# Patient Record
Sex: MALE | Race: WHITE | Employment: FULL TIME | URBAN - METROPOLITAN AREA
[De-identification: names, ages, dates, MRNs, and addresses within clinical notes are randomized per-mention and may not be internally consistent; named-entity substitution may affect disease eponyms.]

---

## 2017-01-20 ENCOUNTER — OFFICE VISIT (OUTPATIENT)
Dept: INTERNAL MEDICINE CLINIC | Facility: CLINIC | Age: 37
End: 2017-01-20

## 2017-01-20 VITALS
WEIGHT: 262 LBS | BODY MASS INDEX: 36.68 KG/M2 | HEIGHT: 71 IN | SYSTOLIC BLOOD PRESSURE: 125 MMHG | TEMPERATURE: 98 F | DIASTOLIC BLOOD PRESSURE: 88 MMHG | HEART RATE: 125 BPM

## 2017-01-20 DIAGNOSIS — J02.0 PHARYNGITIS DUE TO STREPTOCOCCUS SPECIES: Primary | ICD-10-CM

## 2017-01-20 LAB
CONTROL LINE PRESENT WITH A CLEAR BACKGROUND (YES/NO): YES YES/NO
KIT LOT #: ABNORMAL NUMERIC
STREP GRP A CUL-SCR: POSITIVE

## 2017-01-20 PROCEDURE — 87880 STREP A ASSAY W/OPTIC: CPT | Performed by: INTERNAL MEDICINE

## 2017-01-20 PROCEDURE — 99213 OFFICE O/P EST LOW 20 MIN: CPT | Performed by: INTERNAL MEDICINE

## 2017-01-20 RX ORDER — PENICILLIN V POTASSIUM 500 MG/1
500 TABLET ORAL 4 TIMES DAILY
Qty: 40 TABLET | Refills: 0 | Status: SHIPPED | OUTPATIENT
Start: 2017-01-20 | End: 2017-04-19

## 2017-01-20 NOTE — PROGRESS NOTES
Sore throat ear pain  Family members  With strep  Blood pressure 125/88, pulse 125, temperature 98.3 °F (36.8 °C), temperature source Oral, height 5' 11\" (1.803 m), weight 262 lb (118.842 kg).     HEENT-NC/AT PEERLA, eom intact, sclerae non icteric, oral e

## 2017-02-09 ENCOUNTER — TELEPHONE (OUTPATIENT)
Dept: INTERNAL MEDICINE CLINIC | Facility: CLINIC | Age: 37
End: 2017-02-09

## 2017-02-09 NOTE — TELEPHONE ENCOUNTER
Pt states for past  3-4 days having throat pain \"scratch in throat is persistent\", states had strep a few weeks ago. Requesting if possible to prescribe antibiotic to help clear out, Please Advise.

## 2017-02-09 NOTE — TELEPHONE ENCOUNTER
Pt asking if he can come to be swab for Strep at Lab,advised done at Office      appt made for Nurse visit  For tomorrow

## 2017-02-09 NOTE — TELEPHONE ENCOUNTER
Reason for Call/Chief Complaint: sore throat  Onset: 4 days  Nursing Assessment/Associated Symptoms: sore/scratchy throat, had strep recently, finished abx; denies fever, congestions, white spots on back.  __________________________________________________

## 2017-02-10 NOTE — TELEPHONE ENCOUNTER
Dr Deanna Santos I was misinformed about scheduling Pt with Strep test as a Nurse Visit at [address] [place] [address] Drive advised it has to be an OV since Dr's do the Test,tried to offer the 11:10 Appt    Pt was beyond upset and stated he just don't understand this Process and

## 2017-04-13 ENCOUNTER — TELEPHONE (OUTPATIENT)
Dept: INTERNAL MEDICINE CLINIC | Facility: CLINIC | Age: 37
End: 2017-04-13

## 2017-04-13 RX ORDER — TRIAMTERENE AND HYDROCHLOROTHIAZIDE 37.5; 25 MG/1; MG/1
1 CAPSULE ORAL EVERY MORNING
Qty: 30 CAPSULE | Refills: 3 | Status: SHIPPED | OUTPATIENT
Start: 2017-04-13 | End: 2017-05-15 | Stop reason: ALTCHOICE

## 2017-04-13 NOTE — TELEPHONE ENCOUNTER
Hypertensive Medications  Protocol Criteria:  · Appointment scheduled in the past 6 months or in the next 3 months  · BMP or CMP in the past 12 months  · Creatinine result < 2  Recent Visits       Provider Department Primary Dx    2 months ago Sylvester Bee

## 2017-04-15 RX ORDER — AMLODIPINE BESYLATE 5 MG/1
5 TABLET ORAL DAILY
Qty: 30 TABLET | Refills: 2 | Status: SHIPPED | OUTPATIENT
Start: 2017-04-15 | End: 2017-07-14

## 2017-04-19 ENCOUNTER — APPOINTMENT (OUTPATIENT)
Dept: LAB | Age: 37
End: 2017-04-19
Attending: FAMILY MEDICINE
Payer: COMMERCIAL

## 2017-04-19 ENCOUNTER — OFFICE VISIT (OUTPATIENT)
Dept: FAMILY MEDICINE CLINIC | Facility: CLINIC | Age: 37
End: 2017-04-19

## 2017-04-19 VITALS
TEMPERATURE: 98 F | WEIGHT: 255 LBS | SYSTOLIC BLOOD PRESSURE: 138 MMHG | RESPIRATION RATE: 18 BRPM | DIASTOLIC BLOOD PRESSURE: 93 MMHG | BODY MASS INDEX: 36 KG/M2 | HEART RATE: 109 BPM

## 2017-04-19 DIAGNOSIS — S30.861A TICK BITE OF ABDOMEN, INITIAL ENCOUNTER: ICD-10-CM

## 2017-04-19 DIAGNOSIS — R11.2 NON-INTRACTABLE VOMITING WITH NAUSEA, UNSPECIFIED VOMITING TYPE: Primary | ICD-10-CM
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
## 2017-04-19 PROCEDURE — 99212 OFFICE O/P EST SF 10 MIN: CPT | Performed by: FAMILY MEDICINE

## 2017-04-19 PROCEDURE — 99213 OFFICE O/P EST LOW 20 MIN: CPT | Performed by: FAMILY MEDICINE

## 2017-04-19 PROCEDURE — 86618 LYME DISEASE ANTIBODY: CPT

## 2017-04-19 PROCEDURE — 36415 COLL VENOUS BLD VENIPUNCTURE: CPT

## 2017-04-19 RX ORDER — AMLODIPINE BESYLATE 5 MG/1
5 TABLET ORAL DAILY
Qty: 30 TABLET | Refills: 3 | Status: CANCELLED | OUTPATIENT
Start: 2017-04-19

## 2017-04-19 NOTE — PROGRESS NOTES
HPI: Mak Chacon is a 43year old male who presents for low grade fever, cough, chills, and vomiting. Vomited several times overnight. Pt was playing golf on Sunday and found a tick on his abdomen. Removed alive and whole. Did not keep tick or take picture. etiology. Symptomatic management. Tick bite of abdomen, initial encounter    No rash or neurological symptoms noted. Will do Lyme titers today. Advised to call if he experiences neurological symptoms.     Chago Blanton, DO
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
## 2017-04-21 DIAGNOSIS — M79.10 MYALGIA: ICD-10-CM

## 2017-05-15 ENCOUNTER — OFFICE VISIT (OUTPATIENT)
Dept: INTERNAL MEDICINE CLINIC | Facility: CLINIC | Age: 37
End: 2017-05-15

## 2017-05-15 VITALS
SYSTOLIC BLOOD PRESSURE: 156 MMHG | HEART RATE: 112 BPM | BODY MASS INDEX: 35.56 KG/M2 | DIASTOLIC BLOOD PRESSURE: 92 MMHG | WEIGHT: 254 LBS | HEIGHT: 71 IN

## 2017-05-15 DIAGNOSIS — I10 ESSENTIAL HYPERTENSION: Primary | ICD-10-CM

## 2017-05-15 PROCEDURE — 99212 OFFICE O/P EST SF 10 MIN: CPT | Performed by: INTERNAL MEDICINE

## 2017-05-15 PROCEDURE — 99213 OFFICE O/P EST LOW 20 MIN: CPT | Performed by: INTERNAL MEDICINE

## 2017-05-15 RX ORDER — LISINOPRIL AND HYDROCHLOROTHIAZIDE 12.5; 1 MG/1; MG/1
1 TABLET ORAL DAILY
Qty: 30 TABLET | Refills: 3 | Status: SHIPPED | OUTPATIENT
Start: 2017-05-15 | End: 2017-09-06

## 2017-05-15 NOTE — PROGRESS NOTES
Prabha Aggarwal is a 43year old male. Patient presents with:  Hypertension    HPI:   Mr. Ted Watters presents this afternoon with concerns about elevated blood pressure. At his last visit with me in December, Dyazide was added to amlodipine.   Since then, Comment: Occasional beer         EXAM:   GENERAL: Pleasant male appearing well in no distress  /92 mmHg  Pulse 112  Ht 5' 11\" (1.803 m)  Wt 254 lb (115.214 kg)  BMI 35.44 kg/m2  LUNGS: Resonant to percussion and clear to auscultation  CARDIAC: Rhyth

## 2017-05-15 NOTE — PATIENT INSTRUCTIONS
Please stop Dyazide (triamterene/hydrochlorothiazide). Continue amlodipine. Begin lisinopril/HCTZ 10/12.5 mg 1 pill daily. Return visit in one month for a blood pressure check.

## 2017-07-11 RX ORDER — AMLODIPINE BESYLATE 5 MG/1
5 TABLET ORAL DAILY
Qty: 30 TABLET | Refills: 2 | Status: CANCELLED
Start: 2017-07-11

## 2017-07-13 RX ORDER — AMLODIPINE BESYLATE 5 MG/1
5 TABLET ORAL DAILY
Qty: 30 TABLET | Refills: 2 | Status: CANCELLED
Start: 2017-07-13

## 2017-07-14 ENCOUNTER — TELEPHONE (OUTPATIENT)
Dept: INTERNAL MEDICINE CLINIC | Facility: CLINIC | Age: 37
End: 2017-07-14

## 2017-07-14 RX ORDER — AMLODIPINE BESYLATE 5 MG/1
TABLET ORAL
Qty: 30 TABLET | Refills: 0 | Status: SHIPPED | OUTPATIENT
Start: 2017-07-14 | End: 2017-08-09

## 2017-07-15 NOTE — TELEPHONE ENCOUNTER
Hypertensive Medications  Protocol Criteria:  · Appointment scheduled in the past 6 months or in the next 3 months  · BMP or CMP in the past 12 months  · Creatinine result < 2  Recent Outpatient Visits            2 months ago Essential hypertension    [facility]

## 2017-07-17 NOTE — TELEPHONE ENCOUNTER
Pt contacted and states he would love to make an appt but it costs him $100 every time he comes here. Pt states he will do his best to make an appt in the near future.

## 2017-08-09 ENCOUNTER — TELEPHONE (OUTPATIENT)
Dept: INTERNAL MEDICINE CLINIC | Facility: CLINIC | Age: 37
End: 2017-08-09

## 2017-08-09 RX ORDER — AMLODIPINE BESYLATE 5 MG/1
TABLET ORAL
Qty: 30 TABLET | Refills: 0 | Status: SHIPPED | OUTPATIENT
Start: 2017-08-09 | End: 2017-09-06

## 2017-09-06 RX ORDER — AMLODIPINE BESYLATE 5 MG/1
TABLET ORAL
Qty: 30 TABLET | Refills: 0 | Status: SHIPPED | OUTPATIENT
Start: 2017-09-06 | End: 2017-09-11

## 2017-09-06 RX ORDER — LISINOPRIL AND HYDROCHLOROTHIAZIDE 12.5; 1 MG/1; MG/1
TABLET ORAL
Qty: 30 TABLET | Refills: 0 | Status: SHIPPED | OUTPATIENT
Start: 2017-09-06 | End: 2017-09-11

## 2017-09-11 ENCOUNTER — OFFICE VISIT (OUTPATIENT)
Dept: INTERNAL MEDICINE CLINIC | Facility: CLINIC | Age: 37
End: 2017-09-11

## 2017-09-11 VITALS
SYSTOLIC BLOOD PRESSURE: 128 MMHG | DIASTOLIC BLOOD PRESSURE: 72 MMHG | WEIGHT: 258 LBS | HEIGHT: 71 IN | HEART RATE: 111 BPM | BODY MASS INDEX: 36.12 KG/M2

## 2017-09-11 DIAGNOSIS — I10 ESSENTIAL HYPERTENSION: Primary | ICD-10-CM

## 2017-09-11 DIAGNOSIS — E78.5 DYSLIPIDEMIA: ICD-10-CM

## 2017-09-11 PROCEDURE — 99212 OFFICE O/P EST SF 10 MIN: CPT | Performed by: INTERNAL MEDICINE

## 2017-09-11 PROCEDURE — 99214 OFFICE O/P EST MOD 30 MIN: CPT | Performed by: INTERNAL MEDICINE

## 2017-09-11 RX ORDER — LISINOPRIL AND HYDROCHLOROTHIAZIDE 12.5; 1 MG/1; MG/1
1 TABLET ORAL
Qty: 90 TABLET | Refills: 1 | Status: SHIPPED | OUTPATIENT
Start: 2017-09-11 | End: 2018-03-06

## 2017-09-11 RX ORDER — AMLODIPINE BESYLATE 5 MG/1
5 TABLET ORAL
Qty: 90 TABLET | Refills: 1 | Status: SHIPPED | OUTPATIENT
Start: 2017-09-11 | End: 2018-05-31

## 2017-09-11 NOTE — PATIENT INSTRUCTIONS
Your blood pressure today was very well controlled. Please continue your current medications. Please obtain blood tests soon when you can. Please begin to exercise regularly. Return visit in 6 months.

## 2017-09-11 NOTE — H&P
Rafiq Navarro is a 43year old male who presents this afternoon for follow-up of hypertension and dyslipidemia. He is also due for his annual exam and labs. HPI:   At his last visit in May, lisinopril/HCTZ was substituted for Dyazide.   He feels well, Former Smoker                                                              Packs/day: 1.00      Years: 6.00         Types: Cigarettes     Quit date: 3/20/2005  Smokeless tobacco: Former User                     Alcohol use:  Yes              Comment: Claudene Libman

## 2017-10-13 ENCOUNTER — PATIENT MESSAGE (OUTPATIENT)
Dept: INTERNAL MEDICINE CLINIC | Facility: CLINIC | Age: 37
End: 2017-10-13

## 2018-01-11 ENCOUNTER — TELEPHONE (OUTPATIENT)
Dept: INTERNAL MEDICINE CLINIC | Facility: CLINIC | Age: 38
End: 2018-01-11

## 2018-01-11 NOTE — TELEPHONE ENCOUNTER
[place] called and informed with understanding. They stated they will make sure and inform the patient.

## 2018-01-11 NOTE — TELEPHONE ENCOUNTER
AmLODIPine Besylate 5 MG Oral Tab is in back order and would like to know can issue 10mg with half daily? Pls advise.

## 2018-03-06 RX ORDER — LISINOPRIL AND HYDROCHLOROTHIAZIDE 12.5; 1 MG/1; MG/1
TABLET ORAL
Qty: 25 TABLET | Refills: 0 | Status: SHIPPED | OUTPATIENT
Start: 2018-03-06 | End: 2018-03-29

## 2018-03-29 RX ORDER — LISINOPRIL AND HYDROCHLOROTHIAZIDE 12.5; 1 MG/1; MG/1
TABLET ORAL
Qty: 25 TABLET | Refills: 0 | Status: SHIPPED | OUTPATIENT
Start: 2018-03-29 | End: 2018-04-27

## 2018-04-27 RX ORDER — LISINOPRIL AND HYDROCHLOROTHIAZIDE 12.5; 1 MG/1; MG/1
TABLET ORAL
Qty: 25 TABLET | Refills: 0 | Status: SHIPPED | OUTPATIENT
Start: 2018-04-27 | End: 2018-05-31

## 2018-05-31 ENCOUNTER — OFFICE VISIT (OUTPATIENT)
Dept: FAMILY MEDICINE CLINIC | Facility: CLINIC | Age: 38
End: 2018-05-31

## 2018-05-31 VITALS
RESPIRATION RATE: 16 BRPM | WEIGHT: 269 LBS | OXYGEN SATURATION: 97 % | HEART RATE: 95 BPM | BODY MASS INDEX: 38 KG/M2 | DIASTOLIC BLOOD PRESSURE: 93 MMHG | SYSTOLIC BLOOD PRESSURE: 150 MMHG | TEMPERATURE: 99 F

## 2018-05-31 DIAGNOSIS — I10 ESSENTIAL HYPERTENSION: ICD-10-CM

## 2018-05-31 DIAGNOSIS — R05.9 COUGH: Primary | ICD-10-CM

## 2018-05-31 PROCEDURE — 99214 OFFICE O/P EST MOD 30 MIN: CPT | Performed by: FAMILY MEDICINE

## 2018-05-31 PROCEDURE — 99212 OFFICE O/P EST SF 10 MIN: CPT | Performed by: FAMILY MEDICINE

## 2018-05-31 RX ORDER — LISINOPRIL AND HYDROCHLOROTHIAZIDE 12.5; 1 MG/1; MG/1
1 TABLET ORAL
Qty: 90 TABLET | Refills: 1 | Status: SHIPPED | OUTPATIENT
Start: 2018-05-31 | End: 2018-12-19

## 2018-05-31 RX ORDER — AMLODIPINE BESYLATE 5 MG/1
5 TABLET ORAL
Qty: 90 TABLET | Refills: 1 | Status: SHIPPED | OUTPATIENT
Start: 2018-05-31 | End: 2018-09-19 | Stop reason: DRUGHIGH

## 2018-05-31 NOTE — PROGRESS NOTES
HPI: Christina Herr is a 42year old male who presents for follow-up. Pt is returning back to office after being seen by Dr. Leodan Crooks. Checks BP at home about once per month. Staes it is in the 140s/80-90s. Taking Lisinopril-HCTZ and Amlodipine.   Has not taking Lis aeration               No wheezes, rales or rhonchi      Assessment:/Plan:  Cough     Lungs clear to auscultation. Advised he continue to use Albuterol as needed. Call if no improvement. Essential hypertension    Meds refilled.  Encouraged pt to make ap

## 2018-06-20 ENCOUNTER — APPOINTMENT (OUTPATIENT)
Dept: LAB | Age: 38
End: 2018-06-20
Attending: FAMILY MEDICINE
Payer: COMMERCIAL

## 2018-06-20 ENCOUNTER — OFFICE VISIT (OUTPATIENT)
Dept: FAMILY MEDICINE CLINIC | Facility: CLINIC | Age: 38
End: 2018-06-20

## 2018-06-20 VITALS
BODY MASS INDEX: 36.84 KG/M2 | SYSTOLIC BLOOD PRESSURE: 153 MMHG | RESPIRATION RATE: 16 BRPM | HEIGHT: 71.5 IN | HEART RATE: 103 BPM | DIASTOLIC BLOOD PRESSURE: 89 MMHG | TEMPERATURE: 98 F | WEIGHT: 269 LBS

## 2018-06-20 DIAGNOSIS — I10 ESSENTIAL HYPERTENSION: ICD-10-CM

## 2018-06-20 DIAGNOSIS — M79.672 LEFT FOOT PAIN: ICD-10-CM

## 2018-06-20 DIAGNOSIS — Z00.00 ROUTINE PHYSICAL EXAMINATION: ICD-10-CM

## 2018-06-20 DIAGNOSIS — Z30.09 VASECTOMY EVALUATION: ICD-10-CM

## 2018-06-20 DIAGNOSIS — Z00.00 ROUTINE PHYSICAL EXAMINATION: Primary | ICD-10-CM

## 2018-06-20 PROCEDURE — 80061 LIPID PANEL: CPT

## 2018-06-20 PROCEDURE — 82306 VITAMIN D 25 HYDROXY: CPT

## 2018-06-20 PROCEDURE — 80053 COMPREHEN METABOLIC PANEL: CPT

## 2018-06-20 PROCEDURE — 84443 ASSAY THYROID STIM HORMONE: CPT

## 2018-06-20 PROCEDURE — 36415 COLL VENOUS BLD VENIPUNCTURE: CPT

## 2018-06-20 PROCEDURE — 85027 COMPLETE CBC AUTOMATED: CPT

## 2018-06-20 PROCEDURE — 81003 URINALYSIS AUTO W/O SCOPE: CPT

## 2018-06-20 PROCEDURE — 99395 PREV VISIT EST AGE 18-39: CPT | Performed by: FAMILY MEDICINE

## 2018-06-20 NOTE — PROGRESS NOTES
HPI:  42 yr old male who presents for physical. Taking medications. BP higher today. Taking both medications. Quit smoking 10 years ago. States he tries to eat healthier and decrease eating out. Eats at home frequently. Avoids fried foods.  Limited red m landmarks noted. Nares patent. Oral mucous membrane moist.  Normal lips, teeth, and gums. Oropharynx normal.  Neck supple. Good ROM. No LAD.   Thyroid normal.  CV:  Regular rate and rhythm; no murmurs  Lungs:  Clear to ausculation; good aeration

## 2018-06-27 DIAGNOSIS — E55.9 VITAMIN D DEFICIENCY: Primary | ICD-10-CM

## 2018-06-27 RX ORDER — ERGOCALCIFEROL 1.25 MG/1
50000 CAPSULE ORAL WEEKLY
Qty: 8 CAPSULE | Refills: 0 | Status: SHIPPED | OUTPATIENT
Start: 2018-06-27 | End: 2018-07-27

## 2018-07-16 RX ORDER — MOMETASONE FUROATE 1 MG/G
1 CREAM TOPICAL 2 TIMES DAILY PRN
Qty: 50 G | Refills: 1 | Status: SHIPPED
Start: 2018-07-16 | End: 2020-01-16

## 2018-07-16 NOTE — TELEPHONE ENCOUNTER
Pt is calling for status of his medication refill request. Per pt he is going out of town tomorrow and would like to  the script today. Pt can be reached at [phone].

## 2018-07-16 NOTE — TELEPHONE ENCOUNTER
please advise as does not meet RN protocol for refill criteria  LR 10/4/16      Refill Protocol Appointment Criteria  · Appointment scheduled in the past 6 months or in the next 3 months  Recent Outpatient Visits            3 weeks ago Routine physical ex

## 2018-09-19 ENCOUNTER — TELEPHONE (OUTPATIENT)
Dept: FAMILY MEDICINE CLINIC | Facility: CLINIC | Age: 38
End: 2018-09-19

## 2018-09-19 RX ORDER — AMLODIPINE BESYLATE 10 MG/1
10 TABLET ORAL DAILY
Qty: 30 TABLET | Refills: 1 | Status: SHIPPED | OUTPATIENT
Start: 2018-09-19 | End: 2018-11-13

## 2018-09-19 NOTE — TELEPHONE ENCOUNTER
Advised patient of Dr. Madeleine West note. Patient verbalized understanding  Patient encouraged to call office back if he continues to get high blood pressure readings after starting new medication.

## 2018-09-19 NOTE — TELEPHONE ENCOUNTER
Patient was advised to call in his BP readings.    This week his BP has been pretty high    9/17/2018 bp was 175/87  9/18/2018 No bp taken   9/19/2018 bp was 186/90

## 2018-10-11 ENCOUNTER — IMMUNIZATION (OUTPATIENT)
Dept: PEDIATRICS CLINIC | Facility: CLINIC | Age: 38
End: 2018-10-11

## 2018-10-11 DIAGNOSIS — Z23 NEED FOR VACCINATION: ICD-10-CM

## 2018-10-11 PROCEDURE — 90686 IIV4 VACC NO PRSV 0.5 ML IM: CPT | Performed by: PEDIATRICS

## 2018-10-11 PROCEDURE — 90471 IMMUNIZATION ADMIN: CPT | Performed by: PEDIATRICS

## 2018-11-13 RX ORDER — AMLODIPINE BESYLATE 10 MG/1
TABLET ORAL
Qty: 30 TABLET | Refills: 2 | Status: SHIPPED | OUTPATIENT
Start: 2018-11-13 | End: 2019-04-08

## 2018-12-21 NOTE — TELEPHONE ENCOUNTER
Please review; protocol failed.     Hypertensive Medications  Protocol Criteria:  · Appointment scheduled in the past 6 months or in the next 3 months  · BMP or CMP in the past 12 months  · Creatinine result < 2  Recent Outpatient Visits            6 months

## 2018-12-22 RX ORDER — LISINOPRIL AND HYDROCHLOROTHIAZIDE 12.5; 1 MG/1; MG/1
1 TABLET ORAL
Qty: 90 TABLET | Refills: 0 | Status: SHIPPED | OUTPATIENT
Start: 2018-12-22 | End: 2019-04-08

## 2019-04-08 ENCOUNTER — OFFICE VISIT (OUTPATIENT)
Dept: FAMILY MEDICINE CLINIC | Facility: CLINIC | Age: 39
End: 2019-04-08
Payer: COMMERCIAL

## 2019-04-08 VITALS
SYSTOLIC BLOOD PRESSURE: 141 MMHG | DIASTOLIC BLOOD PRESSURE: 89 MMHG | RESPIRATION RATE: 16 BRPM | HEART RATE: 108 BPM | TEMPERATURE: 97 F | BODY MASS INDEX: 37 KG/M2 | WEIGHT: 271 LBS

## 2019-04-08 DIAGNOSIS — I10 ESSENTIAL HYPERTENSION: Primary | ICD-10-CM

## 2019-04-08 PROCEDURE — 99214 OFFICE O/P EST MOD 30 MIN: CPT | Performed by: FAMILY MEDICINE

## 2019-04-08 PROCEDURE — 99212 OFFICE O/P EST SF 10 MIN: CPT | Performed by: FAMILY MEDICINE

## 2019-04-08 RX ORDER — AMLODIPINE BESYLATE 10 MG/1
10 TABLET ORAL
Qty: 90 TABLET | Refills: 1 | Status: SHIPPED | OUTPATIENT
Start: 2019-04-08 | End: 2019-09-30

## 2019-04-08 RX ORDER — LISINOPRIL AND HYDROCHLOROTHIAZIDE 12.5; 1 MG/1; MG/1
1 TABLET ORAL
Qty: 90 TABLET | Refills: 1 | Status: SHIPPED | OUTPATIENT
Start: 2019-04-08 | End: 2019-09-30

## 2019-04-08 NOTE — PROGRESS NOTES
HPI: Donna Chang is a 34year old male who presents for HTN follow-up. States he feels well. He is frustrated with weight. He has not had time to get to gym due to work travel and children. Decreased carbs for the past 3-4 weeks. Lost a few pounds.   Denies ch

## 2019-05-18 ENCOUNTER — OFFICE VISIT (OUTPATIENT)
Dept: FAMILY MEDICINE CLINIC | Facility: CLINIC | Age: 39
End: 2019-05-18
Payer: COMMERCIAL

## 2019-05-18 VITALS
OXYGEN SATURATION: 98 % | HEART RATE: 80 BPM | SYSTOLIC BLOOD PRESSURE: 126 MMHG | BODY MASS INDEX: 32.62 KG/M2 | WEIGHT: 233 LBS | HEIGHT: 71 IN | RESPIRATION RATE: 16 BRPM | DIASTOLIC BLOOD PRESSURE: 80 MMHG | TEMPERATURE: 98 F

## 2019-05-18 DIAGNOSIS — J02.9 PHARYNGITIS, UNSPECIFIED ETIOLOGY: Primary | ICD-10-CM

## 2019-05-18 DIAGNOSIS — H69.82 DYSFUNCTION OF LEFT EUSTACHIAN TUBE: ICD-10-CM

## 2019-05-18 PROCEDURE — 99202 OFFICE O/P NEW SF 15 MIN: CPT | Performed by: NURSE PRACTITIONER

## 2019-05-18 PROCEDURE — 87880 STREP A ASSAY W/OPTIC: CPT | Performed by: NURSE PRACTITIONER

## 2019-05-18 NOTE — PROGRESS NOTES
CHIEF COMPLAINT:   Patient presents with:  Sore Throat: for 3 days, daughter currently has strep  Ear Itch: since this morning. with popping       HPI:   Eric Reynoso is a 34year old male presents to clinic with complaint of sore throat.  Patient has ha NEURO: denies dizziness or lightheadedness    EXAM:   /80   Pulse 80   Temp 98.4 °F (36.9 °C) (Oral)   Resp 16   Ht 71\"   Wt 233 lb   SpO2 98%   BMI 32.50 kg/m²   GENERAL: well developed, well nourished, in no apparent distress  SKIN: no rashes, no - Advised follow up in 3-5 days if not improving, condition worsens, or new/persistent fevers. - Pt verbalizes understanding and is agreeable w/ plan.     Patient Instructions       Self-Care for Sore Throats    Sore throats happen for many reasons, such a · When you’re around someone with a sore throat or cold, wash your hands often to keep viruses or bacteria from spreading. · Don’t strain your vocal cords.   Call your healthcare provider  Contact your healthcare provider if you have:  · A temperature over

## 2019-05-19 PROBLEM — H69.92 DYSFUNCTION OF LEFT EUSTACHIAN TUBE: Status: ACTIVE | Noted: 2019-05-19

## 2019-05-19 PROBLEM — H69.82 DYSFUNCTION OF LEFT EUSTACHIAN TUBE: Status: ACTIVE | Noted: 2019-05-19

## 2019-08-28 ENCOUNTER — HOSPITAL ENCOUNTER (OUTPATIENT)
Age: 39
Discharge: HOME | End: 2019-08-28
Attending: EMERGENCY MEDICINE
Payer: COMMERCIAL

## 2019-08-28 VITALS
DIASTOLIC BLOOD PRESSURE: 90 MMHG | HEIGHT: 71 IN | SYSTOLIC BLOOD PRESSURE: 135 MMHG | OXYGEN SATURATION: 100 % | RESPIRATION RATE: 21 BRPM | BODY MASS INDEX: 37.1 KG/M2 | TEMPERATURE: 98 F | WEIGHT: 265 LBS | HEART RATE: 90 BPM

## 2019-08-28 DIAGNOSIS — J30.2 SEASONAL ALLERGIES: ICD-10-CM

## 2019-08-28 DIAGNOSIS — J01.90 ACUTE VIRAL SINUSITIS: Primary | ICD-10-CM

## 2019-08-28 DIAGNOSIS — B97.89 ACUTE VIRAL SINUSITIS: Primary | ICD-10-CM

## 2019-08-28 PROCEDURE — 99214 OFFICE O/P EST MOD 30 MIN: CPT

## 2019-08-28 PROCEDURE — 99213 OFFICE O/P EST LOW 20 MIN: CPT

## 2019-08-28 RX ORDER — FLUTICASONE PROPIONATE 50 MCG
1-2 SPRAY, SUSPENSION (ML) NASAL DAILY
Qty: 16 G | Refills: 0 | Status: SHIPPED | OUTPATIENT
Start: 2019-08-28 | End: 2019-09-27

## 2019-08-28 RX ORDER — METHYLPREDNISOLONE 4 MG/1
TABLET ORAL
Qty: 1 PACKAGE | Refills: 0 | Status: SHIPPED | OUTPATIENT
Start: 2019-08-28 | End: 2020-01-16

## 2019-08-28 NOTE — ED PROVIDER NOTES
Patient Seen in: [facility]    History   Patient presents with:  Sinus Problem    Stated Complaint: pressure and headache, sinus condition    HPI  Patient complains of for 5 days of runny nose, postnasal drip and rare co complaint: pressure and headache, sinus condition  Other systems are as noted in HPI. Constitutional and vital signs reviewed. All other systems reviewed and negative except as noted above.     Physical Exam     ED Triage Vitals [08/28/19 0814]   BP 1 affect. His behavior is normal.   Nursing note and vitals reviewed. ED Course   Labs Reviewed - No data to display       MDM   Patient has symptoms more consistent with a viral sinusitis and quite possibly some exacerbation of her seasonal allergies.

## 2019-09-30 RX ORDER — AMLODIPINE BESYLATE 10 MG/1
10 TABLET ORAL
Qty: 90 TABLET | Refills: 1 | Status: SHIPPED | OUTPATIENT
Start: 2019-09-30 | End: 2020-01-16

## 2019-09-30 RX ORDER — LISINOPRIL AND HYDROCHLOROTHIAZIDE 12.5; 1 MG/1; MG/1
1 TABLET ORAL
Qty: 90 TABLET | Refills: 1 | Status: SHIPPED | OUTPATIENT
Start: 2019-09-30 | End: 2020-01-16

## 2019-09-30 NOTE — TELEPHONE ENCOUNTER
Protocol failing due to lab. Noted pt has pending lab orders including CMP from 4/2019. LMTCB; want to remind to complete labs before routing request to Dr. Mary Anglin. [employer] message also sent.

## 2019-09-30 NOTE — TELEPHONE ENCOUNTER
The patient called back and informed with understanding. Instructed to fast for at least 12 hours prior to the test but may have as much water as needed with understanding. Dr. Aminata Barrera he stated he will have done next week.

## 2020-01-16 ENCOUNTER — OFFICE VISIT (OUTPATIENT)
Dept: FAMILY MEDICINE CLINIC | Facility: CLINIC | Age: 40
End: 2020-01-16
Payer: COMMERCIAL

## 2020-01-16 ENCOUNTER — APPOINTMENT (OUTPATIENT)
Dept: LAB | Age: 40
End: 2020-01-16
Attending: FAMILY MEDICINE
Payer: COMMERCIAL

## 2020-01-16 VITALS
SYSTOLIC BLOOD PRESSURE: 145 MMHG | RESPIRATION RATE: 16 BRPM | BODY MASS INDEX: 37.11 KG/M2 | HEART RATE: 108 BPM | WEIGHT: 271 LBS | TEMPERATURE: 98 F | DIASTOLIC BLOOD PRESSURE: 94 MMHG | HEIGHT: 71.5 IN

## 2020-01-16 DIAGNOSIS — I10 ESSENTIAL HYPERTENSION: ICD-10-CM

## 2020-01-16 DIAGNOSIS — L98.9 SKIN LESION OF NECK: ICD-10-CM

## 2020-01-16 DIAGNOSIS — Z91.013 SHELLFISH ALLERGY: ICD-10-CM

## 2020-01-16 DIAGNOSIS — Z30.09 VASECTOMY EVALUATION: ICD-10-CM

## 2020-01-16 DIAGNOSIS — Z00.00 ROUTINE PHYSICAL EXAMINATION: ICD-10-CM

## 2020-01-16 DIAGNOSIS — L30.9 ECZEMA, UNSPECIFIED TYPE: ICD-10-CM

## 2020-01-16 DIAGNOSIS — Z00.00 ROUTINE PHYSICAL EXAMINATION: Primary | ICD-10-CM

## 2020-01-16 DIAGNOSIS — J45.20 MILD INTERMITTENT ASTHMA WITHOUT COMPLICATION: ICD-10-CM

## 2020-01-16 LAB
ALBUMIN SERPL-MCNC: 4.1 G/DL (ref 3.4–5)
ALBUMIN/GLOB SERPL: 1.2 {RATIO} (ref 1–2)
ALP LIVER SERPL-CCNC: 78 U/L (ref 45–117)
ALT SERPL-CCNC: 75 U/L (ref 16–61)
ANION GAP SERPL CALC-SCNC: 4 MMOL/L (ref 0–18)
AST SERPL-CCNC: 23 U/L (ref 15–37)
BILIRUB SERPL-MCNC: 0.6 MG/DL (ref 0.1–2)
BUN BLD-MCNC: 11 MG/DL (ref 7–18)
BUN/CREAT SERPL: 11.2 (ref 10–20)
CALCIUM BLD-MCNC: 9.3 MG/DL (ref 8.5–10.1)
CHLORIDE SERPL-SCNC: 107 MMOL/L (ref 98–112)
CHOLEST SMN-MCNC: 196 MG/DL (ref ?–200)
CO2 SERPL-SCNC: 27 MMOL/L (ref 21–32)
CREAT BLD-MCNC: 0.98 MG/DL (ref 0.7–1.3)
DEPRECATED RDW RBC AUTO: 37.4 FL (ref 35.1–46.3)
ERYTHROCYTE [DISTWIDTH] IN BLOOD BY AUTOMATED COUNT: 12.1 % (ref 11–15)
EST. AVERAGE GLUCOSE BLD GHB EST-MCNC: 100 MG/DL (ref 68–126)
GLOBULIN PLAS-MCNC: 3.5 G/DL (ref 2.8–4.4)
GLUCOSE BLD-MCNC: 92 MG/DL (ref 70–99)
HBA1C MFR BLD HPLC: 5.1 % (ref ?–5.7)
HCT VFR BLD AUTO: 47.1 % (ref 39–53)
HDLC SERPL-MCNC: 36 MG/DL (ref 40–59)
HGB BLD-MCNC: 16.4 G/DL (ref 13–17.5)
LDLC SERPL CALC-MCNC: 114 MG/DL (ref ?–100)
M PROTEIN MFR SERPL ELPH: 7.6 G/DL (ref 6.4–8.2)
MCH RBC QN AUTO: 29.7 PG (ref 26–34)
MCHC RBC AUTO-ENTMCNC: 34.8 G/DL (ref 31–37)
MCV RBC AUTO: 85.2 FL (ref 80–100)
NONHDLC SERPL-MCNC: 160 MG/DL (ref ?–130)
OSMOLALITY SERPL CALC.SUM OF ELEC: 285 MOSM/KG (ref 275–295)
PATIENT FASTING Y/N/NP: YES
PATIENT FASTING Y/N/NP: YES
PLATELET # BLD AUTO: 362 10(3)UL (ref 150–450)
POTASSIUM SERPL-SCNC: 4.2 MMOL/L (ref 3.5–5.1)
RBC # BLD AUTO: 5.53 X10(6)UL (ref 4.3–5.7)
SODIUM SERPL-SCNC: 138 MMOL/L (ref 136–145)
TRIGL SERPL-MCNC: 229 MG/DL (ref 30–149)
TSI SER-ACNC: 0.6 MIU/ML (ref 0.36–3.74)
VLDLC SERPL CALC-MCNC: 46 MG/DL (ref 0–30)
WBC # BLD AUTO: 8.3 X10(3) UL (ref 4–11)

## 2020-01-16 PROCEDURE — 82306 VITAMIN D 25 HYDROXY: CPT

## 2020-01-16 PROCEDURE — 80061 LIPID PANEL: CPT

## 2020-01-16 PROCEDURE — 84443 ASSAY THYROID STIM HORMONE: CPT

## 2020-01-16 PROCEDURE — 83036 HEMOGLOBIN GLYCOSYLATED A1C: CPT

## 2020-01-16 PROCEDURE — 90471 IMMUNIZATION ADMIN: CPT | Performed by: FAMILY MEDICINE

## 2020-01-16 PROCEDURE — 85027 COMPLETE CBC AUTOMATED: CPT

## 2020-01-16 PROCEDURE — 36415 COLL VENOUS BLD VENIPUNCTURE: CPT

## 2020-01-16 PROCEDURE — 99396 PREV VISIT EST AGE 40-64: CPT | Performed by: FAMILY MEDICINE

## 2020-01-16 PROCEDURE — 93000 ELECTROCARDIOGRAM COMPLETE: CPT | Performed by: FAMILY MEDICINE

## 2020-01-16 PROCEDURE — 90686 IIV4 VACC NO PRSV 0.5 ML IM: CPT | Performed by: FAMILY MEDICINE

## 2020-01-16 PROCEDURE — 80053 COMPREHEN METABOLIC PANEL: CPT

## 2020-01-16 RX ORDER — LISINOPRIL AND HYDROCHLOROTHIAZIDE 12.5; 1 MG/1; MG/1
1 TABLET ORAL
Qty: 90 TABLET | Refills: 1 | Status: SHIPPED | OUTPATIENT
Start: 2020-01-16 | End: 2020-04-10

## 2020-01-16 RX ORDER — MOMETASONE FUROATE 1 MG/G
1 CREAM TOPICAL 2 TIMES DAILY PRN
Qty: 50 G | Refills: 1 | Status: SHIPPED | OUTPATIENT
Start: 2020-01-16 | End: 2021-02-08

## 2020-01-16 RX ORDER — AMLODIPINE BESYLATE 10 MG/1
10 TABLET ORAL
Qty: 90 TABLET | Refills: 1 | Status: SHIPPED | OUTPATIENT
Start: 2020-01-16 | End: 2020-04-10

## 2020-01-16 RX ORDER — ALBUTEROL SULFATE 90 UG/1
1 AEROSOL, METERED RESPIRATORY (INHALATION) AS NEEDED
Qty: 1 INHALER | Refills: 2 | Status: SHIPPED | OUTPATIENT
Start: 2020-01-16 | End: 2020-03-12

## 2020-01-16 NOTE — PROGRESS NOTES
HPI:  37 yr old male who presents for physical. Started new job at the end of October.  with 2 kids. Has gained some weight. Plans on going back to gym. Trying to eat better. Snores regularly. BP high today.   States it has been lower over the noted.  Nares patent. Oral mucous membrane moist.  Normal lips, teeth, and gums. Oropharynx normal.  Neck supple. Good ROM. No LAD.   Thyroid normal.  CV:  Regular rate and rhythm; no murmurs  Lungs:  Clear to ausculation; good aeration               No

## 2020-01-17 ENCOUNTER — TELEPHONE (OUTPATIENT)
Dept: FAMILY MEDICINE CLINIC | Facility: CLINIC | Age: 40
End: 2020-01-17

## 2020-01-17 LAB — 25(OH)D3 SERPL-MCNC: 9.4 NG/ML (ref 30–100)

## 2020-01-17 NOTE — TELEPHONE ENCOUNTER
Pharmacy called in stating that per insurance they need to know how many puffs per day. They are looking to clarify directions. Please advise.        Current Outpatient Medications:   •  Albuterol Sulfate HFA (PROAIR HFA) 108 (90 Base) MCG/ACT Inhalatio

## 2020-01-17 NOTE — TELEPHONE ENCOUNTER
Patient of Dr. Silva Presume: Please see pharmacy message below. Can you please clarify number of puffs allowed per day for Proair. Thank you.

## 2020-01-18 DIAGNOSIS — E55.9 VITAMIN D DEFICIENCY: Primary | ICD-10-CM

## 2020-01-18 RX ORDER — ERGOCALCIFEROL 1.25 MG/1
50000 CAPSULE ORAL WEEKLY
Qty: 8 CAPSULE | Refills: 0 | Status: SHIPPED | OUTPATIENT
Start: 2020-01-18 | End: 2020-02-17

## 2020-03-03 ENCOUNTER — HOSPITAL ENCOUNTER (OUTPATIENT)
Age: 40
Discharge: HOME | End: 2020-03-03
Attending: EMERGENCY MEDICINE
Payer: COMMERCIAL

## 2020-03-03 ENCOUNTER — PATIENT MESSAGE (OUTPATIENT)
Dept: FAMILY MEDICINE CLINIC | Facility: CLINIC | Age: 40
End: 2020-03-03

## 2020-03-03 VITALS
HEIGHT: 71.5 IN | HEART RATE: 103 BPM | WEIGHT: 267 LBS | OXYGEN SATURATION: 99 % | RESPIRATION RATE: 17 BRPM | SYSTOLIC BLOOD PRESSURE: 153 MMHG | BODY MASS INDEX: 36.56 KG/M2 | TEMPERATURE: 99 F | DIASTOLIC BLOOD PRESSURE: 76 MMHG

## 2020-03-03 DIAGNOSIS — L30.9 ECZEMA, UNSPECIFIED TYPE: ICD-10-CM

## 2020-03-03 DIAGNOSIS — L98.9 SKIN LESION OF NECK: Primary | ICD-10-CM

## 2020-03-03 DIAGNOSIS — R06.00 DYSPNEA, UNSPECIFIED TYPE: Primary | ICD-10-CM

## 2020-03-03 PROCEDURE — 93010 ELECTROCARDIOGRAM REPORT: CPT | Performed by: EMERGENCY MEDICINE

## 2020-03-03 PROCEDURE — 99214 OFFICE O/P EST MOD 30 MIN: CPT

## 2020-03-03 PROCEDURE — 93010 ELECTROCARDIOGRAM REPORT: CPT

## 2020-03-03 PROCEDURE — 93005 ELECTROCARDIOGRAM TRACING: CPT

## 2020-03-04 ENCOUNTER — HOSPITAL ENCOUNTER (EMERGENCY)
Facility: HOSPITAL | Age: 40
Discharge: HOME | End: 2020-03-04
Payer: COMMERCIAL

## 2020-03-04 ENCOUNTER — APPOINTMENT (OUTPATIENT)
Dept: GENERAL RADIOLOGY | Facility: HOSPITAL | Age: 40
End: 2020-03-04
Payer: COMMERCIAL

## 2020-03-04 VITALS
TEMPERATURE: 99 F | BODY MASS INDEX: 37.38 KG/M2 | HEIGHT: 71 IN | HEART RATE: 90 BPM | RESPIRATION RATE: 20 BRPM | DIASTOLIC BLOOD PRESSURE: 86 MMHG | OXYGEN SATURATION: 96 % | SYSTOLIC BLOOD PRESSURE: 122 MMHG | WEIGHT: 267 LBS

## 2020-03-04 DIAGNOSIS — J45.909 ACUTE ASTHMA: Primary | ICD-10-CM

## 2020-03-04 DIAGNOSIS — J11.1 INFLUENZA: ICD-10-CM

## 2020-03-04 LAB
ANION GAP SERPL CALC-SCNC: 8 MMOL/L (ref 0–18)
BUN BLD-MCNC: 13 MG/DL (ref 7–18)
BUN/CREAT SERPL: 13.3 (ref 10–20)
CALCIUM BLD-MCNC: 9.5 MG/DL (ref 8.5–10.1)
CHLORIDE SERPL-SCNC: 106 MMOL/L (ref 98–112)
CO2 SERPL-SCNC: 24 MMOL/L (ref 21–32)
CREAT BLD-MCNC: 0.98 MG/DL (ref 0.7–1.3)
D DIMER PPP FEU-MCNC: <0.27 UG/ML FEU (ref ?–0.5)
FLUAV + FLUBV RNA SPEC NAA+PROBE: NEGATIVE
FLUAV + FLUBV RNA SPEC NAA+PROBE: NEGATIVE
FLUAV + FLUBV RNA SPEC NAA+PROBE: POSITIVE
GLUCOSE BLD-MCNC: 86 MG/DL (ref 70–99)
OSMOLALITY SERPL CALC.SUM OF ELEC: 285 MOSM/KG (ref 275–295)
POTASSIUM SERPL-SCNC: 3.8 MMOL/L (ref 3.5–5.1)
SODIUM SERPL-SCNC: 138 MMOL/L (ref 136–145)

## 2020-03-04 PROCEDURE — 94640 AIRWAY INHALATION TREATMENT: CPT

## 2020-03-04 PROCEDURE — 71046 X-RAY EXAM CHEST 2 VIEWS: CPT

## 2020-03-04 PROCEDURE — 93005 ELECTROCARDIOGRAM TRACING: CPT

## 2020-03-04 PROCEDURE — 93010 ELECTROCARDIOGRAM REPORT: CPT | Performed by: INTERNAL MEDICINE

## 2020-03-04 PROCEDURE — 96361 HYDRATE IV INFUSION ADD-ON: CPT

## 2020-03-04 PROCEDURE — 96374 THER/PROPH/DIAG INJ IV PUSH: CPT

## 2020-03-04 PROCEDURE — 85379 FIBRIN DEGRADATION QUANT: CPT | Performed by: EMERGENCY MEDICINE

## 2020-03-04 PROCEDURE — 99285 EMERGENCY DEPT VISIT HI MDM: CPT

## 2020-03-04 PROCEDURE — 87631 RESP VIRUS 3-5 TARGETS: CPT | Performed by: EMERGENCY MEDICINE

## 2020-03-04 PROCEDURE — 80048 BASIC METABOLIC PNL TOTAL CA: CPT | Performed by: EMERGENCY MEDICINE

## 2020-03-04 RX ORDER — OSELTAMIVIR PHOSPHATE 75 MG/1
75 CAPSULE ORAL 2 TIMES DAILY
Qty: 9 CAPSULE | Refills: 0 | Status: SHIPPED | OUTPATIENT
Start: 2020-03-04 | End: 2020-03-09

## 2020-03-04 RX ORDER — IPRATROPIUM BROMIDE AND ALBUTEROL SULFATE 2.5; .5 MG/3ML; MG/3ML
3 SOLUTION RESPIRATORY (INHALATION) ONCE
Status: COMPLETED | OUTPATIENT
Start: 2020-03-04 | End: 2020-03-04

## 2020-03-04 RX ORDER — OSELTAMIVIR PHOSPHATE 75 MG/1
75 CAPSULE ORAL ONCE
Status: COMPLETED | OUTPATIENT
Start: 2020-03-04 | End: 2020-03-04

## 2020-03-04 RX ORDER — METHYLPREDNISOLONE SODIUM SUCCINATE 40 MG/ML
40 INJECTION, POWDER, LYOPHILIZED, FOR SOLUTION INTRAMUSCULAR; INTRAVENOUS ONCE
Status: COMPLETED | OUTPATIENT
Start: 2020-03-04 | End: 2020-03-04

## 2020-03-04 RX ORDER — ALBUTEROL SULFATE 2.5 MG/3ML
2.5 SOLUTION RESPIRATORY (INHALATION) EVERY 4 HOURS PRN
Qty: 30 AMPULE | Refills: 0 | Status: SHIPPED | OUTPATIENT
Start: 2020-03-04 | End: 2020-03-12

## 2020-03-04 RX ORDER — PREDNISONE 20 MG/1
40 TABLET ORAL DAILY
Qty: 8 TABLET | Refills: 0 | Status: SHIPPED | OUTPATIENT
Start: 2020-03-04 | End: 2020-03-08

## 2020-03-04 RX ORDER — ALBUTEROL SULFATE 90 UG/1
1 AEROSOL, METERED RESPIRATORY (INHALATION) EVERY 4 HOURS PRN
Qty: 1 INHALER | Refills: 0 | Status: SHIPPED | OUTPATIENT
Start: 2020-03-04 | End: 2020-03-12

## 2020-03-04 NOTE — ED INITIAL ASSESSMENT (HPI)
Pt presents to the IC with c/o SOB. Pt reports being sick last with a viral illness that improved on it's own. Pt notes he was having difficulty breathing while talking on the phone tonight. No chest pain. No SOB upon exacerbation today.

## 2020-03-04 NOTE — TELEPHONE ENCOUNTER
From: Dane Oconnor  To: Eusebio Arshad DO  Sent: 3/3/2020 3:12 PM CST  Subject: Referral Request    Dr. Yenni Marie,    I hope hamzah mejia are good with you. Can you please refer me to a different dermatologist thank Fei Schwartz?  I called his office and they d

## 2020-03-04 NOTE — ED INITIAL ASSESSMENT (HPI)
Pt states cough and hard for him to take deep breaths. Also c/o sternal CP. States he was seen at an UC yesterday and sent home and told if e didn't gt any better to com to ED. Low grade fevers.

## 2020-03-04 NOTE — TELEPHONE ENCOUNTER
Dr Yenni Whitten please review pended referral for dermatology per patient request below, thank you.

## 2020-03-04 NOTE — ED NOTES
Pt verbalized understanding to go directly to the ED if his SOB gets worse, he has CP, dizziness or sudden onset of pain.

## 2020-03-04 NOTE — ED PROVIDER NOTES
Patient Seen in: [address]      History   Patient presents with:  Dyspnea ERIC SOB    Stated Complaint: SOB    HPI    Patient is a 25-year-old male with a history of asthma and hypertension.   He was on a phone with his f 17   Temp 98.7 °F (37.1 °C)   Temp src Oral   SpO2 99 %   O2 Device None (Room air)       Current:/76   Pulse 103   Temp 98.7 °F (37.1 °C) (Oral)   Resp 17   Ht 181.6 cm (5' 11.5\")   Wt 121.1 kg   SpO2 99%   BMI 36.72 kg/m²         Physical Exam worsen. MDM     I did encourage him to see his doctor in the next day or 2 if he does not go to the ER.               Disposition and Plan     Clinical Impression:  Dyspnea, unspecified type  (primary encounter diagnosis)    Disposition:  Discharge

## 2020-03-05 NOTE — ED PROVIDER NOTES
Patient Seen in: [facility] AND [facility] Emergency Department    History   Patient presents with:  Cough/URI    Stated Complaint: cough X 1 day, no fever     HPI    22-year-old male with history of hypertension, asthma, dyslipidemia presenting for evaluation of Yes      Frequency: 2-3 times a week      Comment: Occasional beer    Drug use: Yes      Types: Cannabis      Review of Systems :  Constitutional: As per HPI  Respiratory: (+) cough/dyspnea. Cardiovascular: Negative for chest pain and palpitations.      Po INDICATIONS: Cough, fever and midsternal chest pain for one day  TECHNIQUE:   Two views. FINDINGS: CARDIAC/VASC: No cardiac silhouette abnormality or cardiomegaly. Unremarkable pulmonary vasculature. MEDIAST/DAVIDSON: No visible mass or adenopathy.  LUNGS/P capsule, R-0    predniSONE 20 MG Oral Tab  Take 2 tablets (40 mg total) by mouth daily for 4 days. , Print, Disp-8 tablet, R-0    albuterol sulfate (2.5 MG/3ML) 0.083% Inhalation Nebu Soln  Take 3 mL (2.5 mg total) by nebulization every 4 (four) hours as ne

## 2020-03-07 ENCOUNTER — OFFICE VISIT (OUTPATIENT)
Dept: FAMILY MEDICINE CLINIC | Facility: CLINIC | Age: 40
End: 2020-03-07
Payer: COMMERCIAL

## 2020-03-07 VITALS
TEMPERATURE: 98 F | SYSTOLIC BLOOD PRESSURE: 121 MMHG | BODY MASS INDEX: 37 KG/M2 | DIASTOLIC BLOOD PRESSURE: 76 MMHG | RESPIRATION RATE: 16 BRPM | WEIGHT: 263 LBS | OXYGEN SATURATION: 96 % | HEART RATE: 82 BPM

## 2020-03-07 DIAGNOSIS — F41.9 ANXIETY: ICD-10-CM

## 2020-03-07 DIAGNOSIS — J10.1 INFLUENZA A: Primary | ICD-10-CM

## 2020-03-07 PROCEDURE — 99214 OFFICE O/P EST MOD 30 MIN: CPT | Performed by: FAMILY MEDICINE

## 2020-03-07 NOTE — PROGRESS NOTES
HPI: Jamison Mcardle is a 37year old male who presents for ER follow-up. Started with stomach flu 3 weeks ago which resolved. On Tuesday, he developed ache in chest and hard time breathing. Had asthma as a child. Went to . Checked on fine. Went home.   The nex hours as needed for Wheezing., Disp: 1 Inhaler, Rfl: 0  Mometasone Furoate (ELOCON) 0.1 % External Cream, Apply 1 Application topically 2 (two) times daily as needed. , Disp: 50 g, Rfl: 1  Chlorphen-Pseudoephed-APAP (CORICIDIN D OR), Take by mouth., Disp: ,

## 2020-03-12 ENCOUNTER — HOSPITAL ENCOUNTER (OUTPATIENT)
Age: 40
Discharge: HOME | End: 2020-03-12
Attending: EMERGENCY MEDICINE
Payer: COMMERCIAL

## 2020-03-12 ENCOUNTER — TELEPHONE (OUTPATIENT)
Dept: OTHER | Age: 40
End: 2020-03-12

## 2020-03-12 ENCOUNTER — PATIENT MESSAGE (OUTPATIENT)
Dept: FAMILY MEDICINE CLINIC | Facility: CLINIC | Age: 40
End: 2020-03-12

## 2020-03-12 VITALS
BODY MASS INDEX: 36.68 KG/M2 | HEART RATE: 98 BPM | DIASTOLIC BLOOD PRESSURE: 97 MMHG | WEIGHT: 262 LBS | SYSTOLIC BLOOD PRESSURE: 140 MMHG | TEMPERATURE: 99 F | RESPIRATION RATE: 20 BRPM | OXYGEN SATURATION: 97 % | HEIGHT: 71 IN

## 2020-03-12 DIAGNOSIS — J10.1 INFLUENZA A: Primary | ICD-10-CM

## 2020-03-12 PROCEDURE — 99214 OFFICE O/P EST MOD 30 MIN: CPT

## 2020-03-12 PROCEDURE — 99213 OFFICE O/P EST LOW 20 MIN: CPT

## 2020-03-12 RX ORDER — BENZONATATE 100 MG/1
100 CAPSULE ORAL 3 TIMES DAILY PRN
Qty: 21 CAPSULE | Refills: 0 | Status: SHIPPED | OUTPATIENT
Start: 2020-03-12 | End: 2021-02-08

## 2020-03-12 RX ORDER — ALBUTEROL SULFATE 2.5 MG/3ML
2.5 SOLUTION RESPIRATORY (INHALATION) EVERY 4 HOURS PRN
Qty: 30 AMPULE | Refills: 0 | Status: SHIPPED | OUTPATIENT
Start: 2020-03-12 | End: 2020-04-11

## 2020-03-12 NOTE — TELEPHONE ENCOUNTER
Patient stated he just got back from the urgent care center and given medication for his nebulizer and prescribed Tessalon Perles.

## 2020-03-12 NOTE — TELEPHONE ENCOUNTER
----- Message from Lynne Torres sent at 3/12/2020  1:25 PM CDT -----  Regarding: Visit Follow-up Question  Contact: [phone]  Dr. Cruz Hayden,    Is it normal to still have bouts shortness of breathe after having the flu?  For the past two days I have had

## 2020-03-12 NOTE — TELEPHONE ENCOUNTER
From: Willow Adams  To: Erick Rivas DO  Sent: 3/12/2020 1:25 PM CDT  Subject: Visit Follow-up Question    Dr. Silva Presume,    Is it normal to still have bouts shortness of breathe after having the flu?  For the past two days I have had times where my chest

## 2020-03-12 NOTE — TELEPHONE ENCOUNTER
Patient returning a call was treated for influenza A on 3/7/20. Patient concerned if normal to still be short of breath with exertion or talking. Patient is able to talk in full sentences with triage nurse on phone.     Still has some tightness of chest.

## 2020-03-12 NOTE — ED INITIAL ASSESSMENT (HPI)
Pt presents to the IC with c/o a sob. Pt was seen last week and dx with flu A. Pt was started on prednisone and feeling better but in the last 48 hours notes the SOB was getting worse. Pt has been using his inhaler without much relief.  Last use at 2pm toda

## 2020-03-12 NOTE — ED PROVIDER NOTES
Patient Seen in: [address]      History   Patient presents with:  Cough/URI    Stated Complaint: cough    HPI  Patient is an asthmatic male who presents to immediate care complaining of increasing shortness of breath. in HPI. Constitutional and vital signs reviewed. All other systems reviewed and negative except as noted above.     Physical Exam     ED Triage Vitals [03/12/20 1713]   BP (!) 140/97   Pulse 98   Resp 20   Temp 98.9 °F (37.2 °C)   Temp src Oral   SpO2 diagnosis)    Disposition:  Discharge  3/12/2020  5:26 pm    Follow-up:  Maria Teresa Gray DO  [address]  [phone]    In 3 days  If symptoms are not improving or go directly to the emergency room for any worsening

## 2020-03-17 ENCOUNTER — TELEPHONE (OUTPATIENT)
Dept: OTHER | Age: 40
End: 2020-03-17

## 2020-03-17 RX ORDER — PREDNISONE 20 MG/1
20 TABLET ORAL 2 TIMES DAILY
Qty: 10 TABLET | Refills: 0 | Status: SHIPPED | OUTPATIENT
Start: 2020-03-17 | End: 2020-03-22

## 2020-03-17 NOTE — TELEPHONE ENCOUNTER
Pt has had an exacerbation of his asthma over the last few weeks. Was seen in UC 3/3, ER 03/4, Office visit 03/07, UC 03/12. States that he continues to have trouble with getting a deep breath.   Has had nebulizer treatments (2 today at home) also has inh

## 2020-03-17 NOTE — TELEPHONE ENCOUNTER
Spoke with patient, (  verified ) informed of Dr. Preethi Sawyer  instructions below   Patient verbalizes understanding and agrees

## 2020-03-20 ENCOUNTER — TELEPHONE (OUTPATIENT)
Dept: OTHER | Age: 40
End: 2020-03-20

## 2020-03-20 NOTE — TELEPHONE ENCOUNTER
Patient called and advised he used a different thermometer (an oral thermometer) and got a normal temp twice.  He got 96 Temp the two times he checked it

## 2020-03-20 NOTE — TELEPHONE ENCOUNTER
Pt states he is taking Prednisone as prescribed 3/17/2020. Pt states he felt better yesterday but again today had some shortness of breath this morning, felt like it was hard to get a deep breath and some chest heaviness.  Pt states cough is dry and he does

## 2020-03-21 ENCOUNTER — APPOINTMENT (OUTPATIENT)
Dept: GENERAL RADIOLOGY | Facility: HOSPITAL | Age: 40
End: 2020-03-21
Attending: EMERGENCY MEDICINE
Payer: COMMERCIAL

## 2020-03-21 ENCOUNTER — HOSPITAL ENCOUNTER (EMERGENCY)
Facility: HOSPITAL | Age: 40
Discharge: HOME | End: 2020-03-21
Attending: EMERGENCY MEDICINE
Payer: COMMERCIAL

## 2020-03-21 VITALS
HEIGHT: 71 IN | TEMPERATURE: 98 F | WEIGHT: 252 LBS | BODY MASS INDEX: 35.28 KG/M2 | RESPIRATION RATE: 20 BRPM | HEART RATE: 83 BPM | SYSTOLIC BLOOD PRESSURE: 150 MMHG | DIASTOLIC BLOOD PRESSURE: 80 MMHG | OXYGEN SATURATION: 96 %

## 2020-03-21 DIAGNOSIS — J02.9 ACUTE PHARYNGITIS, UNSPECIFIED ETIOLOGY: Primary | ICD-10-CM

## 2020-03-21 LAB
ANION GAP SERPL CALC-SCNC: 8 MMOL/L (ref 0–18)
BASOPHILS # BLD AUTO: 0.05 X10(3) UL (ref 0–0.2)
BASOPHILS NFR BLD AUTO: 0.4 %
BUN BLD-MCNC: 15 MG/DL (ref 7–18)
BUN/CREAT SERPL: 14.9 (ref 10–20)
CALCIUM BLD-MCNC: 10.1 MG/DL (ref 8.5–10.1)
CHLORIDE SERPL-SCNC: 104 MMOL/L (ref 98–112)
CO2 SERPL-SCNC: 25 MMOL/L (ref 21–32)
CREAT BLD-MCNC: 1.01 MG/DL (ref 0.7–1.3)
DEPRECATED RDW RBC AUTO: 37.1 FL (ref 35.1–46.3)
EOSINOPHIL # BLD AUTO: 0.02 X10(3) UL (ref 0–0.7)
EOSINOPHIL NFR BLD AUTO: 0.1 %
ERYTHROCYTE [DISTWIDTH] IN BLOOD BY AUTOMATED COUNT: 12.3 % (ref 11–15)
GLUCOSE BLD-MCNC: 112 MG/DL (ref 70–99)
HCT VFR BLD AUTO: 47.4 % (ref 39–53)
HGB BLD-MCNC: 16.8 G/DL (ref 13–17.5)
IMM GRANULOCYTES # BLD AUTO: 0.07 X10(3) UL (ref 0–1)
IMM GRANULOCYTES NFR BLD: 0.5 %
LYMPHOCYTES # BLD AUTO: 1.84 X10(3) UL (ref 1–4)
LYMPHOCYTES NFR BLD AUTO: 13.2 %
MCH RBC QN AUTO: 29.7 PG (ref 26–34)
MCHC RBC AUTO-ENTMCNC: 35.4 G/DL (ref 31–37)
MCV RBC AUTO: 83.7 FL (ref 80–100)
MONOCYTES # BLD AUTO: 0.83 X10(3) UL (ref 0.1–1)
MONOCYTES NFR BLD AUTO: 5.9 %
NEUTROPHILS # BLD AUTO: 11.17 X10 (3) UL (ref 1.5–7.7)
NEUTROPHILS # BLD AUTO: 11.17 X10(3) UL (ref 1.5–7.7)
NEUTROPHILS NFR BLD AUTO: 79.9 %
OSMOLALITY SERPL CALC.SUM OF ELEC: 286 MOSM/KG (ref 275–295)
PLATELET # BLD AUTO: 420 10(3)UL (ref 150–450)
POTASSIUM SERPL-SCNC: 3.7 MMOL/L (ref 3.5–5.1)
RBC # BLD AUTO: 5.66 X10(6)UL (ref 4.3–5.7)
SODIUM SERPL-SCNC: 137 MMOL/L (ref 136–145)
WBC # BLD AUTO: 14 X10(3) UL (ref 4–11)

## 2020-03-21 PROCEDURE — 71045 X-RAY EXAM CHEST 1 VIEW: CPT | Performed by: EMERGENCY MEDICINE

## 2020-03-21 PROCEDURE — 85025 COMPLETE CBC W/AUTO DIFF WBC: CPT | Performed by: EMERGENCY MEDICINE

## 2020-03-21 PROCEDURE — 93010 ELECTROCARDIOGRAM REPORT: CPT | Performed by: EMERGENCY MEDICINE

## 2020-03-21 PROCEDURE — 80048 BASIC METABOLIC PNL TOTAL CA: CPT | Performed by: EMERGENCY MEDICINE

## 2020-03-21 PROCEDURE — 93005 ELECTROCARDIOGRAM TRACING: CPT

## 2020-03-21 PROCEDURE — 99284 EMERGENCY DEPT VISIT MOD MDM: CPT

## 2020-03-21 PROCEDURE — 96360 HYDRATION IV INFUSION INIT: CPT

## 2020-03-21 RX ORDER — AMOXICILLIN 500 MG/1
500 TABLET, FILM COATED ORAL 3 TIMES DAILY
Qty: 30 TABLET | Refills: 0 | Status: SHIPPED | OUTPATIENT
Start: 2020-03-21 | End: 2020-03-31

## 2020-03-22 NOTE — ED PROVIDER NOTES
Patient Seen in: [facility] AND [facility] Emergency Department      History   Patient presents with:  Dyspnea ERIC SOB    Stated Complaint: sob    HPI    17-year-old male patient presents complaining of slight shortness of breath breath despite having used albut Unremarkable tympanic membrane's. Neck: Supple. No meningismus. Tender anterior cervical lymph nodes.   Heart: Regular rate, regular rhythm, no murmur  Lungs: Normal respiratory effort, clear lungs  Abdomen: Soft,  nondistended, non tender  : No CVA [address] [place] [address] [address] [address]  [phone]    Schedule an appointment as soon as possible for a visit  For reevaluation of your symptoms        Medications Prescribed:  Current Discharge Medication List    START taking these medications    amoxicil

## 2020-03-22 NOTE — ED NOTES
Pt reports he was diagnosed with influenza A on 3/4/20. He was on a prednisone regimen and 'felt great' after about two weeks of the diagnosis.   Pt reports that as of about 2-3 days ago, he has been feeling a 'tightness' in his chest where he can't 'get i

## 2020-03-22 NOTE — ED INITIAL ASSESSMENT (HPI)
Patient presents to ER with c/o chest tightness with deep breathing. Patient seen here earlier this  Month and diagnosed with influenza A. States he does not feel any better. +sore throat. Denies sick contact or recent travel.

## 2020-03-31 ENCOUNTER — TELEPHONE (OUTPATIENT)
Dept: SURGERY | Facility: CLINIC | Age: 40
End: 2020-03-31

## 2020-04-11 RX ORDER — LISINOPRIL AND HYDROCHLOROTHIAZIDE 12.5; 1 MG/1; MG/1
1 TABLET ORAL
Qty: 90 TABLET | Refills: 1 | Status: SHIPPED | OUTPATIENT
Start: 2020-04-11 | End: 2020-07-07

## 2020-04-11 RX ORDER — AMLODIPINE BESYLATE 10 MG/1
10 TABLET ORAL
Qty: 90 TABLET | Refills: 1 | Status: SHIPPED | OUTPATIENT
Start: 2020-04-11 | End: 2020-10-30

## 2020-04-12 ENCOUNTER — TELEPHONE (OUTPATIENT)
Dept: FAMILY MEDICINE CLINIC | Facility: CLINIC | Age: 40
End: 2020-04-12

## 2020-04-14 NOTE — TELEPHONE ENCOUNTER
Paged by patient. Reports that he was diagnosed with the flu last month. Although overall, he feels he has improved he still has congestion and an occasional cough. Cough comes in fits. Sometimes he feels that his exercise capacity has reduced.   Cough

## 2020-04-14 NOTE — TELEPHONE ENCOUNTER
Paging    Message # [id number] 2020 10:22a [EMMD]  To:  From: JB Mays MD:  Phone#:  ----------------------------------------------------------------------  PRISCILA MUÑOZ, [phone],  76, PT HAD FLU LAST MONTH, SOME  SYMPTOMS LINGERING  Pa

## 2020-04-21 ENCOUNTER — TELEPHONE (OUTPATIENT)
Dept: FAMILY MEDICINE CLINIC | Facility: CLINIC | Age: 40
End: 2020-04-21

## 2020-04-21 ENCOUNTER — PATIENT MESSAGE (OUTPATIENT)
Dept: FAMILY MEDICINE CLINIC | Facility: CLINIC | Age: 40
End: 2020-04-21

## 2020-04-21 NOTE — TELEPHONE ENCOUNTER
Was seen in the ED on 3/21/20 for respiratory infection. Spoke to Dr. Fatuma Walden on 4/12/20 about the same symptoms. He stated she would like on to speak to Dr. Jaydon Tee. Instructed will be in tomorrow and he stated he will wait for her phone call.   He wo

## 2020-04-21 NOTE — TELEPHONE ENCOUNTER
Addressed at 4/21/20 telephone encounter. From: Sujata Mane  To: Lainey Donahue DO  Sent: 4/21/2020 10:00 AM CDT  Subject: Visit Follow-up Question    Dr. Joel Agustin,    Can do tomorrow's visit virtually?  I'd like to follow up on the breathing issue

## 2020-04-22 ENCOUNTER — VIRTUAL PHONE E/M (OUTPATIENT)
Dept: FAMILY MEDICINE CLINIC | Facility: CLINIC | Age: 40
End: 2020-04-22

## 2020-04-22 DIAGNOSIS — R06.02 SHORTNESS OF BREATH: Primary | ICD-10-CM

## 2020-04-22 PROCEDURE — 99213 OFFICE O/P EST LOW 20 MIN: CPT | Performed by: FAMILY MEDICINE

## 2020-04-22 NOTE — PROGRESS NOTES
Virtual Telephone Check-In    Sunshine Torres verbally consents to a Virtual/Telephone Check-In visit on 04/22/20. Patient understands and accepts financial responsibility for any deductible, co-insurance and/or co-pays associated with this service.

## 2020-06-08 ENCOUNTER — NURSE TRIAGE (OUTPATIENT)
Dept: FAMILY MEDICINE CLINIC | Facility: CLINIC | Age: 40
End: 2020-06-08

## 2020-06-08 ENCOUNTER — PATIENT MESSAGE (OUTPATIENT)
Dept: FAMILY MEDICINE CLINIC | Facility: CLINIC | Age: 40
End: 2020-06-08

## 2020-06-08 ENCOUNTER — VIRTUAL PHONE E/M (OUTPATIENT)
Dept: FAMILY MEDICINE CLINIC | Facility: CLINIC | Age: 40
End: 2020-06-08

## 2020-06-08 DIAGNOSIS — R10.9 LEFT SIDED ABDOMINAL PAIN: Primary | ICD-10-CM

## 2020-06-08 PROCEDURE — 99442 PHONE E/M BY PHYS 11-20 MIN: CPT | Performed by: FAMILY MEDICINE

## 2020-06-08 NOTE — TELEPHONE ENCOUNTER
Action Requested: Summary for Provider     []  Critical Lab, Recommendations Needed  [] Need Additional Advice  [x]   FYI    []   Need Orders  [] Need Medications Sent to Pharmacy  []  Other     SUMMARY:   Spoke with pt,  verified, pt stated the last 2

## 2020-06-08 NOTE — TELEPHONE ENCOUNTER
Please call patient RE: MyChart message below--message sent to patient to return call to triage    Non-Urgent Medical Question     Jaziel De La Torre DO 1 hour ago (10:19 AM)        Dr. Armin Echeverria,     I noticed a dull ache in my lower left

## 2020-06-08 NOTE — PROGRESS NOTES
Virtual Telephone Check-In    Sunshine Torres verbally consents to a Virtual/Telephone Check-In visit on 06/08/20. Patient has been referred to the [facility] website at [email or web address] to review the yearly Consent to Treat document.     Patient unders are ordered as detailed in the plan of care above.         Gómez Anton, DO

## 2020-06-08 NOTE — TELEPHONE ENCOUNTER
From: Fatoumata Dixon  To: Lanette Rizo DO  Sent: 6/8/2020 10:19 AM CDT  Subject: Non-Urgent Medical Question    Dr. Shanell Braden,    I noticed a dull ache in my lower left abdomen on Saturday night, it was there a bit yesterday but not bad.  Today, however i

## 2020-06-09 ENCOUNTER — TELEPHONE (OUTPATIENT)
Dept: SURGERY | Facility: CLINIC | Age: 40
End: 2020-06-09

## 2020-06-09 NOTE — TELEPHONE ENCOUNTER
Do to COVID-19 (Coronavirus) global pandemic and modified physician's face to face office hours, called patient to offer to reschedule his consult with Dr. Carlos Lozano.   Informed patient that Dr. Adam Chatman does not have office hours tomorrow, except virtual visits

## 2020-06-15 NOTE — TELEPHONE ENCOUNTER
Pt called stating pt's consult for vas with Dr. Nguyễn Rodríguez was canceled and reschedule to see the nurse practitioner. Pt came in for the appointment but was told by the nurse practitioner pt could not be seen. Pt asking to speak to the .   Pt

## 2020-06-15 NOTE — H&P
HPI:     Supa Saldaña is a 37year old male with a PMH of HTN, asthma, eczema. He presents as a consult from Dr Tal Penn office to discuss vasectomy. AUA SS is 2/35 with 1/5 nocturia, frequency.     Number of children: 2 girls    He desires perman tablet (10 mg total) by mouth once daily. 90 tablet 1   • Lisinopril-hydroCHLOROthiazide 10-12.5 MG Oral Tab Take 1 tablet by mouth once daily.  90 tablet 1   • benzonatate (TESSALON PERLES) 100 MG Oral Cap Take 1 capsule (100 mg total) by mouth 3 (three) t

## 2020-06-15 NOTE — TELEPHONE ENCOUNTER
RN called this patient and apologized about the confusions of his appointment. He wants a sooner Vas consult appointment. Charlie Gonsales. Agreeable. Dr Derek Thornton made aware.      Dr Derek Thornton ok to see him on Friday, 6/19 at 8:30am. Left message to patient to

## 2020-06-15 NOTE — TELEPHONE ENCOUNTER
RN received a call back from this patient agreeing to Friday appointment, 6/19 with Dr Johnson Mckeon for a vasectomy consult. Explained that he will be at 8:30am. Patient verbalized understanding.

## 2020-06-19 ENCOUNTER — OFFICE VISIT (OUTPATIENT)
Dept: SURGERY | Facility: CLINIC | Age: 40
End: 2020-06-19
Payer: COMMERCIAL

## 2020-06-19 VITALS
DIASTOLIC BLOOD PRESSURE: 79 MMHG | HEART RATE: 77 BPM | WEIGHT: 256 LBS | SYSTOLIC BLOOD PRESSURE: 145 MMHG | HEIGHT: 71.5 IN | BODY MASS INDEX: 35.06 KG/M2

## 2020-06-19 DIAGNOSIS — Z30.2 ENCOUNTER FOR STERILIZATION: Primary | ICD-10-CM

## 2020-06-19 PROCEDURE — 99243 OFF/OP CNSLTJ NEW/EST LOW 30: CPT | Performed by: UROLOGY

## 2020-06-19 RX ORDER — DIAZEPAM 10 MG/1
10 TABLET ORAL SEE ADMIN INSTRUCTIONS
Qty: 2 TABLET | Refills: 0 | Status: SHIPPED | OUTPATIENT
Start: 2020-06-19 | End: 2021-02-08

## 2020-06-19 RX ORDER — TRAMADOL HYDROCHLORIDE 50 MG/1
50 TABLET ORAL EVERY 6 HOURS PRN
Qty: 15 TABLET | Refills: 0 | Status: SHIPPED | OUTPATIENT
Start: 2020-06-19 | End: 2020-10-05 | Stop reason: ALTCHOICE

## 2020-07-01 ENCOUNTER — TELEPHONE (OUTPATIENT)
Dept: FAMILY MEDICINE CLINIC | Facility: CLINIC | Age: 40
End: 2020-07-01

## 2020-07-01 RX ORDER — LISINOPRIL AND HYDROCHLOROTHIAZIDE 25; 20 MG/1; MG/1
TABLET ORAL
Qty: 90 TABLET | Refills: 0 | Status: SHIPPED | OUTPATIENT
Start: 2020-07-01 | End: 2021-02-08

## 2020-07-01 NOTE — TELEPHONE ENCOUNTER
Patient notified of provider's recommendation. Patient verbalized understanding. Patient will call other pharmacies in case there is availability of lisinopril/hctz 10/12.5mg;  If not he will take half tab of new RX sent.

## 2020-07-01 NOTE — TELEPHONE ENCOUNTER
Received a call from Levi root at [facility]. Patient's Lisinopril-hydroCHLOROthiazide 10-12.5 MG is on back order. She is requesting to fill lisinopril Hydrochlorothiazide 20/25 and advise to take 1/2 tab?        Script pended for review/ approval

## 2020-07-03 NOTE — PROGRESS NOTES
HPI:     Chandu Galvin is a 37year old male with a PMH of HTN, asthma, eczema. He presents today for office vasectomy. Procedure performed without issue.  I reminded him to use contraception until after we very he has a negative semen sample whic post-operative instructions.      The patient tolerated the procedure well and left in satisfactory condition without complaints & reminded to wear his athletic supporter for a minimum of 1 week.    __________________________________________      Prior note Cannabis       Medications (Active prior to today's visit):  Current Outpatient Medications   Medication Sig Dispense Refill   • Lisinopril-hydroCHLOROthiazide 20-25 MG Oral Tab Take one half tab daily 90 tablet 0   • diazepam (VALIUM) 10 MG Oral Tab Take

## 2020-07-07 ENCOUNTER — TELEPHONE (OUTPATIENT)
Dept: INTERNAL MEDICINE CLINIC | Facility: CLINIC | Age: 40
End: 2020-07-07

## 2020-07-07 RX ORDER — LISINOPRIL AND HYDROCHLOROTHIAZIDE 12.5; 1 MG/1; MG/1
TABLET ORAL
Qty: 90 TABLET | Refills: 0 | Status: SHIPPED | OUTPATIENT
Start: 2020-07-07 | End: 2020-10-30

## 2020-07-07 NOTE — TELEPHONE ENCOUNTER
Prudence Torrez at [address] of notes below. Patient has not picked up the 20/25mg lisinopril/hydrochlorothiazide yet. She will contact the patient.

## 2020-07-10 ENCOUNTER — PROCEDURE (OUTPATIENT)
Dept: SURGERY | Facility: CLINIC | Age: 40
End: 2020-07-10
Payer: COMMERCIAL

## 2020-07-10 VITALS — SYSTOLIC BLOOD PRESSURE: 130 MMHG | DIASTOLIC BLOOD PRESSURE: 84 MMHG

## 2020-07-10 DIAGNOSIS — Z30.2 ENCOUNTER FOR STERILIZATION: Primary | ICD-10-CM

## 2020-07-10 PROCEDURE — 55250 REMOVAL OF SPERM DUCT(S): CPT | Performed by: UROLOGY

## 2020-07-14 ENCOUNTER — PATIENT MESSAGE (OUTPATIENT)
Dept: FAMILY MEDICINE CLINIC | Facility: CLINIC | Age: 40
End: 2020-07-14

## 2020-07-18 ENCOUNTER — PATIENT MESSAGE (OUTPATIENT)
Dept: SURGERY | Facility: CLINIC | Age: 40
End: 2020-07-18

## 2020-07-22 RX ORDER — AMOXICILLIN AND CLAVULANATE POTASSIUM 875; 125 MG/1; MG/1
1 TABLET, FILM COATED ORAL 2 TIMES DAILY
Qty: 14 TABLET | Refills: 0 | Status: SHIPPED | OUTPATIENT
Start: 2020-07-22 | End: 2020-07-29

## 2020-07-22 NOTE — TELEPHONE ENCOUNTER
RN called patient to relay MD's message and recommendations, 'If he is noticing a little pus then it would probably be a good idea for him to start abx. I sent this to his pharmacy. I'm also happy to take a look at it on Friday if he wants to come in.  Coul

## 2020-07-22 NOTE — TELEPHONE ENCOUNTER
From: Avis Krause  To: Elin Anton MD  Sent: 7/18/2020 8:09 PM CDT  Subject: Visit Follow-up Question    Doc,    The incision on my left side lost the glue 3 days ago and now there is a bit of pus in the wound.  It is a bit red around the wound, but

## 2020-07-22 NOTE — TELEPHONE ENCOUNTER
RN forwarding patient's [employer] message to Dr Clarissa Mederos. Patient is a S/P bilateral vasectomy on 7/10. Below is his patient's message: \"The incision on my left side lost the glue 3 days ago and now there is a bit of pus in the wound.  It is a bit red arou

## 2020-07-22 NOTE — TELEPHONE ENCOUNTER
If he is noticing a little pus then it would probably be a good idea for him to start abx. I sent this to his pharmacy. I'm also happy to take a look at it on Friday if he wants to come in.  Could potentially do 11 am. Or if he just wants to try abx and marlene

## 2020-07-29 ENCOUNTER — TELEPHONE (OUTPATIENT)
Dept: SURGERY | Facility: CLINIC | Age: 40
End: 2020-07-29

## 2020-07-29 NOTE — TELEPHONE ENCOUNTER
Per pt incision sight is not fully healed, states there is red ring and it looks like there is puss, but no discharge. Antibiotics did not help.  Transferred to RN

## 2020-07-29 NOTE — TELEPHONE ENCOUNTER
RN received a call from this patient. He said that he has three incisions on his scrotum, 2 at the right, 1 at the left. He said that the right incisions healed really well but the left side is still PINK. Denies pain. No drainage.  No tenderness or swellin

## 2020-08-03 NOTE — TELEPHONE ENCOUNTER
RN forwarding to MD for advise of sooner availability. Note, patient is a S/P homa vasectomy on 7/10. Patient feels that the surgical site is not healing.

## 2020-08-03 NOTE — PROGRESS NOTES
HPI:     Avtar Plasencia is a 37year old male with a PMH of HTN, asthma, eczema. He is s/p office vasectomy on 7/10/20. He presents today with concerns the left incision is not healing appropriately.  He noted some discharge on 7/22 and I prescr (Active prior to today's visit):  Current Outpatient Medications   Medication Sig Dispense Refill   • Sulfamethoxazole-TMP -160 MG Oral Tab per tablet Take 1 tablet by mouth 2 (two) times daily for 7 days.  14 tablet 0   • LISINOPRIL-HYDROCHLOROTHIAZI bactrim x 7 days  - SA 90 days post-vas    Thank you very much for this consult.     Iain Gonzalez MD, [address] [place] [address] Street

## 2020-08-03 NOTE — TELEPHONE ENCOUNTER
RN called patient to offer Tues or Wed appt with Dr Kaia Melissa. He accepted 8/5 2:30pm. No questions at this time.

## 2020-08-05 ENCOUNTER — OFFICE VISIT (OUTPATIENT)
Dept: SURGERY | Facility: CLINIC | Age: 40
End: 2020-08-05

## 2020-08-05 DIAGNOSIS — N50.89 SCROTAL SWELLING: Primary | ICD-10-CM

## 2020-08-05 PROCEDURE — 99024 POSTOP FOLLOW-UP VISIT: CPT | Performed by: UROLOGY

## 2020-08-05 RX ORDER — SULFAMETHOXAZOLE AND TRIMETHOPRIM 800; 160 MG/1; MG/1
1 TABLET ORAL 2 TIMES DAILY
Qty: 14 TABLET | Refills: 0 | Status: SHIPPED | OUTPATIENT
Start: 2020-08-05 | End: 2020-08-12

## 2020-08-14 ENCOUNTER — OFFICE VISIT (OUTPATIENT)
Dept: FAMILY MEDICINE CLINIC | Facility: CLINIC | Age: 40
End: 2020-08-14

## 2020-08-14 VITALS
BODY MASS INDEX: 34.81 KG/M2 | SYSTOLIC BLOOD PRESSURE: 119 MMHG | WEIGHT: 254.19 LBS | HEIGHT: 71.5 IN | HEART RATE: 89 BPM | DIASTOLIC BLOOD PRESSURE: 82 MMHG

## 2020-08-14 DIAGNOSIS — I10 ESSENTIAL HYPERTENSION: Primary | ICD-10-CM

## 2020-08-14 DIAGNOSIS — F41.9 ANXIETY: ICD-10-CM

## 2020-08-14 DIAGNOSIS — J30.2 SEASONAL ALLERGIES: ICD-10-CM

## 2020-08-14 PROCEDURE — 3008F BODY MASS INDEX DOCD: CPT | Performed by: FAMILY MEDICINE

## 2020-08-14 PROCEDURE — 3074F SYST BP LT 130 MM HG: CPT | Performed by: FAMILY MEDICINE

## 2020-08-14 PROCEDURE — 3079F DIAST BP 80-89 MM HG: CPT | Performed by: FAMILY MEDICINE

## 2020-08-14 PROCEDURE — 99213 OFFICE O/P EST LOW 20 MIN: CPT | Performed by: FAMILY MEDICINE

## 2020-08-14 RX ORDER — MULTIVIT-MIN/IRON FUM/FOLIC AC 7.5 MG-4
1 TABLET ORAL DAILY
COMMUNITY
End: 2021-04-06

## 2020-08-14 RX ORDER — FLUTICASONE PROPIONATE 50 MCG
1 SPRAY, SUSPENSION (ML) NASAL DAILY
COMMUNITY
End: 2021-02-08

## 2020-08-14 NOTE — PROGRESS NOTES
HPI: Rios Mcmahan is a 37year old male who presents for HTN follow-up. Had recent infection in left vasectomy incision. Took 2 courses of antibiotics. Has resolved now. Taking Lisinopril 10mg- HCTZ 12.5mg. Lost 2 pounds. Last labs done in March 2020.   Denies 0  Mometasone Furoate (ELOCON) 0.1 % External Cream, Apply 1 Application topically 2 (two) times daily as needed. , Disp: 50 g, Rfl: 1  Chlorphen-Pseudoephed-APAP (CORICIDIN D OR), Take by mouth., Disp: , Rfl:     No current facility-administered medication

## 2020-09-17 ENCOUNTER — HOSPITAL ENCOUNTER (OUTPATIENT)
Age: 40
Discharge: HOME | End: 2020-09-17
Payer: COMMERCIAL

## 2020-09-17 VITALS
WEIGHT: 245 LBS | RESPIRATION RATE: 16 BRPM | HEIGHT: 71 IN | OXYGEN SATURATION: 100 % | DIASTOLIC BLOOD PRESSURE: 76 MMHG | HEART RATE: 94 BPM | BODY MASS INDEX: 34.3 KG/M2 | TEMPERATURE: 98 F | SYSTOLIC BLOOD PRESSURE: 118 MMHG

## 2020-09-17 DIAGNOSIS — J02.0 PHARYNGITIS DUE TO STREPTOCOCCUS SPECIES: Primary | ICD-10-CM

## 2020-09-17 LAB — S PYO AG THROAT QL: POSITIVE

## 2020-09-17 PROCEDURE — 87880 STREP A ASSAY W/OPTIC: CPT | Performed by: PHYSICIAN ASSISTANT

## 2020-09-17 PROCEDURE — 99203 OFFICE O/P NEW LOW 30 MIN: CPT | Performed by: PHYSICIAN ASSISTANT

## 2020-09-17 RX ORDER — AMOXICILLIN 500 MG/1
500 TABLET, FILM COATED ORAL 2 TIMES DAILY
Qty: 14 TABLET | Refills: 0 | Status: SHIPPED | OUTPATIENT
Start: 2020-09-17 | End: 2020-09-24

## 2020-09-17 NOTE — ED PROVIDER NOTES
Patient Seen in: [address]      History   Patient presents with:  Sore Throat    Stated Complaint: strep test    HPI    42-year-old male with past medical history of hypertension, hyperlipidemia here for evaluation of s atraumatic. Right Ear: External ear normal.      Left Ear: External ear normal.      Nose: Nose normal.      Mouth/Throat:      Mouth: Mucous membranes are moist.      Pharynx: Uvula midline. Posterior oropharyngeal erythema present.  No pharyngeal swe Tab  Take 1 tablet (500 mg total) by mouth 2 (two) times daily for 7 days.   Qty: 14 tablet Refills: 0

## 2020-09-17 NOTE — ED INITIAL ASSESSMENT (HPI)
Pt c/o scratchy throat since this am. States wife dx'd with strep 3 days ago. States going out of town to [place] this weekend. No fever. Awake/alert. Breathing easy and even without distress. Speech clear. Skin warm, dry and pink.

## 2020-09-19 LAB — SARS-COV-2 RNA,QUAL, RT-PCR: NOT DETECTED

## 2020-09-23 ENCOUNTER — TELEPHONE (OUTPATIENT)
Dept: FAMILY MEDICINE CLINIC | Facility: CLINIC | Age: 40
End: 2020-09-23

## 2020-09-23 RX ORDER — AMOXICILLIN 500 MG/1
500 CAPSULE ORAL 2 TIMES DAILY
Qty: 6 CAPSULE | Refills: 0 | Status: SHIPPED | OUTPATIENT
Start: 2020-09-23 | End: 2020-10-05 | Stop reason: ALTCHOICE

## 2020-09-23 NOTE — TELEPHONE ENCOUNTER
Strep is generally covered with Amoxicillin. He should have had a 10 day course, which doesn't seem to be the case. I usually give 500mg BID for 10 days. Maybe he just needs a longer course? I can send in additional days.

## 2020-09-23 NOTE — TELEPHONE ENCOUNTER
Patient calling stating  he was seen in immediate care on 9/17/20 and diagnosed with strep throat.    Per patient, he was given amoxicillin and has one more dose left today and his  throat feels dry and hurst.   Per patient, his wife also  had strep throat

## 2020-09-26 ENCOUNTER — IMMUNIZATION (OUTPATIENT)
Dept: FAMILY MEDICINE CLINIC | Facility: CLINIC | Age: 40
End: 2020-09-26

## 2020-09-26 DIAGNOSIS — Z23 NEED FOR VACCINATION: ICD-10-CM

## 2020-09-26 PROCEDURE — 90471 IMMUNIZATION ADMIN: CPT | Performed by: FAMILY MEDICINE

## 2020-09-26 PROCEDURE — 90686 IIV4 VACC NO PRSV 0.5 ML IM: CPT | Performed by: FAMILY MEDICINE

## 2020-10-03 ENCOUNTER — HOSPITAL ENCOUNTER (OUTPATIENT)
Age: 40
Discharge: HOME | End: 2020-10-03
Payer: COMMERCIAL

## 2020-10-03 VITALS
RESPIRATION RATE: 20 BRPM | WEIGHT: 260 LBS | TEMPERATURE: 99 F | DIASTOLIC BLOOD PRESSURE: 90 MMHG | HEART RATE: 112 BPM | BODY MASS INDEX: 36.4 KG/M2 | OXYGEN SATURATION: 98 % | SYSTOLIC BLOOD PRESSURE: 147 MMHG | HEIGHT: 71 IN

## 2020-10-03 DIAGNOSIS — J02.9 VIRAL PHARYNGITIS: Primary | ICD-10-CM

## 2020-10-03 PROCEDURE — 87880 STREP A ASSAY W/OPTIC: CPT | Performed by: PHYSICIAN ASSISTANT

## 2020-10-03 PROCEDURE — 99213 OFFICE O/P EST LOW 20 MIN: CPT | Performed by: PHYSICIAN ASSISTANT

## 2020-10-03 RX ORDER — DEXAMETHASONE SODIUM PHOSPHATE 10 MG/ML
10 INJECTION, SOLUTION INTRAMUSCULAR; INTRAVENOUS ONCE
Status: COMPLETED | OUTPATIENT
Start: 2020-10-03 | End: 2020-10-03

## 2020-10-03 NOTE — ED INITIAL ASSESSMENT (HPI)
Patient states his wife got reinfected with strep and diagnosed with it yesterday, states he is having a sore throat x several hours. Patient states having fatigue.

## 2020-10-03 NOTE — ED PROVIDER NOTES
Patient Seen in: [address]      History   Patient presents with:  Sore Throat    Stated Complaint: strep test    HPI    39-year-old male with no past medical history here for evaluation of sore, irritated throat.   [place] Musculoskeletal: Normal range of motion. Cardiovascular:      Rate and Rhythm: Normal rate. Pulmonary:      Effort: Pulmonary effort is normal.   Abdominal:      General: Abdomen is flat. Musculoskeletal: Normal range of motion.    Skin:     General:

## 2020-10-05 ENCOUNTER — TELEMEDICINE (OUTPATIENT)
Dept: FAMILY MEDICINE CLINIC | Facility: CLINIC | Age: 40
End: 2020-10-05

## 2020-10-05 DIAGNOSIS — R52 BODY ACHES: ICD-10-CM

## 2020-10-05 DIAGNOSIS — J02.9 PHARYNGITIS, UNSPECIFIED ETIOLOGY: Primary | ICD-10-CM

## 2020-10-05 PROCEDURE — 99213 OFFICE O/P EST LOW 20 MIN: CPT | Performed by: FAMILY MEDICINE

## 2020-10-05 RX ORDER — AMOXICILLIN AND CLAVULANATE POTASSIUM 875; 125 MG/1; MG/1
1 TABLET, FILM COATED ORAL 2 TIMES DAILY
Qty: 20 TABLET | Refills: 0 | Status: SHIPPED | OUTPATIENT
Start: 2020-10-05 | End: 2020-10-15

## 2020-10-05 NOTE — PROGRESS NOTES
HPI: Maegan Nolasco is a 37year old male who presents for URI. Had strep and then resolved. 3 days ago, he developed sore throat again. Went to [facility] and had negative strep. For the rest of the day, he felt achy and sore.   Had rapid COVID test y Oral Tab, Take 1 tablet (10 mg total) by mouth once daily. , Disp: 90 tablet, Rfl: 1    •  benzonatate (TESSALON PERLES) 100 MG Oral Cap, Take 1 capsule (100 mg total) by mouth 3 (three) times daily as needed. , Disp: 21 capsule, Rfl: 0    •  Mometasone Furo

## 2020-10-13 ENCOUNTER — PATIENT MESSAGE (OUTPATIENT)
Dept: SURGERY | Facility: CLINIC | Age: 40
End: 2020-10-13

## 2020-10-14 NOTE — TELEPHONE ENCOUNTER
From: Mady Horton  To: Олег Badillo MD  Sent: 10/13/2020 10:19 PM CDT  Subject: Visit Follow-up Question    Dr Ana Mtz,    It has been 90 days since my procedure. Should I drop the sample off for testing?      Thank you,    Alyssia Cobb

## 2020-10-16 ENCOUNTER — LAB ENCOUNTER (OUTPATIENT)
Dept: LAB | Facility: HOSPITAL | Age: 40
End: 2020-10-16
Attending: UROLOGY
Payer: COMMERCIAL

## 2020-10-16 DIAGNOSIS — Z30.2 ENCOUNTER FOR STERILIZATION: ICD-10-CM

## 2020-10-16 PROCEDURE — 89321 SEMEN ANAL SPERM DETECTION: CPT

## 2020-10-16 NOTE — PROGRESS NOTES
Luis Manuel Savage,  I have reviewed your test results. Your semen analysis shows no sperm. You may now resume intercourse without the use of contraception. Please let me know if you have any questions or concerns.     Thanks,  Brook Newby MD

## 2020-10-26 ENCOUNTER — PATIENT MESSAGE (OUTPATIENT)
Dept: FAMILY MEDICINE CLINIC | Facility: CLINIC | Age: 40
End: 2020-10-26

## 2020-10-26 RX ORDER — MONTELUKAST SODIUM 10 MG/1
10 TABLET ORAL DAILY
Qty: 30 TABLET | Refills: 3 | Status: SHIPPED | OUTPATIENT
Start: 2020-10-26 | End: 2020-11-25

## 2020-10-26 NOTE — TELEPHONE ENCOUNTER
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 It comes and goes without any clear reason for coming or going.

## 2020-10-31 RX ORDER — AMLODIPINE BESYLATE 10 MG/1
10 TABLET ORAL
Qty: 90 TABLET | Refills: 1 | Status: SHIPPED | OUTPATIENT
Start: 2020-10-31 | End: 2021-04-23

## 2020-10-31 RX ORDER — LISINOPRIL AND HYDROCHLOROTHIAZIDE 12.5; 1 MG/1; MG/1
1 TABLET ORAL DAILY
Qty: 90 TABLET | Refills: 0 | Status: SHIPPED | OUTPATIENT
Start: 2020-10-31 | End: 2021-02-02

## 2020-11-02 ENCOUNTER — TELEMEDICINE (OUTPATIENT)
Dept: FAMILY MEDICINE CLINIC | Facility: CLINIC | Age: 40
End: 2020-11-02

## 2020-11-02 DIAGNOSIS — J30.2 SEASONAL ALLERGIES: ICD-10-CM

## 2020-11-02 DIAGNOSIS — J02.9 PHARYNGITIS, UNSPECIFIED ETIOLOGY: Primary | ICD-10-CM

## 2020-11-02 DIAGNOSIS — R07.89 CHEST WALL DISCOMFORT: ICD-10-CM

## 2020-11-02 PROCEDURE — 99213 OFFICE O/P EST LOW 20 MIN: CPT | Performed by: FAMILY MEDICINE

## 2020-11-02 NOTE — PROGRESS NOTES
HPI: Gay Randolph is a 37year old male who presents for chronic pharyngitis. Has had intermittent symptoms for the past few weeks. Has congestion and sinus pressure. Has ear congestion. I had prescribed Singulair last week which seems to be helping.   He als by mouth 3 (three) times daily as needed. , Disp: 21 capsule, Rfl: 0    •  Mometasone Furoate (ELOCON) 0.1 % External Cream, Apply 1 Application topically 2 (two) times daily as needed. , Disp: 50 g, Rfl: 1    •  Chlorphen-Pseudoephed-APAP (CORICIDIN D OR),

## 2020-11-27 RX ORDER — MONTELUKAST SODIUM 10 MG/1
10 TABLET ORAL DAILY
Qty: 30 TABLET | Refills: 3 | Status: SHIPPED | OUTPATIENT
Start: 2020-11-27 | End: 2021-02-08

## 2020-11-30 ENCOUNTER — TELEPHONE (OUTPATIENT)
Dept: FAMILY MEDICINE CLINIC | Facility: CLINIC | Age: 40
End: 2020-11-30

## 2020-11-30 ENCOUNTER — TELEMEDICINE (OUTPATIENT)
Dept: TELEHEALTH | Age: 40
End: 2020-11-30

## 2020-11-30 DIAGNOSIS — J34.89 SINUS PRESSURE: Primary | ICD-10-CM

## 2020-11-30 DIAGNOSIS — J30.2 SEASONAL ALLERGIES: ICD-10-CM

## 2020-11-30 DIAGNOSIS — J32.1 CHRONIC FRONTAL SINUSITIS: Primary | ICD-10-CM

## 2020-11-30 PROCEDURE — 99213 OFFICE O/P EST LOW 20 MIN: CPT | Performed by: NURSE PRACTITIONER

## 2020-11-30 NOTE — TELEPHONE ENCOUNTER
May be a good idea to see ENT to evaluate sinus pressure. I did a referral to Dr. Darrius Uribe who comes to our office on Mondays and Fridays.

## 2020-11-30 NOTE — PATIENT INSTRUCTIONS
If you haven't already done so, try Flonase (fluticasone) nasal spray. Talk to your PCP about seeing an allergist.     Seasonal Allergy  Seasonal allergy is also called hay fever.  An allergic reaction may occur after a person is exposed to pollens release · Antihistamines block the release of histamine during the allergic response. They may work better when taken before symptoms develop.  Unless a prescription antihistamine was prescribed, you can take over-the-counter antihistamines that don't cause drowsin Call your healthcare provider right away for any of the following:  · Facial, ear or sinus pain; colored drainage from the nose  · Headaches  · You have asthma and your asthma symptoms don't respond to the usual doses of your medicine  · Cough with colored Sinus headache may cause pain in different places, depending on which sinuses are infected. There may be pain in the temples, forehead, top of the head, behind or around the eye, across the cheekbone, or into the upper teeth.    You may find that changing y · You may use over-the-counter decongestants unless a similar medicine was prescribed. Nasal sprays or drops work the fastest. Use one that contains phenylephrine or oxymetazoline. First blow your nose gently to remove mucus. Then apply the spray or drops. · You may use over-the-counter medicine to control pain and fever, unless another pain medicine was prescribed. Talk with your provider before using acetaminophen or ibuprofen if you have chronic liver or kidney disease.  Also talk with your provider if you

## 2020-11-30 NOTE — TELEPHONE ENCOUNTER
Pt states has talked to Dr. Mary Anglin about his symptoms and was placed on Singulair.  Pt states he continues to have sinus pressure behind his eyes and ears, no ear pain, occasional sore throat, doesn't hurt when he swallows just a dry scratchy, feeling in hi

## 2020-11-30 NOTE — PROGRESS NOTES
Alma Rosa Esteban is a 37year old male. HPI:   Patient presents with:  Sinus Problem  Hay Fever    Encounter was conducted by video visit. Patient states he has been dealing with environmental allergies for several weeks.  He is taking Allegra and montelu Past Medical History:   Diagnosis Date   • Asthma     Cats   • Dyslipidemia    • Eczema    • Essential hypertension    • Hypertension Age 20      Social History:  Social History    Tobacco Use      Smoking status: Former Smoker        Packs/day: 1.00 Seasonal allergy is also called hay fever. An allergic reaction may occur after a person is exposed to pollens released from grasses, weeds, trees, and shrubs.  This type of allergy occurs during the spring, summer, or fall when pollens contact the lining o · Antihistamines block the release of histamine during the allergic response. They may work better when taken before symptoms develop.  Unless a prescription antihistamine was prescribed, you can take over-the-counter antihistamines that don't cause drowsin Call your healthcare provider right away for any of the following:  · Facial, ear or sinus pain; colored drainage from the nose  · Headaches  · You have asthma and your asthma symptoms don't respond to the usual doses of your medicine  · Cough with colored Sinus headache may cause pain in different places, depending on which sinuses are infected. There may be pain in the temples, forehead, top of the head, behind or around the eye, across the cheekbone, or into the upper teeth.    You may find that changing y · You may use over-the-counter decongestants unless a similar medicine was prescribed. Nasal sprays or drops work the fastest. Use one that contains phenylephrine or oxymetazoline. First blow your nose gently to remove mucus. Then apply the spray or drops. · You may use over-the-counter medicine to control pain and fever, unless another pain medicine was prescribed. Talk with your provider before using acetaminophen or ibuprofen if you have chronic liver or kidney disease.  Also talk with your provider if you

## 2020-12-01 ENCOUNTER — OFFICE VISIT (OUTPATIENT)
Dept: OTOLARYNGOLOGY | Facility: CLINIC | Age: 40
End: 2020-12-01

## 2020-12-01 VITALS — SYSTOLIC BLOOD PRESSURE: 138 MMHG | DIASTOLIC BLOOD PRESSURE: 91 MMHG | HEART RATE: 113 BPM

## 2020-12-01 DIAGNOSIS — J32.9 CHRONIC SINUSITIS, UNSPECIFIED LOCATION: Primary | ICD-10-CM

## 2020-12-01 DIAGNOSIS — J34.2 DEVIATED NASAL SEPTUM: ICD-10-CM

## 2020-12-01 PROCEDURE — 3080F DIAST BP >= 90 MM HG: CPT | Performed by: OTOLARYNGOLOGY

## 2020-12-01 PROCEDURE — 3075F SYST BP GE 130 - 139MM HG: CPT | Performed by: OTOLARYNGOLOGY

## 2020-12-01 PROCEDURE — 99243 OFF/OP CNSLTJ NEW/EST LOW 30: CPT | Performed by: OTOLARYNGOLOGY

## 2020-12-01 RX ORDER — METHYLPREDNISOLONE 4 MG/1
TABLET ORAL
Qty: 1 PACKAGE | Refills: 0 | Status: SHIPPED | OUTPATIENT
Start: 2020-12-01 | End: 2020-12-22 | Stop reason: ALTCHOICE

## 2020-12-01 NOTE — PROGRESS NOTES
Molina Luke is a 37year old male.   Patient presents with:  Sinus Problem: referred by PCP for acute sinusitis, facial pain, allergies for several months, allegra, flonase, singulair, significant facial pressure, congestion      HISTORY OF PRESENT ILL Age 20       Past Surgical History:   Procedure Laterality Date   • OTHER  Childhood    Bilateral myringotomy tubes   • VASECTOMY           REVIEW OF SYSTEMS    System Neg/Pos Details   Constitutional Negative Fatigue, fever and weight loss.    ENMT Negativ Normal. Septum -deviated turbinates - Right: Normal, Left: Normal.       Current Outpatient Medications:   •  methylPREDNISolone 4 MG Oral Tablet Therapy Pack, Take by oral route., Disp: 1 Package, Rfl: 0  •  Montelukast Sodium (SINGULAIR) 10 MG Oral Tab, undergo CT scan to evaluate his nasal and paranasal anatomy. Return to see me after scan to discuss further management. Continue all medications for now. Start prednisone to address some of his acute pain and discomfort. TMJ issues?         This note wa

## 2020-12-04 ENCOUNTER — PATIENT MESSAGE (OUTPATIENT)
Dept: OTOLARYNGOLOGY | Facility: CLINIC | Age: 40
End: 2020-12-04

## 2020-12-07 NOTE — TELEPHONE ENCOUNTER
From: Armando Lan  To: Tim Elam. Cherylene Scotts, MD  Sent: 12/4/2020 4:51 PM CST  Subject: Visit Follow-up Question    Dr. Cherylene Scotts,    I received a VM from radiology to call them back. I have called multiple times without a return call from them.  Is there someth

## 2020-12-11 ENCOUNTER — HOSPITAL ENCOUNTER (OUTPATIENT)
Dept: CT IMAGING | Facility: HOSPITAL | Age: 40
Discharge: HOME | End: 2020-12-11
Attending: OTOLARYNGOLOGY
Payer: COMMERCIAL

## 2020-12-11 DIAGNOSIS — J32.9 CHRONIC SINUSITIS, UNSPECIFIED LOCATION: ICD-10-CM

## 2020-12-11 PROCEDURE — 70486 CT MAXILLOFACIAL W/O DYE: CPT | Performed by: OTOLARYNGOLOGY

## 2020-12-14 ENCOUNTER — TELEPHONE (OUTPATIENT)
Dept: OTOLARYNGOLOGY | Facility: CLINIC | Age: 40
End: 2020-12-14

## 2020-12-14 ENCOUNTER — PATIENT MESSAGE (OUTPATIENT)
Dept: FAMILY MEDICINE CLINIC | Facility: CLINIC | Age: 40
End: 2020-12-14

## 2020-12-14 DIAGNOSIS — J32.1 CHRONIC FRONTAL SINUSITIS: Primary | ICD-10-CM

## 2020-12-14 NOTE — TELEPHONE ENCOUNTER
From: Sunshine Torres  To: Castillo Flor DO  Sent: 12/14/2020 2:18 PM CST  Subject: Referral Request    Dr. Warren Peña,    Can you please process a referral to Dr. Heidi Dominguez for me?  I have a follow up with him next week and they need a new referral.     Thank

## 2020-12-14 NOTE — TELEPHONE ENCOUNTER
Per pt confused on mychart msg received regarding CT results, requesting to speak to RN. Please call thank you.

## 2020-12-22 ENCOUNTER — OFFICE VISIT (OUTPATIENT)
Dept: OTOLARYNGOLOGY | Facility: CLINIC | Age: 40
End: 2020-12-22

## 2020-12-22 VITALS
BODY MASS INDEX: 34.16 KG/M2 | HEART RATE: 118 BPM | SYSTOLIC BLOOD PRESSURE: 146 MMHG | DIASTOLIC BLOOD PRESSURE: 96 MMHG | HEIGHT: 71 IN | WEIGHT: 244 LBS

## 2020-12-22 DIAGNOSIS — J32.9 CHRONIC SINUSITIS, UNSPECIFIED LOCATION: ICD-10-CM

## 2020-12-22 DIAGNOSIS — J34.2 DEVIATED NASAL SEPTUM: Primary | ICD-10-CM

## 2020-12-22 PROCEDURE — 99214 OFFICE O/P EST MOD 30 MIN: CPT | Performed by: OTOLARYNGOLOGY

## 2020-12-22 PROCEDURE — 3080F DIAST BP >= 90 MM HG: CPT | Performed by: OTOLARYNGOLOGY

## 2020-12-22 PROCEDURE — 3008F BODY MASS INDEX DOCD: CPT | Performed by: OTOLARYNGOLOGY

## 2020-12-22 PROCEDURE — 3077F SYST BP >= 140 MM HG: CPT | Performed by: OTOLARYNGOLOGY

## 2021-01-02 ENCOUNTER — PATIENT MESSAGE (OUTPATIENT)
Dept: FAMILY MEDICINE CLINIC | Facility: CLINIC | Age: 41
End: 2021-01-02

## 2021-01-02 DIAGNOSIS — R07.0 THROAT DISCOMFORT: Primary | ICD-10-CM

## 2021-01-03 NOTE — TELEPHONE ENCOUNTER
From: Carol Roblero  To: Jermain Cleaning DO  Sent: 1/2/2021 2:31 PM CST  Subject: Referral Request    Dr. Cornelio Leyva,    Can you please set up a referral for me to see an allergist? The ENT didn't go as anything that explains the on-and-off scratchy throat.

## 2021-01-12 NOTE — PROGRESS NOTES
Penelope Cuello is a 41year old male. Patient presents with:  Results: CT sinus done on 12/11/2020      HISTORY OF PRESENT ILLNESS  He presents with a history of worsening facial pressure discomfort nasal congestion.   He has been on Kabooza and Former User    Substance and Sexual Activity      Alcohol use: Yes        Frequency: 2-3 times a week        Comment: Occasional beer      Drug use: Yes        Types: Cannabis    Other Topics      Concerns:        Caffeine Concern: Yes          coffee Normal, Left: Normal. Pupil - Right: Normal, Left: Normal. Fundus - Right: Normal, Left: Normal.   Neurological Normal Memory - Normal. Cranial nerves - Cranial nerves II through XII grossly intact.    Head/Face Normal Facial features - Normal. Eyebrows - N Take one half tab daily (Patient not taking: Reported on 10/3/2020 ), Disp: 90 tablet, Rfl: 0  •  diazepam (VALIUM) 10 MG Oral Tab, Take 1 tablet (10 mg total) by mouth See Admin Instructions. Take 1-2 tablets by mouth 20-30 minutes before procedure.  (Susanne

## 2021-02-02 RX ORDER — LISINOPRIL AND HYDROCHLOROTHIAZIDE 12.5; 1 MG/1; MG/1
1 TABLET ORAL DAILY
Qty: 90 TABLET | Refills: 0 | Status: SHIPPED | OUTPATIENT
Start: 2021-02-02 | End: 2021-04-23

## 2021-02-08 ENCOUNTER — LAB ENCOUNTER (OUTPATIENT)
Dept: LAB | Age: 41
End: 2021-02-08
Attending: FAMILY MEDICINE
Payer: COMMERCIAL

## 2021-02-08 ENCOUNTER — OFFICE VISIT (OUTPATIENT)
Dept: FAMILY MEDICINE CLINIC | Facility: CLINIC | Age: 41
End: 2021-02-08

## 2021-02-08 VITALS
WEIGHT: 244 LBS | HEART RATE: 103 BPM | RESPIRATION RATE: 16 BRPM | BODY MASS INDEX: 33.41 KG/M2 | SYSTOLIC BLOOD PRESSURE: 128 MMHG | TEMPERATURE: 98 F | DIASTOLIC BLOOD PRESSURE: 84 MMHG | HEIGHT: 71.46 IN

## 2021-02-08 DIAGNOSIS — R10.9 ABDOMINAL DISCOMFORT: ICD-10-CM

## 2021-02-08 DIAGNOSIS — F41.9 ANXIETY: ICD-10-CM

## 2021-02-08 DIAGNOSIS — Z00.00 ROUTINE PHYSICAL EXAMINATION: Primary | ICD-10-CM

## 2021-02-08 DIAGNOSIS — I10 ESSENTIAL HYPERTENSION: ICD-10-CM

## 2021-02-08 DIAGNOSIS — Z00.00 ROUTINE PHYSICAL EXAMINATION: ICD-10-CM

## 2021-02-08 LAB
ALBUMIN SERPL-MCNC: 4.6 G/DL (ref 3.4–5)
ALBUMIN/GLOB SERPL: 1.3 {RATIO} (ref 1–2)
ALP LIVER SERPL-CCNC: 81 U/L
ALT SERPL-CCNC: 60 U/L
ANION GAP SERPL CALC-SCNC: 4 MMOL/L (ref 0–18)
AST SERPL-CCNC: 19 U/L (ref 15–37)
BILIRUB SERPL-MCNC: 0.8 MG/DL (ref 0.1–2)
BUN BLD-MCNC: 13 MG/DL (ref 7–18)
BUN/CREAT SERPL: 13.1 (ref 10–20)
CALCIUM BLD-MCNC: 10.2 MG/DL (ref 8.5–10.1)
CHLORIDE SERPL-SCNC: 106 MMOL/L (ref 98–112)
CHOLEST SMN-MCNC: 200 MG/DL (ref ?–200)
CO2 SERPL-SCNC: 27 MMOL/L (ref 21–32)
CREAT BLD-MCNC: 0.99 MG/DL
DEPRECATED RDW RBC AUTO: 37.4 FL (ref 35.1–46.3)
ERYTHROCYTE [DISTWIDTH] IN BLOOD BY AUTOMATED COUNT: 12.1 % (ref 11–15)
EST. AVERAGE GLUCOSE BLD GHB EST-MCNC: 94 MG/DL (ref 68–126)
GLOBULIN PLAS-MCNC: 3.6 G/DL (ref 2.8–4.4)
GLUCOSE BLD-MCNC: 93 MG/DL (ref 70–99)
HBA1C MFR BLD HPLC: 4.9 % (ref ?–5.7)
HCT VFR BLD AUTO: 48.2 %
HDLC SERPL-MCNC: 42 MG/DL (ref 40–59)
HGB BLD-MCNC: 16.4 G/DL
LDLC SERPL CALC-MCNC: 127 MG/DL (ref ?–100)
M PROTEIN MFR SERPL ELPH: 8.2 G/DL (ref 6.4–8.2)
MCH RBC QN AUTO: 29.1 PG (ref 26–34)
MCHC RBC AUTO-ENTMCNC: 34 G/DL (ref 31–37)
MCV RBC AUTO: 85.5 FL
NONHDLC SERPL-MCNC: 158 MG/DL (ref ?–130)
OSMOLALITY SERPL CALC.SUM OF ELEC: 284 MOSM/KG (ref 275–295)
PATIENT FASTING Y/N/NP: YES
PATIENT FASTING Y/N/NP: YES
PLATELET # BLD AUTO: 420 10(3)UL (ref 150–450)
POTASSIUM SERPL-SCNC: 4.1 MMOL/L (ref 3.5–5.1)
RBC # BLD AUTO: 5.64 X10(6)UL
SODIUM SERPL-SCNC: 137 MMOL/L (ref 136–145)
TRIGL SERPL-MCNC: 153 MG/DL (ref 30–149)
TSI SER-ACNC: 0.49 MIU/ML (ref 0.36–3.74)
VLDLC SERPL CALC-MCNC: 31 MG/DL (ref 0–30)
WBC # BLD AUTO: 8.3 X10(3) UL (ref 4–11)

## 2021-02-08 PROCEDURE — 83036 HEMOGLOBIN GLYCOSYLATED A1C: CPT

## 2021-02-08 PROCEDURE — 80061 LIPID PANEL: CPT

## 2021-02-08 PROCEDURE — 85027 COMPLETE CBC AUTOMATED: CPT

## 2021-02-08 PROCEDURE — 3074F SYST BP LT 130 MM HG: CPT | Performed by: FAMILY MEDICINE

## 2021-02-08 PROCEDURE — 99396 PREV VISIT EST AGE 40-64: CPT | Performed by: FAMILY MEDICINE

## 2021-02-08 PROCEDURE — 3079F DIAST BP 80-89 MM HG: CPT | Performed by: FAMILY MEDICINE

## 2021-02-08 PROCEDURE — 36415 COLL VENOUS BLD VENIPUNCTURE: CPT

## 2021-02-08 PROCEDURE — 84443 ASSAY THYROID STIM HORMONE: CPT

## 2021-02-08 PROCEDURE — 3008F BODY MASS INDEX DOCD: CPT | Performed by: FAMILY MEDICINE

## 2021-02-08 PROCEDURE — 80053 COMPREHEN METABOLIC PANEL: CPT

## 2021-02-08 RX ORDER — MOMETASONE FUROATE 1 MG/G
1 CREAM TOPICAL 2 TIMES DAILY PRN
Qty: 50 G | Refills: 1 | Status: SHIPPED | OUTPATIENT
Start: 2021-02-08 | End: 2021-04-06

## 2021-02-08 NOTE — PROGRESS NOTES
HPI: Delvin Mishra is a 41year old male who presents for physical.  with 2 girls. Lost 25 pounds this year! Diet is improved. Exercise is a struggle. Following with Psychologist for anxiety which helps. Anxiety is under control.  Improved with politica polyphagia  ENMT:  Negative for ear drainage, hearing loss and nasal drainage; chronic thorat discomfort  Eyes:  Negative for eye discharge and vision loss  Gastrointestinal:  Positive for abdominal discomfort, constipation, decreased appetite, diarrhea an

## 2021-02-16 ENCOUNTER — OFFICE VISIT (OUTPATIENT)
Dept: ALLERGY | Facility: CLINIC | Age: 41
End: 2021-02-16

## 2021-02-16 ENCOUNTER — LAB ENCOUNTER (OUTPATIENT)
Dept: LAB | Age: 41
End: 2021-02-16
Attending: ALLERGY & IMMUNOLOGY
Payer: COMMERCIAL

## 2021-02-16 ENCOUNTER — NURSE ONLY (OUTPATIENT)
Dept: ALLERGY | Facility: CLINIC | Age: 41
End: 2021-02-16

## 2021-02-16 VITALS
HEIGHT: 71 IN | WEIGHT: 246 LBS | RESPIRATION RATE: 20 BRPM | TEMPERATURE: 98 F | HEART RATE: 115 BPM | DIASTOLIC BLOOD PRESSURE: 82 MMHG | OXYGEN SATURATION: 97 % | SYSTOLIC BLOOD PRESSURE: 135 MMHG | BODY MASS INDEX: 34.44 KG/M2

## 2021-02-16 DIAGNOSIS — J45.20 MILD INTERMITTENT EXTRINSIC ASTHMA WITHOUT COMPLICATION: ICD-10-CM

## 2021-02-16 DIAGNOSIS — Z91.018 FOOD ALLERGY: ICD-10-CM

## 2021-02-16 DIAGNOSIS — J30.1 SEASONAL ALLERGIC RHINITIS DUE TO POLLEN: Primary | ICD-10-CM

## 2021-02-16 DIAGNOSIS — J30.89 ENVIRONMENTAL AND SEASONAL ALLERGIES: ICD-10-CM

## 2021-02-16 DIAGNOSIS — J34.2 NASAL SEPTAL DEVIATION: ICD-10-CM

## 2021-02-16 PROCEDURE — 99244 OFF/OP CNSLTJ NEW/EST MOD 40: CPT | Performed by: ALLERGY & IMMUNOLOGY

## 2021-02-16 PROCEDURE — 95024 IQ TESTS W/ALLERGENIC XTRCS: CPT | Performed by: ALLERGY & IMMUNOLOGY

## 2021-02-16 PROCEDURE — 3079F DIAST BP 80-89 MM HG: CPT | Performed by: ALLERGY & IMMUNOLOGY

## 2021-02-16 PROCEDURE — 86003 ALLG SPEC IGE CRUDE XTRC EA: CPT

## 2021-02-16 PROCEDURE — 3008F BODY MASS INDEX DOCD: CPT | Performed by: ALLERGY & IMMUNOLOGY

## 2021-02-16 PROCEDURE — 3075F SYST BP GE 130 - 139MM HG: CPT | Performed by: ALLERGY & IMMUNOLOGY

## 2021-02-16 PROCEDURE — 95004 PERQ TESTS W/ALRGNC XTRCS: CPT | Performed by: ALLERGY & IMMUNOLOGY

## 2021-02-16 PROCEDURE — 36415 COLL VENOUS BLD VENIPUNCTURE: CPT

## 2021-02-16 RX ORDER — FAMOTIDINE 20 MG/1
20 TABLET ORAL 2 TIMES DAILY
COMMUNITY
End: 2021-04-06

## 2021-02-16 RX ORDER — AZELASTINE 1 MG/ML
2 SPRAY, METERED NASAL 2 TIMES DAILY
Qty: 1 BOTTLE | Refills: 0 | Status: SHIPPED | OUTPATIENT
Start: 2021-02-16 | End: 2021-04-06

## 2021-02-16 RX ORDER — LEVOCETIRIZINE DIHYDROCHLORIDE 5 MG/1
5 TABLET, FILM COATED ORAL NIGHTLY
Qty: 30 TABLET | Refills: 0 | Status: SHIPPED | OUTPATIENT
Start: 2021-02-16 | End: 2021-03-11

## 2021-02-16 NOTE — PROGRESS NOTES
Ernesto Jiang is a 41year old male. HPI:   Patient presents with:  Food Allergy: Consuls. Pateint informed as a child that he was told he was allergic to shellfish and would like to be tested.   Patient states that he broke out in hives, recently, a leftward nasal septal deviation with a large 7 mm leftward directed septal spur that partially contacts the left inferior turbinate. Right poncho bullosa. 4. Lesser incidental findings as above.         Remote - PS     Dictated by (CST): ADÁN Muhammad (MULTI-VITAMIN/MINERALS) Oral Tab Take 1 tablet by mouth daily.          Allergies:  No Known Allergies      ROS:     Allergic/Immuno:  See HPI  Cardiovascular:  Negative for irregular heartbeat/palpitations, chest pain, edema  Constitutional:  Negative nig complication    Skin testing today to shellfish including shrimp crab lobster his oysters clams and scallops was positive to crab and negative for remaining shellfish    Reviewed with patient that I do not have skin testing to calamari /cuttle fish/squid administration of these medications. Teaching, instruction and sample was provided to the patient by myself. Teaching included  a review of potential adverse side effects as well as potential efficacy.   Patient's questions were answered in regards to med

## 2021-02-16 NOTE — PATIENT INSTRUCTIONS
1. Ar  Handouts on allergies and avoidance measures provided and reviewed including the potential treatment option of immunotherapy  Start Xyzal, levocetirizine 5 mg once a night at bedtime as a nonsedating antihistamine  Trial of Astelin 2 sprays per nost

## 2021-02-19 LAB — ALLERGEN, SQUID IGE: <0.1 KU/L

## 2021-03-12 RX ORDER — LEVOCETIRIZINE DIHYDROCHLORIDE 5 MG/1
5 TABLET, FILM COATED ORAL NIGHTLY
Qty: 30 TABLET | Refills: 0 | Status: SHIPPED | OUTPATIENT
Start: 2021-03-12 | End: 2021-04-26

## 2021-03-12 NOTE — TELEPHONE ENCOUNTER
Refill requested for:   Levocetirizine Dihydrochloride (XYZAL) 5 MG Oral Tab         Sig: Take 1 tablet (5 mg total) by mouth nightly.     Disp:  30 tablet    Refills:  0    Start: 3/11/2021    Class: Normal    Non-formulary    Last ordered: 3 weeks ago by

## 2021-03-15 NOTE — TELEPHONE ENCOUNTER
RN called patient to notify him of RX refill and need for follow up. Patient reports he will schedule a follow up at a different time.

## 2021-03-26 ENCOUNTER — TELEPHONE (OUTPATIENT)
Dept: FAMILY MEDICINE CLINIC | Facility: CLINIC | Age: 41
End: 2021-03-26

## 2021-03-26 NOTE — TELEPHONE ENCOUNTER
Pt informed of Dr Silver Fall response below. Pt states after calling realized qualifies for the COVID vaccine based on his HTN; states has an appt next week to get the vaccine.  Advised to inform the office after he receives it so it can be noted in his chart

## 2021-03-26 NOTE — TELEPHONE ENCOUNTER
Patient wants to know if he qualifies for \"Covid Vaccine phase 1B plus\" with his diagnosis \"anticipatory anxiety\". He is aware you are out of office until 3/29/21.       Please advise-

## 2021-04-03 ENCOUNTER — NURSE TRIAGE (OUTPATIENT)
Dept: FAMILY MEDICINE CLINIC | Facility: CLINIC | Age: 41
End: 2021-04-03

## 2021-04-03 ENCOUNTER — HOSPITAL ENCOUNTER (OUTPATIENT)
Age: 41
Discharge: HOME | End: 2021-04-03
Payer: COMMERCIAL

## 2021-04-03 VITALS
BODY MASS INDEX: 34.3 KG/M2 | HEART RATE: 95 BPM | TEMPERATURE: 98 F | OXYGEN SATURATION: 98 % | RESPIRATION RATE: 16 BRPM | SYSTOLIC BLOOD PRESSURE: 146 MMHG | WEIGHT: 245 LBS | DIASTOLIC BLOOD PRESSURE: 79 MMHG | HEIGHT: 71 IN

## 2021-04-03 DIAGNOSIS — M54.2 NECK PAIN, MUSCULOSKELETAL: Primary | ICD-10-CM

## 2021-04-03 PROCEDURE — 99213 OFFICE O/P EST LOW 20 MIN: CPT | Performed by: NURSE PRACTITIONER

## 2021-04-03 RX ORDER — CYCLOBENZAPRINE HCL 10 MG
10 TABLET ORAL 2 TIMES DAILY PRN
Qty: 10 TABLET | Refills: 0 | Status: SHIPPED | OUTPATIENT
Start: 2021-04-03 | End: 2021-09-20

## 2021-04-03 NOTE — ED PROVIDER NOTES
Patient Seen in: [facility]      History   Patient presents with:  Back Pain: It’s more my neck and shoulder. I think my trap is pulled.  - Entered by patient    Stated Complaint: Back Pain    HPI/Subjective:   HPI    This is a 24-year-old ma note reviewed. Constitutional:       Appearance: Normal appearance. HENT:      Head: Normocephalic.       Right Ear: Tympanic membrane normal.      Left Ear: Tympanic membrane normal.      Nose: Nose normal.      Mouth/Throat:      Mouth: Mucous membran Disposition and Plan     Clinical Impression:  Neck pain, musculoskeletal  (primary encounter diagnosis)    Disposition:  Discharge  4/3/2021  1:38 pm    Follow-up:  Nima Harrington DO  [address] [address]

## 2021-04-03 NOTE — TELEPHONE ENCOUNTER
Action Requested: Summary for Provider     []  Critical Lab, Recommendations Needed  [] Need Additional Advice  []   FYI    []   Need Orders  [] Need Medications Sent to Pharmacy  []  Other     SUMMARY: pt going to IC for eval left shoulder pain.     Pt sta

## 2021-04-06 ENCOUNTER — OFFICE VISIT (OUTPATIENT)
Dept: FAMILY MEDICINE CLINIC | Facility: CLINIC | Age: 41
End: 2021-04-06

## 2021-04-06 ENCOUNTER — HOSPITAL ENCOUNTER (OUTPATIENT)
Dept: GENERAL RADIOLOGY | Age: 41
Discharge: HOME | End: 2021-04-06
Attending: FAMILY MEDICINE
Payer: COMMERCIAL

## 2021-04-06 VITALS
RESPIRATION RATE: 16 BRPM | DIASTOLIC BLOOD PRESSURE: 80 MMHG | SYSTOLIC BLOOD PRESSURE: 132 MMHG | BODY MASS INDEX: 35 KG/M2 | HEART RATE: 97 BPM | WEIGHT: 249 LBS | TEMPERATURE: 98 F

## 2021-04-06 DIAGNOSIS — M54.2 NECK PAIN: Primary | ICD-10-CM

## 2021-04-06 DIAGNOSIS — M54.2 NECK PAIN: ICD-10-CM

## 2021-04-06 PROCEDURE — 3079F DIAST BP 80-89 MM HG: CPT | Performed by: FAMILY MEDICINE

## 2021-04-06 PROCEDURE — 72040 X-RAY EXAM NECK SPINE 2-3 VW: CPT | Performed by: FAMILY MEDICINE

## 2021-04-06 PROCEDURE — 99213 OFFICE O/P EST LOW 20 MIN: CPT | Performed by: FAMILY MEDICINE

## 2021-04-06 PROCEDURE — 3075F SYST BP GE 130 - 139MM HG: CPT | Performed by: FAMILY MEDICINE

## 2021-04-06 RX ORDER — MELOXICAM 15 MG/1
15 TABLET ORAL DAILY
Qty: 7 TABLET | Refills: 0 | Status: SHIPPED | OUTPATIENT
Start: 2021-04-06 | End: 2021-04-13

## 2021-04-06 NOTE — PROGRESS NOTES
HPI: Susanna Gottron is a 41year old male who presents for follow-up immediate care. Went to [facility] on 4/3 for neck pain. Woke up last Thursday or Friday with pinch in his neck. Lingered so he went to [facility]. Started on muscle relaxer.  Got massage

## 2021-04-08 ENCOUNTER — PATIENT MESSAGE (OUTPATIENT)
Dept: FAMILY MEDICINE CLINIC | Facility: CLINIC | Age: 41
End: 2021-04-08

## 2021-04-08 DIAGNOSIS — M54.2 NECK PAIN: Primary | ICD-10-CM

## 2021-04-08 NOTE — TELEPHONE ENCOUNTER
From: Penelope Cuello  To: Ananya Gil DO  Sent: 4/8/2021 12:09 PM CDT  Subject: Visit Follow-up Question    Dr. Dami Peña,     The pain in my neck has ebbed a bit and is now more of an uncomfortable feeling and less painful which is nice.  That said, it
Message routed to provider for review.
none

## 2021-04-19 ENCOUNTER — NURSE TRIAGE (OUTPATIENT)
Dept: FAMILY MEDICINE CLINIC | Facility: CLINIC | Age: 41
End: 2021-04-19

## 2021-04-19 NOTE — TELEPHONE ENCOUNTER
Reason for Disposition  • COVID-19 vaccine, Frequently Asked Questions (FAQs)    Protocols used: CORONAVIRUS (COVID-19) VACCINE QUESTIONS AND [id number]    Action Requested: Summary for Provider     []  Critical Lab, Recommendations Needed  [] Need A

## 2021-04-22 ENCOUNTER — PATIENT MESSAGE (OUTPATIENT)
Dept: FAMILY MEDICINE CLINIC | Facility: CLINIC | Age: 41
End: 2021-04-22

## 2021-04-22 DIAGNOSIS — J45.20 MILD INTERMITTENT ASTHMA WITHOUT COMPLICATION: ICD-10-CM

## 2021-04-22 DIAGNOSIS — R07.0 THROAT DISCOMFORT: Primary | ICD-10-CM

## 2021-04-23 ENCOUNTER — PATIENT MESSAGE (OUTPATIENT)
Dept: ALLERGY | Facility: CLINIC | Age: 41
End: 2021-04-23

## 2021-04-23 RX ORDER — AMLODIPINE BESYLATE 10 MG/1
10 TABLET ORAL
Qty: 90 TABLET | Refills: 1 | Status: SHIPPED | OUTPATIENT
Start: 2021-04-23 | End: 2021-08-04

## 2021-04-23 RX ORDER — LISINOPRIL AND HYDROCHLOROTHIAZIDE 12.5; 1 MG/1; MG/1
1 TABLET ORAL DAILY
Qty: 90 TABLET | Refills: 0 | Status: SHIPPED | OUTPATIENT
Start: 2021-04-23 | End: 2021-06-06

## 2021-04-23 RX ORDER — LEVOCETIRIZINE DIHYDROCHLORIDE 5 MG/1
5 TABLET, FILM COATED ORAL NIGHTLY
Qty: 30 TABLET | Refills: 0 | OUTPATIENT
Start: 2021-04-23

## 2021-04-23 NOTE — TELEPHONE ENCOUNTER
From: Antelmo Washington  To: Noelle Ramsey DO  Sent: 4/22/2021 6:22 PM CDT  Subject: Referral Request    Hello,    I want to start getting allergy shots.  Can you please give me 99 referrals for the allergist?     Thank you,    Haven Marx

## 2021-04-23 NOTE — TELEPHONE ENCOUNTER
Refill requested for:   Levocetirizine Dihydrochloride (XYZAL) 5 MG Oral Tab         Sig: Take 1 tablet (5 mg total) by mouth nightly.     Disp:  30 tablet    Refills:  0    Start: 4/23/2021    Class: Normal    Non-formulary    Last ordered: 1 month ago by

## 2021-04-23 NOTE — TELEPHONE ENCOUNTER
Refill request denied. Patient has follow-up appointment on April 26. Will address refills at that time. Patient last seen in February 2016. Refill was granted on March 12, 2021 advised to follow-up within the month.

## 2021-04-26 ENCOUNTER — OFFICE VISIT (OUTPATIENT)
Dept: ALLERGY | Facility: CLINIC | Age: 41
End: 2021-04-26

## 2021-04-26 VITALS
DIASTOLIC BLOOD PRESSURE: 84 MMHG | HEART RATE: 86 BPM | SYSTOLIC BLOOD PRESSURE: 135 MMHG | BODY MASS INDEX: 34.86 KG/M2 | HEIGHT: 71 IN | OXYGEN SATURATION: 99 % | WEIGHT: 249 LBS

## 2021-04-26 DIAGNOSIS — J30.1 SEASONAL ALLERGIC RHINITIS DUE TO POLLEN: ICD-10-CM

## 2021-04-26 DIAGNOSIS — Z91.018 FOOD ALLERGY: Primary | ICD-10-CM

## 2021-04-26 DIAGNOSIS — T78.1XXD POLLEN-FOOD ALLERGY, SUBSEQUENT ENCOUNTER: ICD-10-CM

## 2021-04-26 PROCEDURE — 99213 OFFICE O/P EST LOW 20 MIN: CPT | Performed by: ALLERGY & IMMUNOLOGY

## 2021-04-26 PROCEDURE — 3008F BODY MASS INDEX DOCD: CPT | Performed by: ALLERGY & IMMUNOLOGY

## 2021-04-26 PROCEDURE — 3079F DIAST BP 80-89 MM HG: CPT | Performed by: ALLERGY & IMMUNOLOGY

## 2021-04-26 PROCEDURE — 3075F SYST BP GE 130 - 139MM HG: CPT | Performed by: ALLERGY & IMMUNOLOGY

## 2021-04-26 RX ORDER — LEVOCETIRIZINE DIHYDROCHLORIDE 5 MG/1
5 TABLET, FILM COATED ORAL NIGHTLY
Qty: 90 TABLET | Refills: 2 | Status: SHIPPED | OUTPATIENT
Start: 2021-04-26 | End: 2021-10-25

## 2021-04-26 NOTE — TELEPHONE ENCOUNTER
From: Sunshine Torres  To: Chacho Reece MD  Sent: 4/23/2021 12:48 PM CDT  Subject: Prescription Question    I have an appt on Monday, why did you deny my refill request? Also, why didn't you provide a reason for the denial?    Manuel Oquendo

## 2021-04-26 NOTE — PROGRESS NOTES
Antonio Mott is a 41year old male. HPI:   Patient presents with:   Allergies: patient presents for follow up for allergies, doing better with Xyzal and Sudafed, would like to discuss allergy shots    Patient is a 43-year-old male who presents for fo 16.1      Smokeless tobacco: Former User    Vaping Use      Vaping Use: Some days    Alcohol use: Yes      Comment: Occasional beer    Drug use: Yes      Types: Cannabis       Medications (Active prior to today's visit):  Current Outpatient Medications   M respiratory effort   Cardiovascular: regular rate and rhythm no murmurs, gallups, or rubs  Abdomen: soft non-tender non-distended  Skin/Hair: no unusual rashes present   Extremities: no edema, cyanosis, or clubbing     ASSESSMENT/PLAN:   Assessment   Food

## 2021-05-22 ENCOUNTER — HOSPITAL ENCOUNTER (OUTPATIENT)
Age: 41
Discharge: HOME | End: 2021-05-22
Payer: COMMERCIAL

## 2021-06-07 RX ORDER — LISINOPRIL AND HYDROCHLOROTHIAZIDE 12.5; 1 MG/1; MG/1
1 TABLET ORAL DAILY
Qty: 90 TABLET | Refills: 0 | Status: SHIPPED | OUTPATIENT
Start: 2021-06-07 | End: 2021-10-25

## 2021-08-05 RX ORDER — AMLODIPINE BESYLATE 10 MG/1
10 TABLET ORAL
Qty: 90 TABLET | Refills: 1 | Status: SHIPPED | OUTPATIENT
Start: 2021-08-05 | End: 2021-10-25

## 2021-08-05 NOTE — TELEPHONE ENCOUNTER
Refill passed per [place] [facility] [place], [facility] protocol. Requested Prescriptions   Pending Prescriptions Disp Refills    amLODIPine besylate 10 MG Oral Tab 90 tablet 1     Sig: Take 1 tablet (10 mg total) by mouth once daily.         Hypertensive Medications Protocol Passed - 8/4/2021 10:23 PM        Passed - CMP or BMP in past 12 months        Passed - Appointment in past 6 or next 3 months        Passed - GFR Non- > 50     Lab Results   Component Value Date    GFRNAA 94 02/08/2021                     Recent Outpatient Visits              3 months ago Food allergy    [facility], [address] Desma Marten, MD    Office Visit    4 months ago Neck pain    Chanda Cronin [place], DO    Office Visit    5 months ago Environmental and seasonal allergies    [facility], [facility], [facility]    Nurse Only    5 months ago Seasonal allergic rhinitis due to pollen    Timmy Warren Desma Marten, MD    Office Visit    5 months ago Routine physical examination    [facility] [place]    Office Visit

## 2021-09-20 ENCOUNTER — OFFICE VISIT (OUTPATIENT)
Dept: FAMILY MEDICINE CLINIC | Facility: CLINIC | Age: 41
End: 2021-09-20

## 2021-09-20 VITALS
HEART RATE: 96 BPM | BODY MASS INDEX: 33 KG/M2 | SYSTOLIC BLOOD PRESSURE: 119 MMHG | TEMPERATURE: 98 F | WEIGHT: 240 LBS | DIASTOLIC BLOOD PRESSURE: 84 MMHG | RESPIRATION RATE: 18 BRPM

## 2021-09-20 DIAGNOSIS — J30.2 SEASONAL ALLERGIES: ICD-10-CM

## 2021-09-20 DIAGNOSIS — R09.81 NASAL CONGESTION: Primary | ICD-10-CM

## 2021-09-20 PROCEDURE — 90686 IIV4 VACC NO PRSV 0.5 ML IM: CPT | Performed by: FAMILY MEDICINE

## 2021-09-20 PROCEDURE — 99213 OFFICE O/P EST LOW 20 MIN: CPT | Performed by: FAMILY MEDICINE

## 2021-09-20 PROCEDURE — 90471 IMMUNIZATION ADMIN: CPT | Performed by: FAMILY MEDICINE

## 2021-09-20 PROCEDURE — 3074F SYST BP LT 130 MM HG: CPT | Performed by: FAMILY MEDICINE

## 2021-09-20 PROCEDURE — 3079F DIAST BP 80-89 MM HG: CPT | Performed by: FAMILY MEDICINE

## 2021-09-20 RX ORDER — MONTELUKAST SODIUM 10 MG/1
10 TABLET ORAL NIGHTLY
Qty: 30 TABLET | Refills: 2 | Status: SHIPPED | OUTPATIENT
Start: 2021-09-20 | End: 2021-10-09

## 2021-09-20 RX ORDER — MULTIVIT-MIN/IRON/FOLIC ACID/K 18-600-40
CAPSULE ORAL
COMMUNITY

## 2021-10-06 ENCOUNTER — TELEPHONE (OUTPATIENT)
Dept: FAMILY MEDICINE CLINIC | Facility: CLINIC | Age: 41
End: 2021-10-06

## 2021-10-06 ENCOUNTER — PATIENT MESSAGE (OUTPATIENT)
Dept: FAMILY MEDICINE CLINIC | Facility: CLINIC | Age: 41
End: 2021-10-06

## 2021-10-06 RX ORDER — AMOXICILLIN AND CLAVULANATE POTASSIUM 875; 125 MG/1; MG/1
1 TABLET, FILM COATED ORAL 2 TIMES DAILY
Qty: 20 TABLET | Refills: 0 | Status: SHIPPED | OUTPATIENT
Start: 2021-10-06 | End: 2021-10-16

## 2021-10-06 NOTE — TELEPHONE ENCOUNTER
The patient called and the call was changed to a condition update. From: Prabha Aggarwal  To: Alisha Sawant DO  Sent: 10/6/2021  9:20 AM CDT  Subject: Sinus Infection?     Dr. Vannesa Garcia,    The Singulair helped for a few days, but I’m still having a lo

## 2021-10-11 ENCOUNTER — OFFICE VISIT (OUTPATIENT)
Dept: ALLERGY | Facility: CLINIC | Age: 41
End: 2021-10-11

## 2021-10-11 VITALS
OXYGEN SATURATION: 98 % | SYSTOLIC BLOOD PRESSURE: 161 MMHG | HEIGHT: 71 IN | HEART RATE: 111 BPM | DIASTOLIC BLOOD PRESSURE: 112 MMHG | BODY MASS INDEX: 33.6 KG/M2 | RESPIRATION RATE: 20 BRPM | WEIGHT: 240 LBS

## 2021-10-11 DIAGNOSIS — J30.1 SEASONAL ALLERGIC RHINITIS DUE TO POLLEN: Primary | ICD-10-CM

## 2021-10-11 DIAGNOSIS — Z91.018 FOOD ALLERGY: ICD-10-CM

## 2021-10-11 DIAGNOSIS — J45.20 MILD INTERMITTENT EXTRINSIC ASTHMA WITHOUT COMPLICATION: ICD-10-CM

## 2021-10-11 PROCEDURE — 3080F DIAST BP >= 90 MM HG: CPT | Performed by: ALLERGY & IMMUNOLOGY

## 2021-10-11 PROCEDURE — 3008F BODY MASS INDEX DOCD: CPT | Performed by: ALLERGY & IMMUNOLOGY

## 2021-10-11 PROCEDURE — 99214 OFFICE O/P EST MOD 30 MIN: CPT | Performed by: ALLERGY & IMMUNOLOGY

## 2021-10-11 PROCEDURE — 3077F SYST BP >= 140 MM HG: CPT | Performed by: ALLERGY & IMMUNOLOGY

## 2021-10-11 RX ORDER — FLUTICASONE PROPIONATE 50 MCG
2 SPRAY, SUSPENSION (ML) NASAL DAILY
Qty: 3 EACH | Refills: 0 | Status: SHIPPED | OUTPATIENT
Start: 2021-10-11

## 2021-10-11 RX ORDER — MONTELUKAST SODIUM 10 MG/1
10 TABLET ORAL NIGHTLY
Qty: 30 TABLET | Refills: 2 | Status: SHIPPED | OUTPATIENT
Start: 2021-10-11 | End: 2021-11-19

## 2021-10-11 NOTE — PROGRESS NOTES
Leonel Davalos is a 41year old male. HPI:   Patient presents with: Allergies: Pt presents for worsening allergies and possible sinus infection. PCP rx anx which helped symtoms.          Patient is a 25-year-old male who presents for follow-up with a Cigarettes        Quit date: 3/20/2005        Years since quittin.5      Smokeless tobacco: Former User    Vaping Use      Vaping Use: Some days    Alcohol use: Yes      Comment: Occasional beer    Drug use: Yes      Types: Cannabis       Medications adenopathy  Lymphatic: no abnormal cervical, supraclavicular or axillary adenopathy is noted  Respiratory: normal to inspection lungs are clear to auscultation bilaterally normal respiratory effort   Cardiovascular: regular rate and rhythm no murmurs, gall administration of these medications. Teaching, instruction and sample was provided to the patient by myself. Teaching included  a review of potential adverse side effects as well as potential efficacy.   Patient's questions were answered in regards to med

## 2021-10-11 NOTE — PATIENT INSTRUCTIONS
1. AR  Active symptoms over the past few weeks. Treated with antibiotics for a sinus infection last week with some improvement  No current fevers or mucopurulence  Start Flonase 2 sprays per nostril once a day.   Continue with Singulair and Xyzal.  Add Bren

## 2021-10-22 ENCOUNTER — HOSPITAL ENCOUNTER (OUTPATIENT)
Age: 41
Discharge: HOME | End: 2021-10-22
Payer: COMMERCIAL

## 2021-10-22 VITALS
RESPIRATION RATE: 16 BRPM | HEART RATE: 93 BPM | DIASTOLIC BLOOD PRESSURE: 96 MMHG | TEMPERATURE: 99 F | SYSTOLIC BLOOD PRESSURE: 144 MMHG | OXYGEN SATURATION: 100 %

## 2021-10-22 DIAGNOSIS — J02.0 STREPTOCOCCAL SORE THROAT: ICD-10-CM

## 2021-10-22 DIAGNOSIS — R09.81 NASAL CONGESTION: Primary | ICD-10-CM

## 2021-10-22 PROCEDURE — 99213 OFFICE O/P EST LOW 20 MIN: CPT | Performed by: NURSE PRACTITIONER

## 2021-10-22 PROCEDURE — 87880 STREP A ASSAY W/OPTIC: CPT | Performed by: NURSE PRACTITIONER

## 2021-10-22 PROCEDURE — U0002 COVID-19 LAB TEST NON-CDC: HCPCS | Performed by: NURSE PRACTITIONER

## 2021-10-22 RX ORDER — AMOXICILLIN 500 MG/1
500 TABLET, FILM COATED ORAL 2 TIMES DAILY
Qty: 20 TABLET | Refills: 0 | Status: SHIPPED | OUTPATIENT
Start: 2021-10-22 | End: 2021-11-01

## 2021-10-22 NOTE — ED INITIAL ASSESSMENT (HPI)
Pt states he started with runny nose and sinus complaints 2 weeks ago. Pt's PCP gave him a 10 day course of antibiotics which he completed 6 days ago. Pt state new sinus complaints started 2 days ago.

## 2021-10-22 NOTE — ED PROVIDER NOTES
atient Seen in: [facility]    History   Patient presents with:  Sinus Problem    Stated Complaint: Sinus problem    HPI    Chandu Galvin is a 41year old male who presents to immediate care requesting COVID-19 test.  Pt reports he was on a Cancer Paternal Grandmother    • Heart Disease Maternal Grandfather          age 62s       Social History    Tobacco Use      Smoking status: Former Smoker        Packs/day: 1.00        Years: 6.00        Pack years: 6        Types: Cigarettes        Q peritoneal signs. Genitourinary: Not Examined. Neurological: Moves all 4 extremities. No facial asymmetry. Lymphatic: No gross lymphadenopathy. Musculoskeletal: Good muscle tone. No gross deformity. Skin: Warm, pink and dry. Normal turgor. No rash.

## 2021-10-26 RX ORDER — AMLODIPINE BESYLATE 10 MG/1
10 TABLET ORAL
Qty: 90 TABLET | Refills: 1 | Status: SHIPPED | OUTPATIENT
Start: 2021-10-26 | End: 2022-04-28

## 2021-10-26 RX ORDER — LISINOPRIL AND HYDROCHLOROTHIAZIDE 12.5; 1 MG/1; MG/1
1 TABLET ORAL DAILY
Qty: 90 TABLET | Refills: 1 | Status: SHIPPED | OUTPATIENT
Start: 2021-10-26 | End: 2022-04-28

## 2021-10-26 RX ORDER — LEVOCETIRIZINE DIHYDROCHLORIDE 5 MG/1
5 TABLET, FILM COATED ORAL NIGHTLY
Qty: 90 TABLET | Refills: 2 | Status: SHIPPED | OUTPATIENT
Start: 2021-10-26 | End: 2022-01-18

## 2021-10-26 NOTE — TELEPHONE ENCOUNTER
Refill passed per [address] Sh[facility] Ortiz protocol. Requested Prescriptions   Pending Prescriptions Disp Refills    iisinopril-hydroCHLOROthiazide 10-12.5 MG Oral Tab 90 tablet 1     Sig: Take 1 tablet by mouth daily.         Hypertensive Medications Protocol Passed - 10/26/2021  8:52 AM        Passed - CMP or BMP in past 12 months        Passed - Appointment in past 6 or next 3 months        Passed - GFR Non- > 50     Lab Results   Component Value Date    GFRNAA 94 02/08/2021                         Recent Outpatient Visits              2 weeks ago Seasonal allergic rhinitis due to pollen    Timmy Barber Rosalie Cannon, MD    Office Visit    1 month ago Nasal congestion    Chanda Moran [place]    Office Visit    6 months ago [facility] [place], [place]    Office Visit    6 months ago Neck pain    Chanda Moran [place], DO    Office Visit    8 months ago Environmental and seasonal allergies    [address] OSEI [facility]    Nurse Only

## 2021-10-26 NOTE — TELEPHONE ENCOUNTER
Refill passed per [employer], [facility] protocol. Requested Prescriptions   Pending Prescriptions Disp Refills    amLODIPine 10 MG Oral Tab 90 tablet 1     Sig: Take 1 tablet (10 mg total) by mouth once daily.         Hypertensive Medications Protocol Passed - 10/25/2021 10:22 PM        Passed - CMP or BMP in past 12 months        Passed - Appointment in past 6 or next 3 months        Passed - GFR Non- > 50     Lab Results   Component Value Date    GFRNAA 94 02/08/2021                      Recent Outpatient Visits              2 weeks ago Seasonal allergic rhinitis due to pollen    Timmy Vargas Fawn Misty, MD    Office Visit    1 month ago Nasal congestion    [address] Chanda Miller [place]    Office Visit    6 months ago [address] [facility], [address] Fawn Misty, MD    Office Visit    6 months ago Neck pain    [address] Chanda Miller [place], DO    Office Visit    8 months ago Environmental and seasonal allergies    [facility], [address]    Nurse Only

## 2021-10-26 NOTE — TELEPHONE ENCOUNTER
Patient last seen in Allergy 10/11/2021 for . Lakshmi Rodrigues Seasonal allergic rhinitis due to pollen  (primary encounter diagnosis)  Food allergy  Mild intermittent extrinsic asthma without complication    Refill request received for .  . .    Levocetirizine Dihydr

## 2021-11-02 ENCOUNTER — TELEPHONE (OUTPATIENT)
Dept: FAMILY MEDICINE CLINIC | Facility: CLINIC | Age: 41
End: 2021-11-02

## 2021-11-02 NOTE — TELEPHONE ENCOUNTER
Patient following up for recommendations for ongoing allergy symptoms. Has been with PND, dry phlegm stuck in the throat that makes him gag, congestion feels to be stuck instead of coming out.  Is currently on Singulair, Xyzal, Flonase and as needed will ta

## 2021-11-03 RX ORDER — METHYLPREDNISOLONE 4 MG/1
TABLET ORAL
Qty: 1 EACH | Refills: 0 | Status: SHIPPED | OUTPATIENT
Start: 2021-11-03 | End: 2022-01-26 | Stop reason: ALTCHOICE

## 2021-11-03 NOTE — TELEPHONE ENCOUNTER
Reviewed Dr. Anton Ortiz note which stated he would like to try a short course of prednisone if the symptoms do not improve. I have sent in a Medrol Dosepak. Please have them take this and then follow-up with Dr. Barry Donahue.

## 2021-11-03 NOTE — TELEPHONE ENCOUNTER
Informed patient of advice/medicaiton per Dr. Reny Slade. Patient wanting to extend his thanks to Dr. Reny Slade.

## 2021-11-20 RX ORDER — MONTELUKAST SODIUM 10 MG/1
10 TABLET ORAL NIGHTLY
Qty: 30 TABLET | Refills: 2 | Status: SHIPPED | OUTPATIENT
Start: 2021-11-20

## 2022-01-18 RX ORDER — LEVOCETIRIZINE DIHYDROCHLORIDE 5 MG/1
5 TABLET, FILM COATED ORAL NIGHTLY
Qty: 90 TABLET | Refills: 2 | Status: SHIPPED | OUTPATIENT
Start: 2022-01-18

## 2022-01-18 NOTE — TELEPHONE ENCOUNTER
Patient last seen in Allergy 10/11/2021 for . . .    Seasonal allergic rhinitis due to pollen  (primary encounter diagnosis)  Food allergy  Mild intermittent extrinsic asthma without complication    Refill request received for .  . .    levocetirizine (XYZA

## 2022-02-21 RX ORDER — MONTELUKAST SODIUM 10 MG/1
10 TABLET ORAL NIGHTLY
Qty: 90 TABLET | Refills: 1 | Status: SHIPPED | OUTPATIENT
Start: 2022-02-21 | End: 2022-08-25

## 2022-02-21 NOTE — TELEPHONE ENCOUNTER
Refill passed per [facility] [facility] protocol. Requested Prescriptions   Pending Prescriptions Disp Refills    montelukast 10 MG Oral Tab 30 tablet 2     Sig: Take 1 tablet (10 mg total) by mouth nightly.         Asthma & COPD Medication Protocol Passed - 2/21/2022  9:28 AM        Passed - Appointment in past 6 or next 3 months                  Recent Outpatient Visits              4 months ago Seasonal allergic rhinitis due to pollen    [facility] [address] Laverne Pleasant, MD    Office Visit    5 months ago Nasal congestion    Chanda Almanza [place]    Office Visit    10 months ago [address] [facility], [facility] Tim[address] Cooper Laverne Pleasant, MD    Office Visit    10 months ago Neck pain    Chanda Almanza [place], DO    Office Visit    1 year ago Environmental and seasonal allergies    [facility] [address]    Nurse Only

## 2022-03-10 ENCOUNTER — HOSPITAL ENCOUNTER (OUTPATIENT)
Age: 42
Discharge: HOME | End: 2022-03-10
Payer: COMMERCIAL

## 2022-03-10 DIAGNOSIS — Z20.822 ENCOUNTER FOR LABORATORY TESTING FOR COVID-19 VIRUS: Primary | ICD-10-CM

## 2022-03-11 LAB — SARS-COV-2 RNA RESP QL NAA+PROBE: NOT DETECTED

## 2022-03-12 ENCOUNTER — NURSE TRIAGE (OUTPATIENT)
Dept: FAMILY MEDICINE CLINIC | Facility: CLINIC | Age: 42
End: 2022-03-12

## 2022-03-12 LAB — AMB EXT COVID-19 RESULT: DETECTED

## 2022-03-14 ENCOUNTER — TELEPHONE (OUTPATIENT)
Dept: FAMILY MEDICINE CLINIC | Facility: CLINIC | Age: 42
End: 2022-03-14

## 2022-03-14 ENCOUNTER — TELEMEDICINE (OUTPATIENT)
Dept: FAMILY MEDICINE CLINIC | Facility: CLINIC | Age: 42
End: 2022-03-14

## 2022-03-14 DIAGNOSIS — J02.9 PHARYNGITIS, UNSPECIFIED ETIOLOGY: Primary | ICD-10-CM

## 2022-03-14 DIAGNOSIS — U07.1 COVID-19: ICD-10-CM

## 2022-03-14 PROCEDURE — 99213 OFFICE O/P EST LOW 20 MIN: CPT | Performed by: FAMILY MEDICINE

## 2022-03-14 RX ORDER — METHYLPREDNISOLONE 4 MG/1
TABLET ORAL
Qty: 1 EACH | Refills: 0 | Status: SHIPPED | OUTPATIENT
Start: 2022-03-14

## 2022-03-14 NOTE — TELEPHONE ENCOUNTER
Patient is eager to begin Predinisone 5-day course as discussed during today's video visit. Caller contacted pharmacy and was advised Rx not yet received. Patient is requesting that Rx is sent to pharm and that he is contacted once prescription sent. Patient asks that rx is sent to [facility] listed in chart.     Routing to provider for consideration

## 2022-03-14 NOTE — TELEPHONE ENCOUNTER
Pt + Covid , severe sore throat, hard to rest/sleep, drinking fluids with difficultly,no fever, saline gargle, lozenges, tylenol        Asking for further advise prior to video appt at 1:45

## 2022-04-30 RX ORDER — LISINOPRIL AND HYDROCHLOROTHIAZIDE 12.5; 1 MG/1; MG/1
1 TABLET ORAL DAILY
Qty: 90 TABLET | Refills: 1 | Status: SHIPPED | OUTPATIENT
Start: 2022-04-30

## 2022-04-30 RX ORDER — AMLODIPINE BESYLATE 10 MG/1
10 TABLET ORAL
Qty: 90 TABLET | Refills: 1 | Status: SHIPPED | OUTPATIENT
Start: 2022-04-30

## 2022-05-07 ENCOUNTER — PATIENT MESSAGE (OUTPATIENT)
Dept: FAMILY MEDICINE CLINIC | Facility: CLINIC | Age: 42
End: 2022-05-07

## 2022-05-09 ENCOUNTER — PATIENT MESSAGE (OUTPATIENT)
Dept: FAMILY MEDICINE CLINIC | Facility: CLINIC | Age: 42
End: 2022-05-09

## 2022-05-10 NOTE — TELEPHONE ENCOUNTER
Dr Mary Colon below and advice,thanks       From: Casandra Jensen  To: Ellie Isaac DO  Sent: 5/9/2022  7:53 PM CDT  Subject: Ibuprofen and BP Meds    Dr. Dileep Hallman,    I read this article and wanted to ask if I should stop taking Ibuprofen?     [email or web address]-[email or web address]/     Thank you,    Denise Dueñas

## 2022-06-12 ENCOUNTER — HOSPITAL ENCOUNTER (OUTPATIENT)
Age: 42
Discharge: HOME | End: 2022-06-12
Payer: COMMERCIAL

## 2022-06-12 VITALS
SYSTOLIC BLOOD PRESSURE: 150 MMHG | OXYGEN SATURATION: 100 % | RESPIRATION RATE: 18 BRPM | WEIGHT: 240 LBS | TEMPERATURE: 98 F | BODY MASS INDEX: 33.6 KG/M2 | HEART RATE: 109 BPM | DIASTOLIC BLOOD PRESSURE: 79 MMHG | HEIGHT: 71 IN

## 2022-06-12 DIAGNOSIS — H66.93 BILATERAL OTITIS MEDIA, UNSPECIFIED OTITIS MEDIA TYPE: Primary | ICD-10-CM

## 2022-06-12 RX ORDER — AMOXICILLIN AND CLAVULANATE POTASSIUM 875; 125 MG/1; MG/1
1 TABLET, FILM COATED ORAL 2 TIMES DAILY
Qty: 20 TABLET | Refills: 0 | Status: SHIPPED | OUTPATIENT
Start: 2022-06-12 | End: 2022-06-22

## 2022-07-22 ENCOUNTER — HOSPITAL ENCOUNTER (OUTPATIENT)
Age: 42
Discharge: HOME | End: 2022-07-22
Payer: COMMERCIAL

## 2022-07-22 VITALS
DIASTOLIC BLOOD PRESSURE: 90 MMHG | HEART RATE: 115 BPM | RESPIRATION RATE: 18 BRPM | SYSTOLIC BLOOD PRESSURE: 151 MMHG | TEMPERATURE: 98 F | OXYGEN SATURATION: 99 %

## 2022-07-22 DIAGNOSIS — J30.9 ALLERGIC RHINITIS, UNSPECIFIED SEASONALITY, UNSPECIFIED TRIGGER: ICD-10-CM

## 2022-07-22 DIAGNOSIS — Z20.822 ENCOUNTER FOR LABORATORY TESTING FOR COVID-19 VIRUS: Primary | ICD-10-CM

## 2022-07-22 LAB — SARS-COV-2 RNA RESP QL NAA+PROBE: NOT DETECTED

## 2022-07-22 PROCEDURE — U0002 COVID-19 LAB TEST NON-CDC: HCPCS | Performed by: PHYSICIAN ASSISTANT

## 2022-07-22 PROCEDURE — 99212 OFFICE O/P EST SF 10 MIN: CPT | Performed by: PHYSICIAN ASSISTANT

## 2022-07-22 RX ORDER — CETIRIZINE HYDROCHLORIDE 10 MG/1
10 TABLET ORAL DAILY
COMMUNITY

## 2022-07-22 NOTE — ED INITIAL ASSESSMENT (HPI)
Patient here for evaluation of sinus pain/pressure that over the last couple days has changed. States the last 2 days the mucus has started to thin out, post nasal drip has gotten worse, and has had increased sinus pain/pressure. Last night states he had to use afrin and took sudafed this morning around 5am. Had 1 bought of vomiting earlier this week from the post nasal drip and a sensitive gag reflex. Denies any fevers, chills, or other symptoms.  He did complete a home COVID antigen test last night and this morning which was normal.

## 2022-07-28 RX ORDER — AMLODIPINE BESYLATE 10 MG/1
10 TABLET ORAL
Qty: 90 TABLET | Refills: 1 | Status: SHIPPED | OUTPATIENT
Start: 2022-07-28

## 2022-07-28 RX ORDER — LISINOPRIL AND HYDROCHLOROTHIAZIDE 12.5; 1 MG/1; MG/1
1 TABLET ORAL DAILY
Qty: 90 TABLET | Refills: 1 | Status: SHIPPED | OUTPATIENT
Start: 2022-07-28

## 2022-08-25 RX ORDER — MONTELUKAST SODIUM 10 MG/1
10 TABLET ORAL NIGHTLY
Qty: 90 TABLET | Refills: 1 | Status: SHIPPED | OUTPATIENT
Start: 2022-08-25

## 2022-08-26 NOTE — TELEPHONE ENCOUNTER
Refill passed per [facility] protocol   Requested Prescriptions   Pending Prescriptions Disp Refills    montelukast 10 MG Oral Tab 90 tablet 1     Sig: Take 1 tablet (10 mg total) by mouth nightly.         Asthma & COPD Medication Protocol Passed - 8/25/2022  9:38 PM        Passed - In person appointment or virtual visit in the past 6 mos or appointment in next 3 mos       Recent Outpatient Visits              5 months ago Pharyngitis, unspecified etiology    [facility], [facility], Chanda Hare [place]    Telemedicine    10 months ago Seasonal allergic rhinitis due to pollen    [facility] [address] [place] [address], Adam Escalante MD    Office Visit    11 months ago Nasal congestion    [facility] [place]    Office Visit    1 year ago [facility] [facility], [address] [place], [place] [address]Adam MD    Office Visit    1 year ago Neck pain    [facility], [facility], [facility] [place] [facility] [place] [facility]    Office Visit     Future Appointments         Provider Department Appt Notes    In 3 weeks Eusebio Browne DO [facility], [facility], Lakesha Hare [address] BP lower back pain

## 2022-09-17 ENCOUNTER — PATIENT MESSAGE (OUTPATIENT)
Dept: INTERNAL MEDICINE CLINIC | Facility: CLINIC | Age: 42
End: 2022-09-17

## 2022-09-19 ENCOUNTER — OFFICE VISIT (OUTPATIENT)
Dept: FAMILY MEDICINE CLINIC | Facility: CLINIC | Age: 42
End: 2022-09-19
Payer: COMMERCIAL

## 2022-09-19 ENCOUNTER — LAB ENCOUNTER (OUTPATIENT)
Dept: LAB | Age: 42
End: 2022-09-19
Attending: FAMILY MEDICINE

## 2022-09-19 VITALS
HEART RATE: 105 BPM | HEIGHT: 71.06 IN | RESPIRATION RATE: 16 BRPM | DIASTOLIC BLOOD PRESSURE: 94 MMHG | WEIGHT: 233 LBS | BODY MASS INDEX: 32.62 KG/M2 | TEMPERATURE: 99 F | SYSTOLIC BLOOD PRESSURE: 134 MMHG

## 2022-09-19 DIAGNOSIS — I10 PRIMARY HYPERTENSION: ICD-10-CM

## 2022-09-19 DIAGNOSIS — Z00.00 ROUTINE PHYSICAL EXAMINATION: ICD-10-CM

## 2022-09-19 DIAGNOSIS — Z00.00 ROUTINE PHYSICAL EXAMINATION: Primary | ICD-10-CM

## 2022-09-19 DIAGNOSIS — F41.9 ANXIETY: ICD-10-CM

## 2022-09-19 LAB
ALBUMIN SERPL-MCNC: 4.4 G/DL (ref 3.4–5)
ALBUMIN/GLOB SERPL: 1.3 {RATIO} (ref 1–2)
ALP LIVER SERPL-CCNC: 79 U/L
ALT SERPL-CCNC: 51 U/L
ANION GAP SERPL CALC-SCNC: 9 MMOL/L (ref 0–18)
AST SERPL-CCNC: 22 U/L (ref 15–37)
BILIRUB SERPL-MCNC: 0.6 MG/DL (ref 0.1–2)
BILIRUB UR QL: NEGATIVE
BUN BLD-MCNC: 9 MG/DL (ref 7–18)
BUN/CREAT SERPL: 10.6 (ref 10–20)
CALCIUM BLD-MCNC: 9.9 MG/DL (ref 8.5–10.1)
CHLORIDE SERPL-SCNC: 107 MMOL/L (ref 98–112)
CHOLEST SERPL-MCNC: 191 MG/DL (ref ?–200)
CLARITY UR: CLEAR
CO2 SERPL-SCNC: 24 MMOL/L (ref 21–32)
COLOR UR: YELLOW
CREAT BLD-MCNC: 0.85 MG/DL
DEPRECATED RDW RBC AUTO: 38.9 FL (ref 35.1–46.3)
ERYTHROCYTE [DISTWIDTH] IN BLOOD BY AUTOMATED COUNT: 12.1 % (ref 11–15)
FASTING PATIENT LIPID ANSWER: YES
FASTING STATUS PATIENT QL REPORTED: YES
GFR SERPLBLD BASED ON 1.73 SQ M-ARVRAT: 111 ML/MIN/1.73M2 (ref 60–?)
GLOBULIN PLAS-MCNC: 3.4 G/DL (ref 2.8–4.4)
GLUCOSE BLD-MCNC: 91 MG/DL (ref 70–99)
GLUCOSE UR-MCNC: NEGATIVE MG/DL
HCT VFR BLD AUTO: 46.3 %
HDLC SERPL-MCNC: 40 MG/DL (ref 40–59)
HGB BLD-MCNC: 15.4 G/DL
HGB UR QL STRIP.AUTO: NEGATIVE
KETONES UR-MCNC: NEGATIVE MG/DL
LDLC SERPL CALC-MCNC: 101 MG/DL (ref ?–100)
LEUKOCYTE ESTERASE UR QL STRIP.AUTO: NEGATIVE
MCH RBC QN AUTO: 29.2 PG (ref 26–34)
MCHC RBC AUTO-ENTMCNC: 33.3 G/DL (ref 31–37)
MCV RBC AUTO: 87.7 FL
NITRITE UR QL STRIP.AUTO: NEGATIVE
NONHDLC SERPL-MCNC: 151 MG/DL (ref ?–130)
OSMOLALITY SERPL CALC.SUM OF ELEC: 288 MOSM/KG (ref 275–295)
PH UR: 7 [PH] (ref 5–8)
PLATELET # BLD AUTO: 442 10(3)UL (ref 150–450)
POTASSIUM SERPL-SCNC: 4.1 MMOL/L (ref 3.5–5.1)
PROT SERPL-MCNC: 7.8 G/DL (ref 6.4–8.2)
PROT UR-MCNC: NEGATIVE MG/DL
RBC # BLD AUTO: 5.28 X10(6)UL
SODIUM SERPL-SCNC: 140 MMOL/L (ref 136–145)
SP GR UR STRIP: 1.02 (ref 1–1.03)
TRIGL SERPL-MCNC: 298 MG/DL (ref 30–149)
TSI SER-ACNC: 0.56 MIU/ML (ref 0.36–3.74)
UROBILINOGEN UR STRIP-ACNC: 0.2
VLDLC SERPL CALC-MCNC: 50 MG/DL (ref 0–30)
WBC # BLD AUTO: 10 X10(3) UL (ref 4–11)

## 2022-09-19 PROCEDURE — 80061 LIPID PANEL: CPT

## 2022-09-19 PROCEDURE — 3080F DIAST BP >= 90 MM HG: CPT | Performed by: FAMILY MEDICINE

## 2022-09-19 PROCEDURE — 81003 URINALYSIS AUTO W/O SCOPE: CPT

## 2022-09-19 PROCEDURE — 36415 COLL VENOUS BLD VENIPUNCTURE: CPT

## 2022-09-19 PROCEDURE — 3008F BODY MASS INDEX DOCD: CPT | Performed by: FAMILY MEDICINE

## 2022-09-19 PROCEDURE — 80053 COMPREHEN METABOLIC PANEL: CPT

## 2022-09-19 PROCEDURE — 85027 COMPLETE CBC AUTOMATED: CPT

## 2022-09-19 PROCEDURE — 84443 ASSAY THYROID STIM HORMONE: CPT

## 2022-09-19 PROCEDURE — 3075F SYST BP GE 130 - 139MM HG: CPT | Performed by: FAMILY MEDICINE

## 2022-09-19 PROCEDURE — 99396 PREV VISIT EST AGE 40-64: CPT | Performed by: FAMILY MEDICINE

## 2022-09-19 RX ORDER — AMLODIPINE BESYLATE 10 MG/1
10 TABLET ORAL
Qty: 90 TABLET | Refills: 3 | Status: SHIPPED | OUTPATIENT
Start: 2022-09-19

## 2022-09-19 RX ORDER — LISINOPRIL AND HYDROCHLOROTHIAZIDE 12.5; 1 MG/1; MG/1
1 TABLET ORAL DAILY
Qty: 90 TABLET | Refills: 3 | Status: SHIPPED | OUTPATIENT
Start: 2022-09-19

## 2022-11-08 ENCOUNTER — PATIENT MESSAGE (OUTPATIENT)
Dept: FAMILY MEDICINE CLINIC | Facility: CLINIC | Age: 42
End: 2022-11-08

## 2022-11-09 RX ORDER — MOMETASONE FUROATE 1 MG/G
1 CREAM TOPICAL 2 TIMES DAILY PRN
Qty: 50 G | Refills: 1 | Status: SHIPPED | OUTPATIENT
Start: 2022-11-09

## 2022-11-14 ENCOUNTER — HOSPITAL ENCOUNTER (OUTPATIENT)
Age: 42
Discharge: HOME | End: 2022-11-14
Payer: COMMERCIAL

## 2022-11-14 ENCOUNTER — PATIENT MESSAGE (OUTPATIENT)
Dept: FAMILY MEDICINE CLINIC | Facility: CLINIC | Age: 42
End: 2022-11-14

## 2022-11-14 VITALS
RESPIRATION RATE: 20 BRPM | OXYGEN SATURATION: 100 % | SYSTOLIC BLOOD PRESSURE: 136 MMHG | HEART RATE: 116 BPM | BODY MASS INDEX: 32 KG/M2 | WEIGHT: 228 LBS | TEMPERATURE: 98 F | DIASTOLIC BLOOD PRESSURE: 90 MMHG

## 2022-11-14 DIAGNOSIS — J01.90 ACUTE NON-RECURRENT SINUSITIS, UNSPECIFIED LOCATION: Primary | ICD-10-CM

## 2022-11-14 PROCEDURE — 99213 OFFICE O/P EST LOW 20 MIN: CPT | Performed by: NURSE PRACTITIONER

## 2022-11-14 RX ORDER — AMOXICILLIN AND CLAVULANATE POTASSIUM 875; 125 MG/1; MG/1
1 TABLET, FILM COATED ORAL 2 TIMES DAILY
Qty: 14 TABLET | Refills: 0 | Status: SHIPPED | OUTPATIENT
Start: 2022-11-14 | End: 2022-11-21

## 2022-11-14 NOTE — DISCHARGE INSTRUCTIONS
Take the full course of antibiotics as prescribed, even if you begin to feel better. Make sure to stay well hydrated with clear fluids. You can use Tylenol or Motrin every 6 hours to control fever or discomfort. You can use both Tylenol and Motrin, but alternate them so that you're getting one every 3 hours. Sinus rinses with a netti pot or nasal sprays can be very helpful in clearing your congestion. Using a humidifier in the bedroom at night can also be helpful. Follow up with your primary care provider or the one provided in your discharge instructions in the next 2-3 days. Seek additional care in the ER for new or worsening symptoms or for fever not controlled with Tylenol & Motrin.

## 2022-11-15 NOTE — TELEPHONE ENCOUNTER
fior message sent to pt.        Future Appointments   Date Time Provider Jackelyn Lopez   11/15/2022  4:00 PM Scotty Meyer [facility]

## 2022-11-21 ENCOUNTER — IMMUNIZATION (OUTPATIENT)
Dept: LAB | Age: 42
End: 2022-11-21
Attending: EMERGENCY MEDICINE
Payer: COMMERCIAL

## 2022-11-21 DIAGNOSIS — Z23 NEED FOR VACCINATION: Primary | ICD-10-CM

## 2022-11-21 PROCEDURE — 90471 IMMUNIZATION ADMIN: CPT

## 2022-11-21 PROCEDURE — 90686 IIV4 VACC NO PRSV 0.5 ML IM: CPT

## 2022-11-22 RX ORDER — CETIRIZINE HYDROCHLORIDE 10 MG/1
10 TABLET ORAL DAILY
Qty: 90 TABLET | Refills: 1 | Status: SHIPPED | OUTPATIENT
Start: 2022-11-22

## 2022-12-26 ENCOUNTER — HOSPITAL ENCOUNTER (OUTPATIENT)
Age: 42
Discharge: HOME | End: 2022-12-26
Payer: COMMERCIAL

## 2022-12-26 VITALS
HEART RATE: 98 BPM | RESPIRATION RATE: 20 BRPM | DIASTOLIC BLOOD PRESSURE: 82 MMHG | OXYGEN SATURATION: 100 % | SYSTOLIC BLOOD PRESSURE: 133 MMHG | TEMPERATURE: 98 F

## 2022-12-26 DIAGNOSIS — Z20.822 ENCOUNTER FOR LABORATORY TESTING FOR COVID-19 VIRUS: Primary | ICD-10-CM

## 2022-12-26 DIAGNOSIS — Z20.822 CLOSE EXPOSURE TO COVID-19 VIRUS: ICD-10-CM

## 2022-12-26 LAB — SARS-COV-2 RNA RESP QL NAA+PROBE: NOT DETECTED

## 2022-12-26 PROCEDURE — U0002 COVID-19 LAB TEST NON-CDC: HCPCS | Performed by: NURSE PRACTITIONER

## 2022-12-26 PROCEDURE — 99212 OFFICE O/P EST SF 10 MIN: CPT | Performed by: NURSE PRACTITIONER

## 2022-12-26 NOTE — ED INITIAL ASSESSMENT (HPI)
Pt states wife is covid +, c/o congestion x 1 day, states had a positive covid and a negative at home covid test, wants pcr

## 2023-02-15 RX ORDER — LISINOPRIL AND HYDROCHLOROTHIAZIDE 12.5; 1 MG/1; MG/1
1 TABLET ORAL DAILY
Qty: 90 TABLET | Refills: 3 | OUTPATIENT
Start: 2023-02-15

## 2023-02-15 RX ORDER — AMLODIPINE BESYLATE 10 MG/1
10 TABLET ORAL
Qty: 90 TABLET | Refills: 3 | OUTPATIENT
Start: 2023-02-15

## 2023-03-03 RX ORDER — MONTELUKAST SODIUM 10 MG/1
10 TABLET ORAL NIGHTLY
Qty: 90 TABLET | Refills: 1 | OUTPATIENT
Start: 2023-03-03

## 2023-03-03 RX ORDER — CETIRIZINE HYDROCHLORIDE 10 MG/1
10 TABLET ORAL DAILY
Qty: 90 TABLET | Refills: 1 | OUTPATIENT
Start: 2023-03-03

## 2023-03-06 NOTE — PROGRESS NOTES
Patient contacted provider to schedule an appt over the weekend. Writer returned phone call to schedule and left a voice mail message and contact information.

## 2023-03-08 RX ORDER — MONTELUKAST SODIUM 10 MG/1
10 TABLET ORAL NIGHTLY
Qty: 90 TABLET | Refills: 3 | Status: SHIPPED | OUTPATIENT
Start: 2023-03-08

## 2023-03-09 NOTE — TELEPHONE ENCOUNTER
[See MyChart messages]     Montelukast was sent yesterday, per chart. I spoke with Domingo Eid at [facility] ---> she states Cetirizine is ready for . I will send a message to patient making him aware.  --> see message

## 2023-03-09 NOTE — TELEPHONE ENCOUNTER
Refill passed per [address] protocol.     .  Requested Prescriptions   Pending Prescriptions Disp Refills    MONTELUKAST 10 MG Oral Tab [Pharmacy Med Name: Montelukast Sodium 10 Mg Tab Auro] 90 tablet 3     Sig: TAKE ONE TABLET BY MOUTH AT BEDTIME       Asthma & COPD Medication Protocol Passed - 3/8/2023  8:24 AM        Passed - In person appointment or virtual visit in the past 6 mos or appointment in next 3 mos     Recent Outpatient Visits              5 months ago Routine physical examination    Chanda Romero [place], [place]    Office Visit    11 months ago Pharyngitis, unspecified etiology    Chanda Romero [place], [place]    Telemedicine    1 year ago Seasonal allergic rhinitis due to pollen    Timmy Rich Desma Marten, MD    Office Visit    1 year ago Nasal congestion    Chanda Romero [place], [place]    Office Visit    1 year ago Food allergy    Adrian Darnell MD    Office Visit                           Recent Outpatient Visits              5 months ago Routine physical examination    Chanda Romero [place], [place]    Office Visit    11 months ago Pharyngitis, unspecified etiology    Chanda Romero [place], [place]    Telemedicine    1 year ago Seasonal allergic rhinitis due to pollen    Timmy Rich Desma Marten, MD    Office Visit    1 year ago Nasal congestion    [address], Chanda [place], [place]    Office Visit    1 year ago Food allergy    Gretel Rich MD    Office Visit

## 2023-04-06 ENCOUNTER — PATIENT MESSAGE (OUTPATIENT)
Dept: FAMILY MEDICINE CLINIC | Facility: CLINIC | Age: 43
End: 2023-04-06

## 2023-05-13 NOTE — TELEPHONE ENCOUNTER
Patient was advised and in agreement they do meet Urgent Care criteria based on triage symptoms. Patient advised if provider feels that their problem is more complex, they may be upgraded from an Urgent Care visit to an Emergency Department visit per provider direction.    rx sent x 1 month.  Please call to schedule follow up appt from initial visit

## 2023-05-19 ENCOUNTER — HOSPITAL ENCOUNTER (OUTPATIENT)
Age: 43
Discharge: HOME | End: 2023-05-19
Payer: COMMERCIAL

## 2023-05-19 VITALS
SYSTOLIC BLOOD PRESSURE: 134 MMHG | TEMPERATURE: 99 F | DIASTOLIC BLOOD PRESSURE: 91 MMHG | OXYGEN SATURATION: 100 % | RESPIRATION RATE: 18 BRPM | HEART RATE: 108 BPM

## 2023-05-19 DIAGNOSIS — B34.9 VIRAL ILLNESS: ICD-10-CM

## 2023-05-19 DIAGNOSIS — Z11.52 ENCOUNTER FOR SCREENING FOR COVID-19: Primary | ICD-10-CM

## 2023-05-19 LAB — SARS-COV-2 RNA RESP QL NAA+PROBE: NOT DETECTED

## 2023-05-19 PROCEDURE — 99213 OFFICE O/P EST LOW 20 MIN: CPT | Performed by: NURSE PRACTITIONER

## 2023-05-19 PROCEDURE — U0002 COVID-19 LAB TEST NON-CDC: HCPCS | Performed by: NURSE PRACTITIONER

## 2023-05-19 NOTE — ED INITIAL ASSESSMENT (HPI)
Patient comes in do to Covid-19 exposure, patient is also feeling fatigued and has some runny nose x 1day

## 2023-05-31 ENCOUNTER — TELEMEDICINE (OUTPATIENT)
Dept: FAMILY MEDICINE CLINIC | Facility: CLINIC | Age: 43
End: 2023-05-31

## 2023-05-31 DIAGNOSIS — R10.9 ABDOMINAL DISCOMFORT: Primary | ICD-10-CM

## 2023-06-02 ENCOUNTER — LAB ENCOUNTER (OUTPATIENT)
Dept: LAB | Facility: HOSPITAL | Age: 43
End: 2023-06-02
Attending: FAMILY MEDICINE
Payer: COMMERCIAL

## 2023-06-02 DIAGNOSIS — R10.9 ABDOMINAL DISCOMFORT: ICD-10-CM

## 2023-06-02 LAB
ALBUMIN SERPL-MCNC: 4.5 G/DL (ref 3.4–5)
ALBUMIN/GLOB SERPL: 1.4 {RATIO} (ref 1–2)
ALP LIVER SERPL-CCNC: 66 U/L
ALT SERPL-CCNC: 36 U/L
ANION GAP SERPL CALC-SCNC: 5 MMOL/L (ref 0–18)
AST SERPL-CCNC: 16 U/L (ref 15–37)
BILIRUB SERPL-MCNC: 1.1 MG/DL (ref 0.1–2)
BUN BLD-MCNC: 15 MG/DL (ref 7–18)
BUN/CREAT SERPL: 13.9 (ref 10–20)
CALCIUM BLD-MCNC: 10.3 MG/DL (ref 8.5–10.1)
CHLORIDE SERPL-SCNC: 104 MMOL/L (ref 98–112)
CO2 SERPL-SCNC: 28 MMOL/L (ref 21–32)
CREAT BLD-MCNC: 1.08 MG/DL
FASTING STATUS PATIENT QL REPORTED: NO
GFR SERPLBLD BASED ON 1.73 SQ M-ARVRAT: 87 ML/MIN/1.73M2 (ref 60–?)
GLOBULIN PLAS-MCNC: 3.3 G/DL (ref 2.8–4.4)
GLUCOSE BLD-MCNC: 86 MG/DL (ref 70–99)
IGA SERPL-MCNC: 116 MG/DL (ref 70–312)
OSMOLALITY SERPL CALC.SUM OF ELEC: 284 MOSM/KG (ref 275–295)
POTASSIUM SERPL-SCNC: 3.8 MMOL/L (ref 3.5–5.1)
PROT SERPL-MCNC: 7.8 G/DL (ref 6.4–8.2)
SODIUM SERPL-SCNC: 137 MMOL/L (ref 136–145)

## 2023-06-02 PROCEDURE — 86364 TISS TRNSGLTMNASE EA IG CLAS: CPT

## 2023-06-02 PROCEDURE — 36415 COLL VENOUS BLD VENIPUNCTURE: CPT

## 2023-06-02 PROCEDURE — 82784 ASSAY IGA/IGD/IGG/IGM EACH: CPT

## 2023-06-02 PROCEDURE — 80053 COMPREHEN METABOLIC PANEL: CPT

## 2023-06-05 LAB — TTG IGA SER-ACNC: 0.3 U/ML (ref ?–7)

## 2023-06-07 ENCOUNTER — APPOINTMENT (OUTPATIENT)
Dept: LAB | Facility: HOSPITAL | Age: 43
End: 2023-06-07
Attending: FAMILY MEDICINE
Payer: COMMERCIAL

## 2023-06-07 PROCEDURE — 87338 HPYLORI STOOL AG IA: CPT

## 2023-06-07 RX ORDER — CETIRIZINE HYDROCHLORIDE 10 MG/1
10 TABLET ORAL DAILY
Qty: 90 TABLET | Refills: 1 | Status: SHIPPED | OUTPATIENT
Start: 2023-06-07

## 2023-06-07 NOTE — TELEPHONE ENCOUNTER
Refill passed per [facility] protocol. Requested Prescriptions   Pending Prescriptions Disp Refills    cetirizine 10 MG Oral Tab 90 tablet 1     Sig: Take 1 tablet (10 mg total) by mouth daily. Allergy Medication Protocol Passed - 6/6/2023 10:06 PM        Passed - In person appointment or virtual visit in the past 12 mos or appointment in next 3 mos     Recent Outpatient Visits              1 week ago Abdominal discomfort    [address], [address], [place], [address] [facility] [address]    8 months ago Routine physical examination    [address], [address], [place], [place]    Office Visit    1 year ago Pharyngitis, unspecified etiology    [facility] [place]    [facility]icine    1 year ago Seasonal allergic rhinitis due to pollen    Louie Timmy Sandra Tonye Robin, MD    Office Visit    1 year ago Nasal congestion    Chanda Wh[place]ton [place], [facility]    Office Visit          Future Appointments         Provider Department Appt Notes    In 1 week Rebekah Perez, DO Lakesha [address] GERD? BP.                     Recent Outpatient Visits              1 week ago Abdominal discomfort    [address], [address]    8 months ago Routine physical examination    Chanda Wh[place] [place], [facility]    Office Visit    1 year ago Pharyngitis, unspecified etiology    Chanda Whittington [place], [facility]    Telemedicine    1 year ago Seasonal allergic rhinitis due to pollen    Louie Timmy Sandra Tonye Robin, MD    Office Visit    1 year ago Nasal congestion    [address], [place], [place]    Office Visit            Future Appointments         Provider Department Appt Notes    In 1 week Malcolm Postin, DO [address] [facility], [place] GERD?  BP.

## 2023-06-08 ENCOUNTER — LAB ENCOUNTER (OUTPATIENT)
Dept: LAB | Facility: HOSPITAL | Age: 43
End: 2023-06-08
Attending: FAMILY MEDICINE
Payer: COMMERCIAL

## 2023-06-08 DIAGNOSIS — R10.9 ABDOMINAL DISCOMFORT: ICD-10-CM

## 2023-06-13 DIAGNOSIS — R11.0 NAUSEA: Primary | ICD-10-CM

## 2023-06-13 LAB — H PYLORI AG STL QL IA: NEGATIVE

## 2023-06-15 ENCOUNTER — OFFICE VISIT (OUTPATIENT)
Dept: FAMILY MEDICINE CLINIC | Facility: CLINIC | Age: 43
End: 2023-06-15

## 2023-06-15 VITALS
SYSTOLIC BLOOD PRESSURE: 113 MMHG | DIASTOLIC BLOOD PRESSURE: 75 MMHG | BODY MASS INDEX: 32 KG/M2 | WEIGHT: 229 LBS | HEART RATE: 80 BPM | RESPIRATION RATE: 16 BRPM | TEMPERATURE: 98 F

## 2023-06-15 DIAGNOSIS — J45.20 MILD INTERMITTENT ASTHMA WITHOUT COMPLICATION: ICD-10-CM

## 2023-06-15 DIAGNOSIS — I10 PRIMARY HYPERTENSION: ICD-10-CM

## 2023-06-15 DIAGNOSIS — R10.9 ABDOMINAL DISCOMFORT: Primary | ICD-10-CM

## 2023-06-15 PROCEDURE — 3078F DIAST BP <80 MM HG: CPT | Performed by: FAMILY MEDICINE

## 2023-06-15 PROCEDURE — 99213 OFFICE O/P EST LOW 20 MIN: CPT | Performed by: FAMILY MEDICINE

## 2023-06-15 PROCEDURE — 90472 IMMUNIZATION ADMIN EACH ADD: CPT | Performed by: FAMILY MEDICINE

## 2023-06-15 PROCEDURE — 90471 IMMUNIZATION ADMIN: CPT | Performed by: FAMILY MEDICINE

## 2023-06-15 PROCEDURE — 90715 TDAP VACCINE 7 YRS/> IM: CPT | Performed by: FAMILY MEDICINE

## 2023-06-15 PROCEDURE — 3074F SYST BP LT 130 MM HG: CPT | Performed by: FAMILY MEDICINE

## 2023-06-15 PROCEDURE — 90677 PCV20 VACCINE IM: CPT | Performed by: FAMILY MEDICINE

## 2023-06-15 RX ORDER — OMEPRAZOLE 40 MG/1
40 CAPSULE, DELAYED RELEASE ORAL DAILY
Qty: 90 CAPSULE | Refills: 3 | Status: SHIPPED | OUTPATIENT
Start: 2023-06-15 | End: 2024-06-09

## 2023-06-15 RX ORDER — OMEPRAZOLE 20 MG/1
20 CAPSULE, DELAYED RELEASE ORAL
COMMUNITY
End: 2023-06-15 | Stop reason: DRUGHIGH

## 2023-06-26 ENCOUNTER — TELEPHONE (OUTPATIENT)
Facility: CLINIC | Age: 43
End: 2023-06-26

## 2023-06-26 NOTE — TELEPHONE ENCOUNTER
Called patient, name/ verified. Pt is asking for a sooner appointment for stomach acidity and nausea, denies trouble swallowing. Offered 1st available appointment. Patient confirmed and accepted appointment. Date, time, office location and contact number verified. Instructed patient to arrive 15 minutes before appt time and bring insurance card. Patient verbalized understanding. Your appointments       Date & Time Appointment Department [facility])    2023  1:30 PM CDT Consult with Rayford Hammans, MD [address], [address] [facility],[address], [facility] [address]      appt with Sallyann Saint cancelled from appt desk.

## 2023-06-26 NOTE — TELEPHONE ENCOUNTER
Patient calling regards trouble swallowing and nausea, expresses frustration. Pt scheduled appointment 9/7/23 with LILO Sullivan.

## 2023-06-28 ENCOUNTER — TELEPHONE (OUTPATIENT)
Dept: GASTROENTEROLOGY | Facility: CLINIC | Age: 43
End: 2023-06-28

## 2023-06-28 ENCOUNTER — OFFICE VISIT (OUTPATIENT)
Dept: GASTROENTEROLOGY | Facility: CLINIC | Age: 43
End: 2023-06-28

## 2023-06-28 VITALS
BODY MASS INDEX: 31.92 KG/M2 | DIASTOLIC BLOOD PRESSURE: 91 MMHG | WEIGHT: 228 LBS | HEART RATE: 80 BPM | HEIGHT: 71 IN | SYSTOLIC BLOOD PRESSURE: 145 MMHG

## 2023-06-28 DIAGNOSIS — R11.2 NAUSEA AND VOMITING, UNSPECIFIED VOMITING TYPE: ICD-10-CM

## 2023-06-28 DIAGNOSIS — K21.9 GASTROESOPHAGEAL REFLUX DISEASE, UNSPECIFIED WHETHER ESOPHAGITIS PRESENT: Primary | ICD-10-CM

## 2023-06-28 PROCEDURE — 3008F BODY MASS INDEX DOCD: CPT | Performed by: INTERNAL MEDICINE

## 2023-06-28 PROCEDURE — 3077F SYST BP >= 140 MM HG: CPT | Performed by: INTERNAL MEDICINE

## 2023-06-28 PROCEDURE — 3080F DIAST BP >= 90 MM HG: CPT | Performed by: INTERNAL MEDICINE

## 2023-06-28 PROCEDURE — 99204 OFFICE O/P NEW MOD 45 MIN: CPT | Performed by: INTERNAL MEDICINE

## 2023-08-29 PROCEDURE — 88312 SPECIAL STAINS GROUP 1: CPT | Performed by: INTERNAL MEDICINE

## 2023-08-29 PROCEDURE — 88305 TISSUE EXAM BY PATHOLOGIST: CPT | Performed by: INTERNAL MEDICINE

## 2023-09-06 ENCOUNTER — TELEPHONE (OUTPATIENT)
Dept: FAMILY MEDICINE CLINIC | Facility: CLINIC | Age: 43
End: 2023-09-06

## 2023-09-26 ENCOUNTER — MED REC SCAN ONLY (OUTPATIENT)
Facility: CLINIC | Age: 43
End: 2023-09-26

## 2023-10-09 ENCOUNTER — IMMUNIZATION (OUTPATIENT)
Dept: LAB | Age: 43
End: 2023-10-09
Attending: EMERGENCY MEDICINE
Payer: COMMERCIAL

## 2023-10-09 DIAGNOSIS — Z23 NEED FOR VACCINATION: Primary | ICD-10-CM

## 2023-10-09 PROCEDURE — 90471 IMMUNIZATION ADMIN: CPT

## 2023-10-09 PROCEDURE — 90480 ADMN SARSCOV2 VAC 1/ONLY CMP: CPT

## 2023-10-09 PROCEDURE — 90686 IIV4 VACC NO PRSV 0.5 ML IM: CPT

## 2023-10-16 ENCOUNTER — PATIENT MESSAGE (OUTPATIENT)
Dept: FAMILY MEDICINE CLINIC | Facility: CLINIC | Age: 43
End: 2023-10-16

## 2023-10-27 ENCOUNTER — PATIENT MESSAGE (OUTPATIENT)
Dept: FAMILY MEDICINE CLINIC | Facility: CLINIC | Age: 43
End: 2023-10-27

## 2023-10-30 RX ORDER — BUSPIRONE HYDROCHLORIDE 15 MG/1
15 TABLET ORAL 2 TIMES DAILY
COMMUNITY
Start: 2023-10-25

## 2023-10-30 NOTE — TELEPHONE ENCOUNTER
From: Keyla Spears  To: Jorge Man  Sent: 10/27/2023 9:13 AM CDT  Subject: New Medication    Amandeep Diallo,     My psychiatrist has put me on anti-anxiety meds. I am taking Buspirone HCI 15 MG 2/day. Please let me know if you have any questions.      Thank you,     Courtney Mujica

## 2023-11-01 ENCOUNTER — NURSE ONLY (OUTPATIENT)
Dept: FAMILY MEDICINE CLINIC | Facility: CLINIC | Age: 43
End: 2023-11-01

## 2023-11-01 VITALS — DIASTOLIC BLOOD PRESSURE: 92 MMHG | SYSTOLIC BLOOD PRESSURE: 140 MMHG

## 2023-11-01 DIAGNOSIS — I10 PRIMARY HYPERTENSION: Primary | ICD-10-CM

## 2023-12-19 RX ORDER — MONTELUKAST SODIUM 10 MG/1
10 TABLET ORAL NIGHTLY
Qty: 90 TABLET | Refills: 3 | OUTPATIENT
Start: 2023-12-19

## 2023-12-19 RX ORDER — CETIRIZINE HYDROCHLORIDE 10 MG/1
10 TABLET ORAL DAILY
Qty: 90 TABLET | Refills: 0 | OUTPATIENT
Start: 2023-12-19

## 2023-12-19 RX ORDER — CETIRIZINE HYDROCHLORIDE 10 MG/1
10 TABLET ORAL DAILY
Qty: 90 TABLET | Refills: 3 | Status: SHIPPED | OUTPATIENT
Start: 2023-12-19

## 2023-12-19 NOTE — TELEPHONE ENCOUNTER
Refill passed per [place] [facility] [place] protocol. Requested Prescriptions   Pending Prescriptions Disp Refills    cetirizine 10 MG Oral Tab 90 tablet 1     Sig: Take 1 tablet (10 mg total) by mouth daily. Allergy Medication Protocol Passed - 12/18/2023  2:51 PM        Passed - In person appointment or virtual visit in the past 12 mos or appointment in next 3 mos     Recent Outpatient Visits              1 month ago Primary hypertension    [facility] [place]    Nurse Only    5 months ago Gastroesophageal reflux disease, unspecified whether esophagitis present    [address] [facility]    Office Visit    6 months ago Abdominal discomfort    [address] [facility], [place], [place]    Office Visit    6 months ago Abdominal discomfort    [address] [facility], [place], [address] [place] [address]    1 year ago Routine physical examination    [address] [facility], [place], [place]    Office Visit          Future Appointments         Provider Department Appt Notes    In 2 weeks DO Katarina Coats, [address] [place] [address], [address] BP                Refused Prescriptions Disp Refills    montelukast 10 MG Oral Tab 90 tablet 3     Sig: Take 1 tablet (10 mg total) by mouth nightly.        Asthma & COPD Medication Protocol Passed - 12/18/2023  2:51 PM        Passed - In person appointment or virtual visit in the past 6 mos or appointment in next 3 mos     Recent Outpatient Visits              1 month ago Primary hypertension    [facility], [place], [place]    Nurse Only    5 months ago Gastroesophageal reflux disease, unspecified whether esophagitis present    [address] [facility] [place]    Office Visit    6 months ago Abdominal discomfort    [facility], [address], [place], [place]    Office Visit    6 months ago Abdominal discomfort    [address]    1 year ago Routine physical examination    [address] [place] [address]    Office Visit          Future Appointments         Provider Department Appt Notes    In 2 weeks Mark Horton  [facility], [place]                  Recent Outpatient Visits              1 month ago Primary hypertension    [facility], [address], [place]    Nurse Only    5 months ago Gastroesophageal reflux disease, unspecified whether esophagitis present    [address] [place], [place] [address]Dariel MD    Office Visit    6 months ago Abdominal discomfort    Taberg Boas, [address], [place], [place]    Office Visit    6 months ago Abdominal discomfort    [address] [facility], [address] [place] [address]    1 year ago Routine physical examination    Taberg Boas, [address], [place], [place]    Office Visit          Future Appointments         Provider Department Appt Notes    In 2 weeks Mark Horton DO Tab[address] Boas, [address], [place] BP

## 2023-12-19 NOTE — TELEPHONE ENCOUNTER
Duplicate request, previously addressed. Disp Refills Start End    cetirizine 10 MG Oral Tab 90 tablet 3 12/19/2023 --    Sig - Route:  Take 1 tablet (10 mg total) by mouth daily. - Oral    Sent to pharmacy as: Cetirizine HCl 10 MG Oral Tablet (ZyrTEC)    E-Prescribing Status: Receipt confirmed by pharmacy (12/19/2023 10:44 AM CST)      Pharmacy    [id number] Kevin Montgomery, [address] [place] [address] Road [phone], [phone]

## 2024-01-02 ENCOUNTER — OFFICE VISIT (OUTPATIENT)
Dept: FAMILY MEDICINE CLINIC | Facility: CLINIC | Age: 44
End: 2024-01-02
Payer: COMMERCIAL

## 2024-01-02 ENCOUNTER — LAB ENCOUNTER (OUTPATIENT)
Dept: LAB | Age: 44
End: 2024-01-02
Attending: FAMILY MEDICINE
Payer: COMMERCIAL

## 2024-01-02 VITALS
BODY MASS INDEX: 29.21 KG/M2 | DIASTOLIC BLOOD PRESSURE: 80 MMHG | TEMPERATURE: 98 F | RESPIRATION RATE: 16 BRPM | HEART RATE: 100 BPM | WEIGHT: 211 LBS | HEIGHT: 71.26 IN | SYSTOLIC BLOOD PRESSURE: 122 MMHG

## 2024-01-02 DIAGNOSIS — Z12.11 SCREENING FOR COLON CANCER: ICD-10-CM

## 2024-01-02 DIAGNOSIS — I10 PRIMARY HYPERTENSION: ICD-10-CM

## 2024-01-02 DIAGNOSIS — Z00.00 ROUTINE PHYSICAL EXAMINATION: ICD-10-CM

## 2024-01-02 DIAGNOSIS — F90.9 ATTENTION DEFICIT HYPERACTIVITY DISORDER (ADHD), UNSPECIFIED ADHD TYPE: ICD-10-CM

## 2024-01-02 DIAGNOSIS — Z00.00 ROUTINE PHYSICAL EXAMINATION: Primary | ICD-10-CM

## 2024-01-02 DIAGNOSIS — J45.20 MILD INTERMITTENT ASTHMA WITHOUT COMPLICATION: ICD-10-CM

## 2024-01-02 DIAGNOSIS — F41.9 ANXIETY: ICD-10-CM

## 2024-01-02 LAB
ALBUMIN SERPL-MCNC: 5 G/DL (ref 3.2–4.8)
ALBUMIN/GLOB SERPL: 1.7 {RATIO} (ref 1–2)
ALP LIVER SERPL-CCNC: 74 U/L
ALT SERPL-CCNC: 33 U/L
ANION GAP SERPL CALC-SCNC: 5 MMOL/L (ref 0–18)
AST SERPL-CCNC: 25 U/L (ref ?–34)
BILIRUB SERPL-MCNC: 1.5 MG/DL (ref 0.3–1.2)
BUN BLD-MCNC: 12 MG/DL (ref 9–23)
BUN/CREAT SERPL: 12.2 (ref 10–20)
CALCIUM BLD-MCNC: 10.3 MG/DL (ref 8.7–10.4)
CHLORIDE SERPL-SCNC: 101 MMOL/L (ref 98–112)
CHOLEST SERPL-MCNC: 220 MG/DL (ref ?–200)
CO2 SERPL-SCNC: 29 MMOL/L (ref 21–32)
CREAT BLD-MCNC: 0.98 MG/DL
DEPRECATED RDW RBC AUTO: 39.6 FL (ref 35.1–46.3)
EGFRCR SERPLBLD CKD-EPI 2021: 97 ML/MIN/1.73M2 (ref 60–?)
ERYTHROCYTE [DISTWIDTH] IN BLOOD BY AUTOMATED COUNT: 12.5 % (ref 11–15)
FASTING PATIENT LIPID ANSWER: YES
FASTING STATUS PATIENT QL REPORTED: YES
GLOBULIN PLAS-MCNC: 3 G/DL (ref 2.8–4.4)
GLUCOSE BLD-MCNC: 96 MG/DL (ref 70–99)
HCT VFR BLD AUTO: 48.5 %
HDLC SERPL-MCNC: 63 MG/DL (ref 40–59)
HGB BLD-MCNC: 16.9 G/DL
LDLC SERPL CALC-MCNC: 137 MG/DL (ref ?–100)
MCH RBC QN AUTO: 30.6 PG (ref 26–34)
MCHC RBC AUTO-ENTMCNC: 34.8 G/DL (ref 31–37)
MCV RBC AUTO: 87.9 FL
NONHDLC SERPL-MCNC: 157 MG/DL (ref ?–130)
OSMOLALITY SERPL CALC.SUM OF ELEC: 280 MOSM/KG (ref 275–295)
PLATELET # BLD AUTO: 409 10(3)UL (ref 150–450)
POTASSIUM SERPL-SCNC: 3.6 MMOL/L (ref 3.5–5.1)
PROT SERPL-MCNC: 8 G/DL (ref 5.7–8.2)
RBC # BLD AUTO: 5.52 X10(6)UL
SODIUM SERPL-SCNC: 135 MMOL/L (ref 136–145)
TRIGL SERPL-MCNC: 116 MG/DL (ref 30–149)
TSI SER-ACNC: 0.5 MIU/ML (ref 0.55–4.78)
VLDLC SERPL CALC-MCNC: 21 MG/DL (ref 0–30)
WBC # BLD AUTO: 12.1 X10(3) UL (ref 4–11)

## 2024-01-02 PROCEDURE — 85027 COMPLETE CBC AUTOMATED: CPT

## 2024-01-02 PROCEDURE — 3008F BODY MASS INDEX DOCD: CPT | Performed by: FAMILY MEDICINE

## 2024-01-02 PROCEDURE — 80061 LIPID PANEL: CPT

## 2024-01-02 PROCEDURE — 99396 PREV VISIT EST AGE 40-64: CPT | Performed by: FAMILY MEDICINE

## 2024-01-02 PROCEDURE — 80053 COMPREHEN METABOLIC PANEL: CPT

## 2024-01-02 PROCEDURE — 84443 ASSAY THYROID STIM HORMONE: CPT

## 2024-01-02 PROCEDURE — 3079F DIAST BP 80-89 MM HG: CPT | Performed by: FAMILY MEDICINE

## 2024-01-02 PROCEDURE — 36415 COLL VENOUS BLD VENIPUNCTURE: CPT

## 2024-01-02 PROCEDURE — 3074F SYST BP LT 130 MM HG: CPT | Performed by: FAMILY MEDICINE

## 2024-01-02 NOTE — PROGRESS NOTES
HPI:  43 yr old male who presents for physical.  with 2 girls. Works from home.  Lost more weight secondary to dietary and lifestyle changes. Gets some exercise.     Due for colonoscopy.     Following with Psychiatry for ADHD and anxiety. Started Buspirone. Sleeping better. Has therapist.     Mild intermittent asthma is very controlled unless he is around cats. Has not used inhaler in a few months.     PMHx: reviewed, see chart  PSHx: reviewed, see chart  All: Crab (diagnostic)   Meds: see chart    ROS:   Allergic/Immuno:  Negative for environmental allergies and food allergies  Cardiovascular:  Negative for chest pain and irregular heartbeat/palpitations  Constitutional:  Negative for decreased activity, fever, irritability and lethargy  Endocrine:  Negative for abnormal sleep patterns, increased activity, polydipsia and polyphagia  ENMT:  Negative for ear drainage, hearing loss and nasal drainage  Eyes:  Negative for eye discharge and vision loss  Gastrointestinal:  Negative for abdominal pain, constipation, decreased appetite, diarrhea and vomiting  Genitourinary:  Negative for dysuria and hematuria  Hema/Lymph:  Negative for easy bleeding and easy bruising  Integumentary:  Negative for pruritus and rash  Musculoskeletal:  Negative for bone/joint symptoms  Neurological:  Negative for gait disturbance  Psychiatric:  Negative for inappropriate interaction and psychiatric symptoms    PE:  /80   Pulse 100   Temp 97.8 °F (36.6 °C) (Temporal)   Resp 16   Ht 5' 11.26\" (1.81 m)   Wt 211 lb (95.7 kg)   BMI 29.21 kg/m²   Gen:  Well-nourished.  No distress.  HEENT: Conjunctive clear.  Alber ear canals clear.  Alber TMs intact with good landmarks noted.  Nares patent.  Oral mucous membrane moist.  Normal lips, teeth, and gums.  Oropharynx normal.  Neck supple.  Good ROM.  No LAD.  Thyroid normal.  CV:  Regular rate and rhythm; no murmurs  Lungs:  Clear to ausculation; good aeration               No wheezes,  rales or rhonchi  Abd: soft, non-tender, non-distended          Normal bowel sounds; no masses          No hepatosplenomegaly  Extremities: No cyanosis, clubbing, edema.  Pedal pulses 2+ homa.    A/P:  Encounter Diagnoses   Name Primary?    Routine physical examination    Congratulated on weight loss.  Encouraged pt to exercise and stress.    Yes    Primary hypertension    More controlled today.  Will monitor.       Screening for colon cancer    Refer to GI.        Anxiety    Stable.  Follows with Psychiatry and therapy.       Attention deficit hyperactivity disorder (ADHD), unspecified ADHD type    Stable on Strattera. Follows with Psychiatry.        Mild intermittent asthma without complication    Controlled.         F/U 6 months.     Colleen Weiler, DO

## 2024-01-04 DIAGNOSIS — R79.89 LOW TSH LEVEL: ICD-10-CM

## 2024-01-04 DIAGNOSIS — R79.89 ELEVATED LFTS: Primary | ICD-10-CM

## 2024-01-10 ENCOUNTER — NURSE ONLY (OUTPATIENT)
Facility: CLINIC | Age: 44
End: 2024-01-10

## 2024-01-10 DIAGNOSIS — Z12.11 COLON CANCER SCREENING: Primary | ICD-10-CM

## 2024-01-10 NOTE — PROGRESS NOTES
Dr. Magdaleno,   Pt had EGD with you on 8/29/23 and prefers to have colon done with you. Pt states he was told to repeat EGD also. Please review and give orders when able.     Called patient for scheduled telephone colon screening/positive FIT result.   Medications, pharmacy, and allergies verified with the patient.   Please advise on colonoscopy and bowel prep orders.     Age 45-66 y/o (Y/N): Y  66-74 y/o ? Route to GI provider per rotation schedule   › GI MD preference: Rasta  › Insurance:  [employer]BS IH HMO  › Last PCP Visit: 1/2/24  IF NO PCP within [facility] system ? Not TCS/FIT Eligible   › Last CBC drawn: 1/2/24  › Date of positive FIT test: No  › H/W/BMI: 5'11\"/ 211 lbs    Special comments/notes: Pt had EGD done on 8/29/23. Pt states he was told to repeat EGD also.    Telephone colon screening Questionnaires:  Yes No   Are you currently experiencing any new GI symptoms? [] [x]   If yes, symptom details:     Rectal Bleeding with or without bowel movements: [] [x]   Black stool: [] [x]   Dysphagia &Food feeling/getting stuck: [] [x]   Intractable Vomiting: [] [x]   Unexplained weight loss: [] [x]   First colonoscopy? [x] []   Family history of colon cancer? [x] []   Any issues with anesthesia? [] [x]   If yes, explain details:      Personal history of Resp. Issues/Oxygen Use/VELMA/COPD? [] [x]   If yes, CPAP/BiPAP? [] []   History of devices Pacemaker/Defibrillator/Stents? [] [x]   History of Cardiac/CVA issues/(MI/Stroke):  [] [x]     Medication usage:  Yes  No   Anticoagulants:  Anticoagulant (Except Aspirin) ? Route to RN staff to obtain ordering provider orders [] [x]   Diabetic Meds:   PO DM Meds ? Hold day prior and day of procedure  Insulin ? Route to RN clinical staff to obtain provider orders  [] [x]   Weight loss meds (phentermine/Vyvanse/Saxsenda): [] [x]   Iron/Herbal/Multivitamin Supplement (RX/OTC): [x] []   Usage of marijuana, CBD &/or vape products: [x] []

## 2024-01-29 NOTE — PROGRESS NOTES
Snehal Magdaleno MD21 minutes ago (3:55 PM)     1. Schedule colonoscopy with MAC [Diagnosis: screening]     2.  bowel prep from pharmacy (split suprep or trilyte)     3. Continue all medications for procedure except     ** If MAC @ [facility]/NE:                 - NO alcohol, recreational drugs nor erectile dysfunction mediations 72 hours before procedure(s)                - NO herbal supplements or weight loss medications x 7 days prior to the procedure(s)     ** If MAC @ [facility] or IV twilight - continue all medications as prescribed        4. Read all bowel prep instructions carefully     5. AVOID seeds, nuts, popcorn, raw fruits and vegetables (cooked is okay) for 2-3 days before procedure        >>>Please note: if you were prescribed Suprep for the bowel prep and it is too expensive or not covered by insurance, it is okay to substitute Trilyte (or any similar generic prep). This can be done by notifying the pharmacy or calling our office.

## 2024-01-29 NOTE — H&P
1. Schedule colonoscopy with MAC [Diagnosis: screening]    2.  bowel prep from pharmacy (split suprep or trilyte)    3. Continue all medications for procedure except    ** If MAC @ [facility]/NE:    - NO alcohol, recreational drugs nor erectile dysfunction mediations 72 hours before procedure(s)   - NO herbal supplements or weight loss medications x 7 days prior to the procedure(s)    ** If MAC @ [facility] or  twilit - continue all medications as prescribed      4. Read all bowel prep instructions carefully    5. AVOID seeds, nuts, popcorn, raw fruits and vegetables (cooked is okay) for 2-3 days before procedure      >>>Please note: if you were prescribed Suprep for the bowel prep and it is too expensive or not covered by insurance, it is okay to substitute Trilyte (or any similar generic prep). This can be done by notifying the pharmacy or calling our office.

## 2024-02-06 NOTE — PROGRESS NOTES
Patient is returning the schedulers call. I tried to reach the nurse but not available. Patient would like to schedule his procedure.

## 2024-02-06 NOTE — PROGRESS NOTES
Scheduled for:  Colonoscopy [id number]  Provider Name:  Dr. Magdalneo  Date:  5/7/2024  Location:  [facility]  Sedation:  [facility]  Time: 2:15 PM - Patient is aware [facility] will call the day before with arrival time.  Prep:  Suprep  Meds/Allergies Reconciled?:  Physician reviewed  Diagnosis with codes:  Colon cancer screening Z12.11   Was patient informed to call insurance with codes (Y/N):  Yes, I [facility] with [facility].  Referral sent?:  Referral was sent at the time of electronic surgical scheduling.  EM or [facility] notified?:  I sent an electronic request to Endo Scheduling and received a confirmation today.  Medication Orders: Hold multivitamins one week prior to procedure.  Misc Orders:  N/A     Further instructions given by staff: I discussed the prep instructions with the patient which he verbally understood and is aware that I will send the instructions today.

## 2024-02-06 NOTE — PROGRESS NOTES
This is the third attempt to reach this pt in regards to scheduling with NO success scheduling procedure. LMTCB    Letter sent via [employer] and mail.    TE closed.

## 2024-02-06 NOTE — PROGRESS NOTES
Dr. Magdaleno -    Patient is scheduled for a colonoscopy with you on 5/7/2024. Patient is asking if he should also have an EGD scheduled since his symptoms have not resolved and is wondering on what the next steps should be.    He also needs the bowel prep sent to the [facility] in [place] on file.    Please advise - thank you!    Katey

## 2024-02-07 ENCOUNTER — TELEPHONE (OUTPATIENT)
Facility: CLINIC | Age: 44
End: 2024-02-07

## 2024-02-07 DIAGNOSIS — R13.19 ESOPHAGEAL DYSPHAGIA: Primary | ICD-10-CM

## 2024-02-07 DIAGNOSIS — K21.9 GASTROESOPHAGEAL REFLUX DISEASE WITHOUT ESOPHAGITIS: ICD-10-CM

## 2024-02-08 ENCOUNTER — PATIENT MESSAGE (OUTPATIENT)
Dept: FAMILY MEDICINE CLINIC | Facility: CLINIC | Age: 44
End: 2024-02-08

## 2024-02-08 DIAGNOSIS — K21.9 GASTROESOPHAGEAL REFLUX DISEASE, UNSPECIFIED WHETHER ESOPHAGITIS PRESENT: Primary | ICD-10-CM

## 2024-02-08 NOTE — TELEPHONE ENCOUNTER
Dr. Magdaleno,   I think the schedulers sent you a message about pt asking to have EGD added to the colonoscopy scheduled for on 5/7/24. EGD report from 8/30/23 mentioned maybe referring to [facility] for manometry.    Do want to add EGD and/or refer to [facility] for manometry?   Thanks,  Chyna

## 2024-02-08 NOTE — H&P
EGD less than a year ago so no EGD  See telephone note from today -- esophagram and referral for manometry      Snehal Magdaleno MD

## 2024-02-09 NOTE — TELEPHONE ENCOUNTER
From: Hussein Davila  To: Colleen Weiler  Sent: 2/8/2024 1:54 PM CST  Subject: GI Referral    Dr. Weiler,    Can you please process a referral for me to see Dr. Carmona? I’m still experiencing daily stomach reflux and discomfort.     Thanks,    Alphonse

## 2024-02-09 NOTE — TELEPHONE ENCOUNTER
Dr. Weiler, please see pending referral and advise. Thanks.    Future Appointments   Date Time Provider Department Center   2/14/2024  1:00 PM Radha Soliz, LCSW [facility] [place] [facility]   2/21/2024  1:00 PM Radha Soliz, LCSW [facility] [place] [facility]   2/28/2024 10:30 AM [facility]   2/28/2024  1:00 PM Radha Soliz LCSW [facility] [place] [facility]   3/6/2024  1:00 PM Radha Soliz, LCSW [facility] [place] [facility]   3/13/2024  1:00 PM Radha Soliz, LCSW [facility] [place] [facility]   3/20/2024  1:00 PM Radha Soliz, LCSW [facility] [place] [facility]   3/27/2024  1:00 PM Radha Soliz, LCSW [facility] [place] [facility]   5/7/2024  2:15 PM KULDEEP, PROCEDURE [id number] None

## 2024-02-13 NOTE — TELEPHONE ENCOUNTER
RN called and spoke to pt. Discussed that Dr. Magdaleno did not recommend repeat EGD at this time and ordered an esophagram and wrote referral to [facility] for manometry.    Informed pt that referral for [facility] was sent and received confirmation that it was sent successfully.    Informed pt that phone numbers for [facility] and for central scheduling will be sent in a Kashhart so pt can call to schedule appropriate appts when able.    Discussed bowel prep preference; pt preferred to use traditional 4 L bowel r/t cost. Bowel prep order sent to [facility] as requested.    Pt verbalized understanding and had no further needs at this time.         RN faxed referral for manometry to [facility]. Faxed failed x 2 to fax #[phone]. Fax sent successfully to fax #[phone].

## 2024-02-16 ENCOUNTER — TELEPHONE (OUTPATIENT)
Dept: ADMINISTRATIVE | Age: 44
End: 2024-02-16

## 2024-02-16 DIAGNOSIS — R13.19 ESOPHAGEAL DYSPHAGIA: Primary | ICD-10-CM

## 2024-02-16 DIAGNOSIS — K21.9 GASTROESOPHAGEAL REFLUX DISEASE, UNSPECIFIED WHETHER ESOPHAGITIS PRESENT: ICD-10-CM

## 2024-02-16 NOTE — TELEPHONE ENCOUNTER
Hello    This request has been closed by [employer].   Please see the message below.  Thank you  Shey     Comment:   There is not sufficient clinical documentation to substantiate the use of [facility] services. Please refer this member to an in-network provider prior to referral to out of network. If no in-network provider is available, please consider referral to a tertiary provider: [facility] or [facility]. Per [facility] Guidelines regarding out of network/tertiary services, a letter of medical necessity verifying the rarity of this member's illness that precludes treatment within the [facility] network is required to support the use of an out of network or tertiary facility and will be subject to [facility] review.

## 2024-02-20 NOTE — TELEPHONE ENCOUNTER
Patient should find out what is in network for manometry-- possibly [facility] or [facility] for manometry

## 2024-02-23 NOTE — TELEPHONE ENCOUNTER
Dr. Magdaleno,   I contacted [facility]; they do esophageal manomtery at their [place] location. The staff member I spoke to said that we need to place an order in [facility] and give the pt their number to call and schedule.     I tried to order esophageal manometry but could not find it. I also left a message for the pt to see if he was agreeable to going to [facility]/[place]. I have not heard back from him yet.    Are you agreeable to placing an order just in case he is agreeable?  Thanks,  Chyna    Pt needs to call  #[phone] to schedule through [facility]

## 2024-02-28 NOTE — TELEPHONE ENCOUNTER
RN DARLIN, informed pt that a [employer] message was sent with detailed information regarding esophageal manometry (initially referred to [facility] but there seems to be an insurance issue with that). GI office number provided for pt to call and tell us how he wants to proceed. Informed pt he could also send a [email or web address] message.

## 2024-03-01 RX ORDER — AMLODIPINE BESYLATE 10 MG/1
10 TABLET ORAL
Qty: 90 TABLET | Refills: 3 | Status: SHIPPED | OUTPATIENT
Start: 2024-03-01

## 2024-03-01 NOTE — TELEPHONE ENCOUNTER
Refill passed per [facility] protocol.  Requested Prescriptions   Pending Prescriptions Disp Refills    amLODIPine 10 MG Oral Tab 90 tablet 3     Sig: Take 1 tablet (10 mg total) by mouth once daily.       Hypertension Medications Protocol Passed - 2/29/2024  8:45 PM        Passed - CMP or BMP in past 12 months        Passed - Last BP reading less than 140/90     BP Readings from Last 1 Encounters:   01/02/24 122/80               Passed - In person appointment or virtual visit in the past 12 mos or appointment in next 3 mos     Recent Outpatient Visits              1 month ago Colon cancer screening    [facility]    Nurse Only    1 month ago Routine physical examination    [facility] Weiler, Colleen M, DO    Office Visit    4 months ago Primary hypertension    [facility]    Nurse Only    8 months ago Gastroesophageal reflux disease, unspecified whether esophagitis present    [facility] Snehal Magdaleno MD    Office Visit    8 months ago Abdominal discomfort    [facility] Weiler, Colleen M, DO    Office Visit          Future Appointments         Provider Department Appt Notes    In 2 months ANDREEA MAGDALENO [facility] CLN w/MAC @ [facility]               Passed - EGFRCR or GFRNAA > 50     GFR Evaluation  EGFRCR: 97 , resulted on 1/2/2024             Recent Outpatient Visits              1 month ago Colon cancer screening    [facility]    Nurse Only    1 month ago Routine physical examination    [facility] Weiler, Colleen M, DO    Office Visit    4 months ago Primary hypertension    [facility]    Nurse Only    8 months ago Gastroesophageal reflux  disease, unspecified whether [facility]ophagitis [facility]    Office [facility]    8 months ago Abdominal discomfort    [facility] Weiler, Colleen M,     Office Visit          Future Appointments         Provider Department Appt Notes    In 2 months KULDEEP, ANDREEA [facility] CLN w/MAC @ [facility]

## 2024-03-08 NOTE — TELEPHONE ENCOUNTER
RN called [facility] and spoke to Christy to get assistance with placing esophageal manometry order. Referral for manometry in [facility]. RN spoke with Christy for 30 minutes and unable to get order placed; Christy states they can schedule pt from referral (and they will place esophageal manometry order themselves).    RN sent IDbyME message to pt and provided [facility] phone number and reiterated that they will schedule him for test and the [place] location.

## 2024-03-08 NOTE — TELEPHONE ENCOUNTER
RN called ph #[phone] ([facility]) to inquire about esophageal manometry orders. RN LMTCB, provided GI RN triage line. RN placed in-network referral for esophageal manometry. Will await call back from [facility] office as they scheduled [facility].

## 2024-03-19 RX ORDER — MONTELUKAST SODIUM 10 MG/1
10 TABLET ORAL NIGHTLY
Qty: 90 TABLET | Refills: 3 | Status: SHIPPED | OUTPATIENT
Start: 2024-03-19

## 2024-03-19 NOTE — TELEPHONE ENCOUNTER
Refill passed per [facility] protocol.    Requested Prescriptions   Pending Prescriptions Disp Refills    MONTELUKAST 10 MG Oral Tab [Pharmacy Med Name: Montelukast Sodium 10 Mg Tab Auro] 90 tablet 0     Sig: Take 1 tablet (10 mg total) by mouth nightly.       Asthma & COPD Medication Protocol Passed - 3/18/2024  9:26 AM        Passed - Asthma Action Score greater than or equal to 20        Passed - Appointment in past 6 or next 3 months      Recent Outpatient Visits              2 months ago Colon cancer screening    [facility]    Nurse Only    2 months ago Routine physical examination    [facility] Weiler, Colleen M, DO    Office Visit    4 months ago Primary hypertension    [facility]    Nurse Only    8 months ago Gastroesophageal reflux disease, unspecified whether esophagitis present    [facility] Snehal Magdaleno MD    Office Visit    9 months ago Abdominal discomfort    [facility] Weiler, Colleen M, DO    Office Visit          Future Appointments         Provider Department Appt Notes    In 1 month KULDEEP, PROCEDURE [facility] CLN w/MAC @ [facility]               Passed - AAP/ACT given in last 12 months     Yes  Yes  Yes  25

## 2024-03-20 ENCOUNTER — HOSPITAL ENCOUNTER (OUTPATIENT)
Age: 44
Discharge: HOME | End: 2024-03-20
Payer: COMMERCIAL

## 2024-03-20 ENCOUNTER — PATIENT MESSAGE (OUTPATIENT)
Dept: FAMILY MEDICINE CLINIC | Facility: CLINIC | Age: 44
End: 2024-03-20

## 2024-03-20 VITALS
SYSTOLIC BLOOD PRESSURE: 132 MMHG | RESPIRATION RATE: 20 BRPM | HEART RATE: 88 BPM | TEMPERATURE: 99 F | OXYGEN SATURATION: 99 % | DIASTOLIC BLOOD PRESSURE: 90 MMHG

## 2024-03-20 DIAGNOSIS — M54.12 CERVICAL RADICULOPATHY: Primary | ICD-10-CM

## 2024-03-20 RX ORDER — METHYLPREDNISOLONE 4 MG/1
TABLET ORAL
Qty: 1 EACH | Refills: 0 | Status: SHIPPED | OUTPATIENT
Start: 2024-03-20

## 2024-03-20 RX ORDER — NAPROXEN 500 MG/1
500 TABLET ORAL 2 TIMES DAILY PRN
Qty: 20 TABLET | Refills: 0 | Status: SHIPPED | OUTPATIENT
Start: 2024-03-20 | End: 2024-03-27

## 2024-03-20 RX ORDER — CYCLOBENZAPRINE HCL 10 MG
10 TABLET ORAL 3 TIMES DAILY PRN
Qty: 20 TABLET | Refills: 0 | Status: SHIPPED | OUTPATIENT
Start: 2024-03-20 | End: 2024-03-27

## 2024-03-20 RX ORDER — KETOROLAC TROMETHAMINE 30 MG/ML
60 INJECTION, SOLUTION INTRAMUSCULAR; INTRAVENOUS ONCE
Status: COMPLETED | OUTPATIENT
Start: 2024-03-20 | End: 2024-03-20

## 2024-03-20 NOTE — ED INITIAL ASSESSMENT (HPI)
Pt here with left arm pain that radiates to the neck, pt states symptoms started 1 week ago and pain is worse with sitting, pt denies any injury , sob or chest pain

## 2024-03-20 NOTE — ED PROVIDER NOTES
Patient Seen in: [facility]      History     Chief Complaint   Patient presents with    Neck Pain     Stated Complaint: NECK AND ARM PAIN    Subjective:   HPI    This is a well-appearing 43-year-old presents with left upper back pain radiating into his shoulder over the last 7 days. Worse with movement of the L shoulder. Intermittent numbness/tingling. Reports numbness/tingling improves if he moves the shoulder.  No injury that he recalls.  No headache or dizziness.    Objective:   Past Medical History:   Diagnosis Date    ADHD     Allergic rhinitis     Asthma (HCC)     Cats    Dyslipidemia     Eczema     Essential hypertension     High blood pressure     Hypertension Age 23              Past Surgical History:   Procedure Laterality Date    OTHER  Childhood    Bilateral myringotomy tubes    VASECTOMY                  Social History     Socioeconomic History    Marital status:    Occupational History    Occupation: Manager   Tobacco Use    Smoking status: Former     Packs/day: 1.00     Years: 6.00     Additional pack years: 0.00     Total pack years: 6.00     Types: Cigarettes     Quit date: 3/20/2005     Years since quittin.0     Passive exposure: Never    Smokeless tobacco: Current   Vaping Use    Vaping Use: Some days   Substance and Sexual Activity    Alcohol use: Yes     Comment: Occasional beer    Drug use: Yes     Types: Cannabis     Comment: vape   Other Topics Concern    Caffeine Concern Yes     Comment: coffee              Review of Systems   Musculoskeletal:  Positive for neck pain.   All other systems reviewed and are negative.      Positive for stated complaint: NECK AND ARM PAIN  Other systems are as noted in HPI.  Constitutional and vital signs reviewed.      All other systems reviewed and negative except as noted above.    Physical Exam     ED Triage Vitals [24 1651]   /90   Pulse 103   Resp 20   Temp 98.8 °F (37.1 °C)   Temp src Temporal   SpO2 99 %   O2 Device  None (Room air)       Current:/90   Pulse 88   Temp 98.8 °F (37.1 °C) (Temporal)   Resp 20   SpO2 99%         Physical Exam  Vitals and nursing note reviewed.   Constitutional:       General: He is awake. He is not in acute distress.     Appearance: Normal appearance. He is not ill-appearing, toxic-appearing or diaphoretic.   HENT:      Head: Normocephalic and atraumatic.      Right Ear: Tympanic membrane, ear canal and external ear normal.      Left Ear: Tympanic membrane, ear canal and external ear normal.      Nose: Nose normal.      Mouth/Throat:      Mouth: Mucous membranes are moist.      Pharynx: Oropharynx is clear. Uvula midline.   Eyes:      General: Lids are normal.      Extraocular Movements: Extraocular movements intact.      Conjunctiva/sclera: Conjunctivae normal.      Pupils: Pupils are equal, round, and reactive to light.   Cardiovascular:      Rate and Rhythm: Normal rate and regular rhythm.      Pulses: Normal pulses.      Heart sounds: Normal heart sounds.   Pulmonary:      Effort: Pulmonary effort is normal.      Breath sounds: Normal breath sounds and air entry. No stridor, decreased air movement or transmitted upper airway sounds.   Musculoskeletal:      Cervical back: Tenderness present.        Back:       Comments: +upper back tenderness. Worse with palpation and movement of the L shoulder. +soft compartments.    Skin:     General: Skin is warm and dry.      Capillary Refill: Capillary refill takes less than 2 seconds.   Neurological:      General: No focal deficit present.      Mental Status: He is alert and oriented to person, place, and time.      Cranial Nerves: Cranial nerves 2-12 are intact.      Sensory: Sensation is intact.      Motor: Motor function is intact.      Coordination: Coordination is intact.      Gait: Gait is intact.      Comments: Normal strength and sensation in upper extremities   Psychiatric:         Mood and Affect: Mood normal.         Behavior: Behavior  normal. Behavior is cooperative.         Thought Content: Thought content normal.         Judgment: Judgment normal.       ED Course   Labs Reviewed - No data to display  Toradol, lidocaine patch   MDM       Medical Decision Making  Patient is well-appearing on exam, nontoxic in appearance, exam as noted above.  Differential diagnoses including but not limited to arthralgia, cervical radiculopathy, ACS.  This is reproducible pain.  Examination symptoms consistent with cervical radiculopathy.  I have given him Dr. Mena to follow-up with.  Also given him a prescription for prednisone, Flexeril and naproxen.  Extremity neurovascular intact at discharge.  Patient verbalized plan of care and stated understanding.    Amount and/or Complexity of Data Reviewed  ECG/medicine tests: ordered and independent interpretation performed. Decision-making details documented in ED Course.    Risk  Prescription drug management.        Disposition and Plan     Clinical Impression:  1. Cervical radiculopathy         Disposition:  Discharge  3/20/2024  5:21 pm    Follow-up:  Weiler, Colleen M, DO  [address]  [place] [place] [facility] [address]  [phone]          Cruzito Mena MD  [address]  [place] [address]  [phone]                Medications Prescribed:  Discharge Medication List as of 3/20/2024  5:22 PM        START taking these medications    Details   cyclobenzaprine 10 MG Oral Tab Take 1 tablet (10 mg total) by mouth 3 (three) times daily as needed for Muscle spasms., Normal, Disp-20 tablet, R-0      methylPREDNISolone (MEDROL) 4 MG Oral Tablet Therapy Pack Dosepack: take as directed, Normal, Disp-1 each, R-0      naproxen 500 MG Oral Tab Take 1 tablet (500 mg total) by mouth 2 (two) times daily as needed., Normal, Disp-20 tablet, R-0

## 2024-03-20 NOTE — DISCHARGE INSTRUCTIONS
Please take medication as prescribed.  Close follow-up is recommended.  Contact information has been provided in your discharge paperwork.

## 2024-03-21 NOTE — TELEPHONE ENCOUNTER
Please see patient E-mail and advise.   Referral pended.    Disposition and Plan    Clinical Impression:  1. Cervical radiculopathy        Disposition:  Discharge  3/20/2024  5:21 pm     Follow-up:  Weiler, Colleen M, DO  [address] [facility] [address]  [facility] [address]  [phone]     Cruzito Mena MD  [address] [facility] [address]  [facility] [address]  [phone]     Medications Prescribed:  Discharge Medication List as of 3/20/2024  5:22 PM              START taking these medications     Details   cyclobenzaprine 10 MG Oral Tab Take 1 tablet (10 mg total) by mouth 3 (three) times daily as needed for Muscle spasms., Normal, Disp-20 tablet, R-0       methylPREDNISolone (MEDROL) 4 MG Oral Tablet Therapy Pack Dosepack: take as directed, Normal, Disp-1 each, R-0       naproxen 500 MG Oral Tab Take 1 tablet (500 mg total) by mouth 2 (two) times daily as needed., Normal, Disp-20 tablet, R-0                       Electronically signed by Neris Benitez APRN at 3/20/2024  6:16 PM

## 2024-03-28 ENCOUNTER — HOSPITAL ENCOUNTER (EMERGENCY)
Facility: HOSPITAL | Age: 44
Discharge: HOME | End: 2024-03-28
Attending: EMERGENCY MEDICINE
Payer: COMMERCIAL

## 2024-03-28 ENCOUNTER — HOSPITAL ENCOUNTER (OUTPATIENT)
Age: 44
Discharge: HOME | End: 2024-03-28
Payer: COMMERCIAL

## 2024-03-28 ENCOUNTER — PATIENT MESSAGE (OUTPATIENT)
Dept: FAMILY MEDICINE CLINIC | Facility: CLINIC | Age: 44
End: 2024-03-28

## 2024-03-28 ENCOUNTER — TELEPHONE (OUTPATIENT)
Dept: FAMILY MEDICINE CLINIC | Facility: CLINIC | Age: 44
End: 2024-03-28

## 2024-03-28 ENCOUNTER — APPOINTMENT (OUTPATIENT)
Dept: GENERAL RADIOLOGY | Age: 44
End: 2024-03-28
Attending: NURSE PRACTITIONER
Payer: COMMERCIAL

## 2024-03-28 VITALS
HEART RATE: 98 BPM | DIASTOLIC BLOOD PRESSURE: 63 MMHG | SYSTOLIC BLOOD PRESSURE: 131 MMHG | TEMPERATURE: 98 F | OXYGEN SATURATION: 96 % | RESPIRATION RATE: 22 BRPM

## 2024-03-28 VITALS
SYSTOLIC BLOOD PRESSURE: 130 MMHG | DIASTOLIC BLOOD PRESSURE: 88 MMHG | TEMPERATURE: 98 F | OXYGEN SATURATION: 99 % | HEART RATE: 90 BPM | RESPIRATION RATE: 18 BRPM

## 2024-03-28 DIAGNOSIS — M62.838 CERVICAL PARASPINAL MUSCLE SPASM: ICD-10-CM

## 2024-03-28 DIAGNOSIS — M54.2 NECK PAIN: Primary | ICD-10-CM

## 2024-03-28 DIAGNOSIS — M47.812 SPONDYLOSIS OF CERVICAL SPINE: ICD-10-CM

## 2024-03-28 DIAGNOSIS — M54.12 CERVICAL RADICULOPATHY: Primary | ICD-10-CM

## 2024-03-28 PROCEDURE — 99283 EMERGENCY DEPT VISIT LOW MDM: CPT

## 2024-03-28 PROCEDURE — 72040 X-RAY EXAM NECK SPINE 2-3 VW: CPT | Performed by: NURSE PRACTITIONER

## 2024-03-28 PROCEDURE — 96372 THER/PROPH/DIAG INJ SC/IM: CPT | Performed by: NURSE PRACTITIONER

## 2024-03-28 PROCEDURE — 99284 EMERGENCY DEPT VISIT MOD MDM: CPT

## 2024-03-28 PROCEDURE — 99213 OFFICE O/P EST LOW 20 MIN: CPT | Performed by: NURSE PRACTITIONER

## 2024-03-28 RX ORDER — GABAPENTIN 100 MG/1
100 CAPSULE ORAL NIGHTLY
Qty: 30 CAPSULE | Refills: 1 | Status: SHIPPED | OUTPATIENT
Start: 2024-03-28

## 2024-03-28 RX ORDER — DIAZEPAM 5 MG/1
5 TABLET ORAL 3 TIMES DAILY PRN
Qty: 20 TABLET | Refills: 0 | Status: SHIPPED | OUTPATIENT
Start: 2024-03-28 | End: 2024-04-01

## 2024-03-28 RX ORDER — CYCLOBENZAPRINE HCL 10 MG
10 TABLET ORAL 3 TIMES DAILY PRN
Qty: 30 TABLET | Refills: 0 | Status: SHIPPED | OUTPATIENT
Start: 2024-03-28

## 2024-03-28 RX ORDER — NAPROXEN 500 MG/1
500 TABLET ORAL 2 TIMES DAILY PRN
Qty: 20 TABLET | Refills: 0 | Status: SHIPPED | OUTPATIENT
Start: 2024-03-28

## 2024-03-28 RX ORDER — KETOROLAC TROMETHAMINE 30 MG/ML
30 INJECTION, SOLUTION INTRAMUSCULAR; INTRAVENOUS ONCE
Status: COMPLETED | OUTPATIENT
Start: 2024-03-28 | End: 2024-03-28

## 2024-03-28 RX ORDER — METHYLPREDNISOLONE 4 MG/1
TABLET ORAL
Qty: 1 EACH | Refills: 0 | Status: SHIPPED | OUTPATIENT
Start: 2024-03-28

## 2024-03-28 RX ORDER — HYDROCODONE BITARTRATE AND ACETAMINOPHEN 5; 325 MG/1; MG/1
1-2 TABLET ORAL EVERY 4 HOURS PRN
Qty: 12 TABLET | Refills: 0 | Status: SHIPPED | OUTPATIENT
Start: 2024-03-28 | End: 2024-03-31

## 2024-03-28 NOTE — ED PROVIDER NOTES
Patient Seen in: [facility] Emergency Department      History     Chief Complaint   Patient presents with    Neck Pain     Stated Complaint: neck pain    Subjective:   HPI    Patient is a 43-year-old male that complains of left-sided trapezius pain with pain that shoots down his left arm.  He has some tingling in his left thumb.  He was seen in immediate care on  for similar symptoms.  He was prescribed a Medrol Dosepak his symptoms calm down.  This morning after finishing the steroids the pain returned again.  He was seen in immediate care again this morning and received a shot of Toradol and a lidocaine pain patch.  He states he still miserable.  He has a follow-up appoint with Dr. Mena on the .  No weakness.  No known trauma.  He did have x-rays done today    Objective:   Past Medical History:   Diagnosis Date    ADHD     Allergic rhinitis     Asthma (HCC)     Cats    Dyslipidemia     Eczema     Essential hypertension     High blood pressure     Hypertension Age 23              Past Surgical History:   Procedure Laterality Date    OTHER  Childhood    Bilateral myringotomy tubes    VASECTOMY                  Social History     Socioeconomic History    Marital status:    Occupational History    Occupation: Manager   Tobacco Use    Smoking status: Former     Packs/day: 1.00     Years: 6.00     Additional pack years: 0.00     Total pack years: 6.00     Types: Cigarettes     Quit date: 3/20/2005     Years since quittin.0     Passive exposure: Never    Smokeless tobacco: Current   Vaping Use    Vaping Use: Some days   Substance and Sexual Activity    Alcohol use: Yes     Comment: Occasional beer    Drug use: Yes     Types: Cannabis     Comment: vape   Other Topics Concern    Caffeine Concern Yes     Comment: coffee              Review of Systems    Positive for stated complaint: neck pain  Other systems are as noted in HPI.  Constitutional and vital signs reviewed.      All other  systems reviewed and negative except as noted above.    Physical Exam     ED Triage Vitals [03/28/24 1635]   /90   Pulse (!) 125   Resp 22   Temp 97.9 °F (36.6 °C)   Temp src Oral   SpO2 100 %   O2 Device None (Room air)       Current:/90   Pulse (!) 125   Temp 97.9 °F (36.6 °C) (Oral)   Resp 22   SpO2 100%         Physical Exam    Constitutional: Oriented to person, place, and time. Appears well-developed and well-nourished.   HEENT:   Head: Normocephalic and atraumatic.   Right Ear: External ear normal.   Left Ear: External ear normal.   Nose: Nose normal.   Mouth/Throat: Oropharynx is clear and moist.   Eyes: Conjunctivae and EOM are normal. Pupils are equal, round, and reactive to light.   Neck: There is no midline tenderness there is left-sided trapezius muscle tenderness and palpable spasm  Cardiovascular: Normal rate, regular rhythm, normal heart sounds and intact distal pulses.    Pulmonary/Chest: Effort normal and breath sounds normal. No respiratory distress.   Abdominal: Soft. Bowel sounds are normal. Exhibits no distension and no mass. There is no tenderness. There is no rebound and no guarding.   Musculoskeletal: Normal range of motion. Exhibits no edema or tenderness.   Lymphadenopathy: No cervical adenopathy.   Neurological: Alert and oriented to person, place, and time. Normal reflexes. No cranial nerve deficit. No motor os sensory defecits noted Coordination normal.   Skin: Skin is warm and dry.   Psychiatric: Normal mood and affect. Behavior is normal. Judgment and thought content normal.   Nursing note and vitals reviewed.        ED Course   Labs Reviewed - No data to display          XR CERVICAL SPINE (2-3 VIEWS) (CPT=72040)    Result Date: 3/28/2024  CONCLUSION:  1. No acute appearing fracture, subluxation or prevertebral soft tissue swelling.  Stable mild spondylosis at C5-C6.  Reversal of the normal cervical lordosis may suggest spasm.  Correlate clinically.    Dictated by  (CST): Kobi Borden MD on 3/28/2024 at 9:24 AM     Finalized by (CST): Kobi Borden MD on 3/28/2024 at 9:27 AM                  MDM      Use of independent historian:     I personally reviewed and interpreted the images :     XR CERVICAL SPINE (2-3 VIEWS) (CPT=72040)    Result Date: 3/28/2024  CONCLUSION:  1. No acute appearing fracture, subluxation or prevertebral soft tissue swelling.  Stable mild spondylosis at C5-C6.  Reversal of the normal cervical lordosis may suggest spasm.  Correlate clinically.    Dictated by (CST): Kobi Borden MD on 3/28/2024 at 9:24 AM     Finalized by (CST): Kobi Borden MD on 3/28/2024 at 9:27 AM           Vitals:    03/28/24 1635   BP: 157/90   Pulse: (!) 125   Resp: 22   Temp: 97.9 °F (36.6 °C)   TempSrc: Oral   SpO2: 100%     *I personally reviewed and interpreted all ED vitals.    Pulse Ox: 100%, Room air, Normal         Differential Diagnosis/ Diagnostic Considerations: Patient with cervical radiculopathy and pain.  No weakness appreciated on exam.  Will prescribe Medrol Dosepak again.  Will prescribe short course of Norco for pain relief.  Will encourage him to follow-up with his doctor.    Medical Record Review: I personally reviewed available prior medical records for any recent pertinent discharge summaries, testing, and procedures and reviewed those reports and found immediate care visit 7/3/2020 and this morning reviewed..    Complicating Factors: The patient already has asthma and which contribute to the complexity of this ED evaluation.    Social determinants of health:    Prescription drug management:      Shared Decision Making:    ED Course: Patient agrees with plan of Medrol Dosepak Norco and Valium as needed and close outpatient follow-up    Discussion of management with other healthcare providers:    Condition upon leaving the department: Stable                                     Medical Decision Making      Disposition and Plan     Clinical Impression:  1.  Cervical radiculopathy         Disposition:  Discharge  3/28/2024  5:10 pm    Follow-up:  Weiler, Colleen M, DO  [address] [facility]  [address] [place] [address]  [phone]    Follow up in 2 day(s)            Medications Prescribed:  Current Discharge Medication List        START taking these medications    Details   !! methylPREDNISolone (MEDROL) 4 MG Oral Tablet Therapy Pack Dosepack: take as directed  Qty: 1 each, Refills: 0      diazePAM 5 MG Oral Tab Take 1 tablet (5 mg total) by mouth 3 (three) times daily as needed (pain/spasm).  Qty: 20 tablet, Refills: 0    Associated Diagnoses: Cervical radiculopathy      HYDROcodone-acetaminophen 5-325 MG Oral Tab Take 1-2 tablets by mouth every 4 (four) hours as needed for Pain.  Qty: 12 tablet, Refills: 0    Associated Diagnoses: Cervical radiculopathy       !! - Potential duplicate medications found. Please discuss with provider.

## 2024-03-28 NOTE — TELEPHONE ENCOUNTER
Patient woke up with neck pain and went to urgent care. He was also seen for the same thing on 3/20/24. Today he received a pain shot, naproxen and muscle relaxer and states that his pain is still the same. He does have an appointment with Dr. Mena next month but would like to see what he can do in the mean time as his pain is not controled. I offered him an appointment with one of our providers and also said if pain is worse to go to ER. Patient states \"I've been seen by 13 doctors in the last couple of days, that's helpful\" and hung up on on RN.     Future Appointments   Date Time Provider Department Center   4/10/2024  1:00 PM Radha Soliz LCSW [facility] [place]   4/17/2024  9:00 AM Cruzito Mena MD [facility]   4/24/2024  1:00 PM Radha Soliz LCSW [facility] [place]   5/7/2024  2:00 PM KULDEEP, PROCEDURE [id number] None         Routing as FYI.

## 2024-03-28 NOTE — ED INITIAL ASSESSMENT (HPI)
Patient recently seen at [place] for cervical radiculopathy . Has attempted prednisone, muscle relaxers, and pain medication as well as heat pack . Patient states he was feeling a litter better but then this morning was stretching and began having tingling again on top left arm with certain movements and neck pain. Patient states he took naproxen this morning but states he is still in a lot of pain. Has not seen physiatry yet.

## 2024-03-28 NOTE — ED PROVIDER NOTES
Patient Seen in: [facility]      History     Chief Complaint   Patient presents with    Neck Pain     Stated Complaint: back pain    Subjective:   HPI    43 yr old male here for evaluation of mid to left upper back and neck pain that started back again this morning when stretching. HE reports feeling tingling down left arm again with certain positions, movement. He denies headache, dizziness, vision changes, low back pain. He has not had a fall, trauma or any injury he can remember prior to pain starting. He was seen at [facility] for similar symptoms 3/202/24 and finished Medrol dose pack and Flexeril with relief until today. He took a naproxen this morning. HE denies known cervical injury or problem from the past. Did not take his BP meds this morning.     Objective:   Past Medical History:   Diagnosis Date    ADHD     Allergic rhinitis     Asthma (HCC)     Cats    Dyslipidemia     Eczema     Essential hypertension     High blood pressure     Hypertension Age 23              Past Surgical History:   Procedure Laterality Date    OTHER  Childhood    Bilateral myringotomy tubes    VASECTOMY                  Social History     Socioeconomic History    Marital status:    Occupational History    Occupation: Manager   Tobacco Use    Smoking status: Former     Packs/day: 1.00     Years: 6.00     Additional pack years: 0.00     Total pack years: 6.00     Types: Cigarettes     Quit date: 3/20/2005     Years since quittin.0     Passive exposure: Never    Smokeless tobacco: Current   Vaping Use    Vaping Use: Some days   Substance and Sexual Activity    Alcohol use: Yes     Comment: Occasional beer    Drug use: Yes     Types: Cannabis     Comment: vape   Other Topics Concern    Caffeine Concern Yes     Comment: coffee              Review of Systems    Positive for stated complaint: back pain  Other systems are as noted in HPI.  Constitutional and vital signs reviewed.      All other systems  reviewed and negative except as noted above.    Physical Exam     ED Triage Vitals [03/28/24 0852]   BP (!) 141/91   Pulse 106   Resp 18   Temp 98.3 °F (36.8 °C)   Temp src Temporal   SpO2 99 %   O2 Device None (Room air)       Current:/88   Pulse 90   Temp 97.5 °F (36.4 °C) (Temporal)   Resp 18   SpO2 99%         Physical Exam  Vitals and nursing note reviewed.   Constitutional:       General: He is not in acute distress.     Appearance: Normal appearance. He is not ill-appearing or toxic-appearing.   HENT:      Mouth/Throat:      Mouth: Mucous membranes are moist.   Eyes:      Pupils: Pupils are equal, round, and reactive to light.   Cardiovascular:      Rate and Rhythm: Normal rate.      Pulses: Normal pulses.   Pulmonary:      Effort: Pulmonary effort is normal. No respiratory distress.   Musculoskeletal:      Cervical back: Neck supple. Tenderness present. No edema, erythema, signs of trauma, rigidity or crepitus. Pain with movement, spinous process tenderness and muscular tenderness present. Decreased range of motion.   Lymphadenopathy:      Cervical: No cervical adenopathy.   Skin:     General: Skin is warm.   Neurological:      Mental Status: He is alert and oriented to person, place, and time.   Psychiatric:         Mood and Affect: Mood normal.         Behavior: Behavior normal.               ED Course   Labs Reviewed - No data to display                   MDM     43 yr old male here for evaluation of neck and upper back pain radiating down left arm that started up again today.    ON exam, pt in mild discomfort w movement. Good equal  5/5 BUE. TTP To c4-6 area of spine and left paraspinal, trapezius muscles. Radial and brachial pulse normal in left arm. Pt able to raise arm overhead with no increased pain. Reports better actually this way.    Diff dx: cervical disc bulge vs strain vs radiculopathy    Will obtain XR as pt has cervical spine tenderness.  XR= I do not see any obvious fracture  noted.  Rad read- spondylosis c5-6, djd. +spasm  NO fracture    Discussed results, plan.  Toradol IM, lidocaine patch given.    Advised on muscle strain vs radiculopathy related to spondylosis.  PT agree with muscle relaxer, NSAID, topical relief. Will defer another steroid RX today. Pt agrees with this.  Has appt with DR. Mena 2nd week in April.    All questions answered. Return and ER precautions given.    Counseled: Patient, regarding diagnosis, regarding treatment plan, regarding diagnostic results, regarding prescription, I have discussed with the patient the results of tests, differential diagnosis, and warning signs and symptoms that should prompt immediate return. The patient understands these instructions and agrees to the follow-up plan provided. There is no barriers to learning. Appropriate f/u given. Patient agrees to return for any concerns/ problems/complications.                                       Medical Decision Making      Disposition and Plan     Clinical Impression:  1. Neck pain    2. Spondylosis of cervical spine    3. Cervical paraspinal muscle spasm         Disposition:  Discharge  3/28/2024  9:34 am    Follow-up:  Cruzito Mena MD  [address] [address]  [phone]      physical medicine    Jimenez Wong MD  [address] [place] [address]  [phone]      ortho spine          Medications Prescribed:  Discharge Medication List as of 3/28/2024  9:44 AM

## 2024-03-28 NOTE — DISCHARGE INSTRUCTIONS
Follow up with your doctor you made appt with. They will determine if further imaging or physical therapy etc would be needed.    Take flexeril 10 mg two to three times a day for muscle relaxer/pain. DO NOT TAKE IF working, driving or drinking alcohol. This can make you sleepy or dizzy. Take 1 tablet at night before bedtime if active during the day.    Take naproxen two times a day for pain and inflammation.    Use lidocaine patch OR icy hot gel over the counter to area of pain.  Use heating pad to area of pain to help with muscle relaxation/spasms when not using topical pain control.    Increase water hydration, rest. No heavy lifting while muscle recover.    RETURN OR GO TO ED for worsening pain, dizziness, chest pain or shortness of breath, incontinence, numbness around pelvis

## 2024-03-28 NOTE — ED INITIAL ASSESSMENT (HPI)
Pt to ED via triage c/o neck pain x 2 weeks was dx with cervical radiculopathy last week. Says today the pain increased, went to [facility] and got steroids and muscle relaxer but no relief. Rip of left thumb has been numb x 5 hours. Full neck ROM noted in triage. -BEFAST in triage

## 2024-03-29 ENCOUNTER — PATIENT OUTREACH (OUTPATIENT)
Dept: CASE MANAGEMENT | Age: 44
End: 2024-03-29

## 2024-03-29 NOTE — PROGRESS NOTES
1st attempt ER f/up apt request    Colleen Weiler  PCP  [phone]  Mychart video visit  Apt: April 8 @11:15am     No answer, LVM w/ apt info  Closing encounter

## 2024-03-29 NOTE — TELEPHONE ENCOUNTER
Dr Weiler, patient called this morning.  Asking if you can order an MRI for his neck pain?  Please advise, he feels he has seen multiple doctors already.  See his [employer] message.

## 2024-04-01 ENCOUNTER — TELEPHONE (OUTPATIENT)
Facility: CLINIC | Age: 44
End: 2024-04-01

## 2024-04-01 ENCOUNTER — TELEMEDICINE (OUTPATIENT)
Dept: FAMILY MEDICINE CLINIC | Facility: CLINIC | Age: 44
End: 2024-04-01
Payer: COMMERCIAL

## 2024-04-01 ENCOUNTER — HOSPITAL ENCOUNTER (OUTPATIENT)
Dept: MRI IMAGING | Age: 44
Discharge: HOME | End: 2024-04-01
Attending: FAMILY MEDICINE
Payer: COMMERCIAL

## 2024-04-01 ENCOUNTER — NURSE TRIAGE (OUTPATIENT)
Dept: FAMILY MEDICINE CLINIC | Facility: CLINIC | Age: 44
End: 2024-04-01

## 2024-04-01 ENCOUNTER — TELEPHONE (OUTPATIENT)
Dept: FAMILY MEDICINE CLINIC | Facility: CLINIC | Age: 44
End: 2024-04-01

## 2024-04-01 DIAGNOSIS — M54.12 CERVICAL RADICULOPATHY: ICD-10-CM

## 2024-04-01 DIAGNOSIS — M54.2 NECK PAIN: ICD-10-CM

## 2024-04-01 DIAGNOSIS — M54.2 NECK PAIN: Primary | ICD-10-CM

## 2024-04-01 RX ORDER — HYDROCODONE BITARTRATE AND ACETAMINOPHEN 5; 325 MG/1; MG/1
1 TABLET ORAL EVERY 4 HOURS PRN
Qty: 30 TABLET | Refills: 0 | Status: SHIPPED | OUTPATIENT
Start: 2024-04-01

## 2024-04-01 RX ORDER — DIAZEPAM 5 MG/1
5 TABLET ORAL 3 TIMES DAILY PRN
Qty: 30 TABLET | Refills: 0 | Status: SHIPPED | OUTPATIENT
Start: 2024-04-01

## 2024-04-01 NOTE — PROGRESS NOTES
HPI:    Hussein Davila is a 43 year old male presents for video visit for follow-up regarding neck pain/cervical radiculopathy.  This morning, patient was unable to complete MRI due to severe neck pain.  He was unable to lay flat.  1 hour prior to study patient took cyclobenzaprine, gabapentin, hydrocodone, and diazepam.  States that with all of this, pain was still not adequately well-controlled.  Unsure how to complete the study.      HISTORY:  Past Medical History:   Diagnosis Date    ADHD     Allergic rhinitis     Asthma (HCC)     Cats    Dyslipidemia     Eczema     Essential hypertension     High blood pressure     Hypertension Age 23      Past Surgical History:   Procedure Laterality Date    OTHER  Childhood    Bilateral myringotomy tubes    VASECTOMY        Family History   Problem Relation Age of Onset    Asthma Mother     Other (Other) Mother         asthma    Diabetes Father     Hypertension Father     Other (CVA) Father         Age 70s    Diabetes Brother     Colon Cancer Paternal Grandmother     Heart Disease Maternal Grandfather          age 60s      Social History:   Social History     Socioeconomic History    Marital status:    Occupational History    Occupation: Manager   Tobacco Use    Smoking status: Former     Packs/day: 1.00     Years: 6.00     Additional pack years: 0.00     Total pack years: 6.00     Types: Cigarettes     Quit date: 3/20/2005     Years since quittin.0     Passive exposure: Never    Smokeless tobacco: Current   Vaping Use    Vaping Use: Some days   Substance and Sexual Activity    Alcohol use: Yes     Comment: Occasional beer    Drug use: Yes     Types: Cannabis     Comment: vape   Other Topics Concern    Caffeine Concern Yes     Comment: coffee        Medications (Active prior to today's visit):  Current Outpatient Medications   Medication Sig Dispense Refill    diazePAM 5 MG Oral Tab Take 1 tablet (5 mg total) by mouth 3 (three) times daily as  needed (pain/spasm). 30 tablet 0    HYDROcodone-acetaminophen 5-325 MG Oral Tab Take 1 tablet by mouth every 4 (four) hours as needed for Pain. 30 tablet 0    gabapentin 100 MG Oral Cap Take 1 capsule (100 mg total) by mouth nightly. 30 capsule 1    cyclobenzaprine 10 MG Oral Tab Take 1 tablet (10 mg total) by mouth 3 (three) times daily as needed for Muscle spasms. 30 tablet 0    naproxen 500 MG Oral Tab Take 1 tablet (500 mg total) by mouth 2 (two) times daily as needed. 20 tablet 0    methylPREDNISolone (MEDROL) 4 MG Oral Tablet Therapy Pack Dosepack: take as directed 1 each 0    methylPREDNISolone (MEDROL) 4 MG Oral Tablet Therapy Pack Dosepack: take as directed 1 each 0    montelukast 10 MG Oral Tab Take 1 tablet (10 mg total) by mouth nightly. 90 tablet 3    amLODIPine 10 MG Oral Tab Take 1 tablet (10 mg total) by mouth once daily. 90 tablet 3    polyethylene glycol, PEG 3350-KCl-NaBcb-NaCl-NaSulf, 236 g Oral Recon Soln Please take as directed per the written instructions from the GI office 4000 mL 0    Cholecalciferol (VITAMIN D3) 25 MCG (1000 UT) Oral Cap Take 1 tablet by mouth daily.      cetirizine 10 MG Oral Tab Take 1 tablet (10 mg total) by mouth daily. 90 tablet 3    busPIRone 15 MG Oral Tab Take 1 tablet (15 mg total) by mouth 2 (two) times daily.      Omeprazole 40 MG Oral Capsule Delayed Release Take 1 capsule (40 mg total) by mouth daily. 90 capsule 3    Mometasone Furoate (ELOCON) 0.1 % External Cream Apply 1 Application topically 2 (two) times daily as needed. 50 g 1    lisinopril-hydroCHLOROthiazide 10-12.5 MG Oral Tab Take 1 tablet by mouth daily. 90 tablet 3    Multiple Vitamins-Minerals (MENS MULTI VITAMIN & MINERAL) Oral Tab Take by mouth.         Allergies:  Allergies   Allergen Reactions    Crab (Diagnostic) HIVES         Depression Screening (PHQ-2/PHQ-9): Over the LAST 2 WEEKS                         ROS:   Review of Systems   All other systems reviewed and are negative.      PHYSICAL  EXAM:   There were no vitals filed for this visit.  Physical Exam  Constitutional:       General: He is not in acute distress.  Pulmonary:      Effort: No respiratory distress.   Neurological:      Mental Status: He is alert.         ASSESSMENT/PLAN:   (M54.2) Neck pain  (primary encounter diagnosis)  (M54.12) Cervical radiculopathy  Plan: diazePAM 5 MG Oral Tab,         HYDROcodone-acetaminophen 5-325 MG Oral Tab,         MRI SPINE CERVICAL (CPT=72141)  -Patient was told that there was an MRI machine that did not require to lie flat, we called radiology and confirmed that we do not have this machine available.  Will need to complete study under sedation, order placed.  Patient requesting refills of hydrocortisone and diazepam, prescriptions to pharmacy.  Follow-up as needed.      I conducted a telehealth visit with the above named patient, which was completed using two-way, real-time interactive audio and video communication. This has been done in [place] to provide continuity of care in the best interest of the provider-patient relationship, due to the COVID -19 public health crisis/national emergency where restrictions of face-to-face office visits are ongoing. Every conscious effort was taken to allow for sufficient and adequate time to complete the visit.  The patient was made aware of the limitations of the telehealth visit, including treatment limitations as no physical exam could be performed.  The patient was advised to call 911 or to go to the ER in case there was an emergency.  The patient was also advised of the potential privacy & security concerns related to the telehealth platform.   The patient was made aware of where to find [facility]'s notice of privacy practices, telehealth consent form and other related consent forms and documents.  which are located on the [facility] website. The patient verbally agreed to telehealth consent form, related consents and the risks discussed.    Lastly, the patient confirmed  that they were in [place].   Included in this visit, time may have been spent reviewing labs, medications, radiology tests and decision making. Appropriate medical decision-making and tests are ordered as detailed in the plan of care above.  Coding/billing information is submitted for this visit based on complexity of care and/or time spent for the visit.             Responsible party/patient verbalized understanding of information discussed. No barriers to learning observed.            Orders This Visit:  No orders of the defined types were placed in this encounter.      Meds This Visit:  Requested Prescriptions     Signed Prescriptions Disp Refills    diazePAM 5 MG Oral Tab 30 tablet 0     Sig: Take 1 tablet (5 mg total) by mouth 3 (three) times daily as needed (pain/spasm).    HYDROcodone-acetaminophen 5-325 MG Oral Tab 30 tablet 0     Sig: Take 1 tablet by mouth every 4 (four) hours as needed for Pain.       Imaging & Referrals:  MRI SPINE CERVICAL (CPT=72141)     More than 30 minutes was spent on the care of this patient and >15 minutes was spent on counseling and coordination of care.       Chaperone offered at visit today.     The 21st Century cures Act makes medical notes like these available to patients in the interest of transparency.  However, be advised that this is a medical document.  It is intended as peer to peer communication.  It is written in medical language and may contain abbreviations or verbiage that are unfamiliar.  It may appear blunt or direct.  Medical documents are intended to carry relevant information, facts as evident, and the clinical opinion of the practitioner.      This note was created by [employer] voice recognition. Errors in content may be related to improper recognition by the system; efforts to review and correct have been done but errors may still exist. Please contact me with any questions.       4/1/2024  Royal Hernández MD

## 2024-04-01 NOTE — TELEPHONE ENCOUNTER
Per Dr. Hernández, patient had too much pain to lay flat in MRI today.  Patient wondering if there is a machine where you do not have to lay flat.  Dr. Hernández also wondering if IV sedation will be necessary.      Discussed with radiology.  There is no machine at [facility] where patient does not need to lay flat.  They recommend IV sedation.  Needs to be indicated on order and scheduled through central scheduling.      Dr. Hernández advised and placed MRI order with IV sedation.    Called patient, confirmed name and . Patient advised that MRI will need to be done with sedation.  Advised patient to call Central Scheduling to schedule.  Patient verbalized understanding.  Advised patient to call with any questions or problems.

## 2024-04-01 NOTE — TELEPHONE ENCOUNTER
Action Requested: Summary for Provider     []  Critical Lab, Recommendations Needed  [] Need Additional Advice  []   FYI    []   Need Orders  [] Need Medications Sent to Pharmacy  []  Other     SUMMARY: pt requesting different pain med- unable to [place] MRI due pain/could not lay flat,, stated Norco did not help seeking another courtney med, video visit made to discuss     Reason for call: neck pain  Onset: chronic                    Reason for Disposition   Patient wants to be seen    Protocols used: Neck Pain or [id number]

## 2024-04-01 NOTE — TELEPHONE ENCOUNTER
1st,overdue reminder letter mailed out to patient   Imaging order   Orders Placed on 2/7/24    XR UGI/ESOPHAGUS DOUBLE CONTRAST (CPT=74246)

## 2024-04-01 NOTE — TELEPHONE ENCOUNTER
Pt called back, earlier video  appt made, MRI at 1:30, not sure how to cope when he can't lie flat/still

## 2024-04-02 ENCOUNTER — NURSE TRIAGE (OUTPATIENT)
Dept: FAMILY MEDICINE CLINIC | Facility: CLINIC | Age: 44
End: 2024-04-02

## 2024-04-02 DIAGNOSIS — M54.2 NECK PAIN: Primary | ICD-10-CM

## 2024-04-02 RX ORDER — GABAPENTIN 300 MG/1
300 CAPSULE ORAL 3 TIMES DAILY
Qty: 90 CAPSULE | Refills: 0 | Status: SHIPPED | OUTPATIENT
Start: 2024-04-02

## 2024-04-02 NOTE — TELEPHONE ENCOUNTER
I was unsure why the patient was put on my schedule yesterday, was just helping out. Forwarding to PCP

## 2024-04-02 NOTE — TELEPHONE ENCOUNTER
Taking Naproxen, Hydrocodone and Gabapentin 100mg TID.     Started 3 weeks ago and then woke up with extreme pain last Wednesday after stretching.  Has been unable to work.     Will increase Gabapentin 300mg TID.   Can we try to push up date of MRI? I can order STAT

## 2024-04-02 NOTE — TELEPHONE ENCOUNTER
"Medical Week 2 Survey    Flowsheet Row Responses   [facility] patient discharged from? [place]   Does the patient have one of the following disease processes/diagnoses(primary or secondary)? Other   Week 2 attempt successful? Yes   Call start time 1408   Call end time 1412   Meds reviewed with patient/caregiver? Yes   Is the patient having any side effects they believe may be caused by any medication additions or changes? No   Does the patient have all medications ordered at discharge? Yes   Is the patient taking all medications as directed (includes completed medication regime)? Yes   Medication comments States was started on \"stomach medicine\" today after colonoscopy.   Comments regarding appointments States had colonoscopy today and dx with hernia.   Does the patient have a primary care provider?  Yes   Does the patient have an appointment with their PCP within 7 days of discharge? Yes   Has the patient kept scheduled appointments due by today? Yes   Has home health visited the patient within 72 hours of discharge? N/A   Psychosocial issues? No   Did the patient receive a copy of their discharge instructions? Yes   Nursing interventions Reviewed instructions with patient   What is the patient's perception of their health status since discharge? Improving   Is the patient/caregiver able to teach back signs and symptoms related to disease process for when to call PCP? Yes   Is the patient/caregiver able to teach back signs and symptoms related to disease process for when to call 911? Yes   Is the patient/caregiver able to teach back the hierarchy of who to call/visit for symptoms/problems? PCP, Specialist, Home health nurse, Urgent Care, ED, 911 Yes   Week 2 Call Completed? Yes   Is the patient interested in additional calls from an ambulatory ?  NOTE:  applies to high risk patients requiring additional follow-up. No   Wrap up additional comments States is improving-had colonoscopy today, and " 1st call attempt.  Left message to call back.       dx with hernia. States was advised to follow high fiber diet. Denies any needs today.          OSITO ARGUETA - Registered Nurse

## 2024-04-02 NOTE — TELEPHONE ENCOUNTER
Patient stated that he has been having neck pain for a few weeks now. Taking all the medications prescribed but it is still not helping him with the pain. Does not feel like he is getting better. Patient has not been at work since last Thursday and mainly been in bed. Tried to do the MRI of the cervical spine ordered by Dr Hernández yesterday at the televisit, but he was unable to complete it because he was unable to put his neck back all the way due to the pain. Patient's MRI was rescheduled for 4/23/2024 to have done with sedation. Has an appointment with Dr Mena on 4/17/2023, but not sure what to do in the meantime? Wanted message sent to both Dr Hernández and Dr Weiler for guidance. Please advise.

## 2024-04-02 NOTE — TELEPHONE ENCOUNTER
Called central scheduling. PSR, [employer], reached out to staff directly, unable to reach them and had to leave voicemail.  [employer] will call this RN when scheduled.      [employer] called and provided  arrive 1230 or  arrive 1:30.  [employer] advised that patient will need H&P clearing him for IV sedation.    Discussed with Dr. Weiler.  She will see patient for video visit tomorrow, 4/3, to provide clearance.  Called [employer] and confirmed  appointment.    Called patient, confirmed name and . Patient advised that MRI scheduled for , arrive 12:30.  Patient advised that telemedicine is needed for sedation clearance.  Patient verbalized understanding.  Patient scheduled.

## 2024-04-03 ENCOUNTER — TELEMEDICINE (OUTPATIENT)
Dept: FAMILY MEDICINE CLINIC | Facility: CLINIC | Age: 44
End: 2024-04-03
Payer: COMMERCIAL

## 2024-04-03 DIAGNOSIS — M54.12 CERVICAL RADICULOPATHY: Primary | ICD-10-CM

## 2024-04-03 PROCEDURE — 99213 OFFICE O/P EST LOW 20 MIN: CPT | Performed by: FAMILY MEDICINE

## 2024-04-03 RX ORDER — ACETAMINOPHEN 500 MG
1000 TABLET ORAL ONCE
Status: CANCELLED | OUTPATIENT
Start: 2024-04-03 | End: 2024-04-03

## 2024-04-03 RX ORDER — ATOMOXETINE 80 MG/1
CAPSULE ORAL DAILY
COMMUNITY

## 2024-04-03 NOTE — PROGRESS NOTES
HPI: Hussein is a 43 year old male who presents for pre-sedation appointment.  Pt needs MRI with sedation tomorrow for MRI of cervical spine.  We increased Gabapentin to 300mg TID and he feels better today. Finished Medrol dose pack. On Naproxen, Cyclobenzaprine, Diazepam, Norco    Vitals were stable at last visit.     Has had IV sedation before without side effects.     No smoking.  Drinks 1-2 drinks per week. Uses cannabis daily.    PMH:    Past Medical History:   Diagnosis Date    ADHD     Allergic rhinitis     Asthma (HCC)     Cats    Dyslipidemia     Eczema     Essential hypertension     High blood pressure     Hypertension Age 23      Alg:  Crab (diagnostic)   Meds:   Current Outpatient Medications on File Prior to Visit   Medication Sig Dispense Refill    gabapentin 100 MG Oral Cap Take 1 capsule (100 mg total) by mouth nightly. 30 capsule 1    gabapentin 300 MG Oral Cap Take 1 capsule (300 mg total) by mouth 3 (three) times daily. 90 capsule 0    diazePAM 5 MG Oral Tab Take 1 tablet (5 mg total) by mouth 3 (three) times daily as needed (pain/spasm). 30 tablet 0    HYDROcodone-acetaminophen 5-325 MG Oral Tab Take 1 tablet by mouth every 4 (four) hours as needed for Pain. 30 tablet 0    cyclobenzaprine 10 MG Oral Tab Take 1 tablet (10 mg total) by mouth 3 (three) times daily as needed for Muscle spasms. 30 tablet 0    naproxen 500 MG Oral Tab Take 1 tablet (500 mg total) by mouth 2 (two) times daily as needed. 20 tablet 0    methylPREDNISolone (MEDROL) 4 MG Oral Tablet Therapy Pack Dosepack: take as directed 1 each 0    methylPREDNISolone (MEDROL) 4 MG Oral Tablet Therapy Pack Dosepack: take as directed 1 each 0    montelukast 10 MG Oral Tab Take 1 tablet (10 mg total) by mouth nightly. 90 tablet 3    amLODIPine 10 MG Oral Tab Take 1 tablet (10 mg total) by mouth once daily. 90 tablet 3    polyethylene glycol, PEG 3350-KCl-NaBcb-NaCl-NaSulf, 236 g Oral Recon Soln Please take as directed per the written  instructions from the GI office 4000 mL 0    Cholecalciferol (VITAMIN D3) 25 MCG (1000 UT) Oral Cap Take 1 tablet by mouth daily.      cetirizine 10 MG Oral Tab Take 1 tablet (10 mg total) by mouth daily. 90 tablet 3    busPIRone 15 MG Oral Tab Take 1 tablet (15 mg total) by mouth 2 (two) times daily.      Omeprazole 40 MG Oral Capsule Delayed Release Take 1 capsule (40 mg total) by mouth daily. 90 capsule 3    Mometasone Furoate (ELOCON) 0.1 % External Cream Apply 1 Application topically 2 (two) times daily as needed. 50 g 1    lisinopril-hydroCHLOROthiazide 10-12.5 MG Oral Tab Take 1 tablet by mouth daily. 90 tablet 3    Multiple Vitamins-Minerals (MENS MULTI VITAMIN & MINERAL) Oral Tab Take by mouth.       No current facility-administered medications on file prior to visit.      Tobacco Use: no    Objective:   Gen: AOx3. NAD.   There were no vitals taken for this visit.      Assessment:/Plan:  Encounter Diagnosis   Name Primary?    Cervical radiculopathy Yes     Patient is feeling slightly better today with the increase of gabapentin.  We increased it to 300 mg 3 times daily yesterday.  Patient has had IV sedation in the past without complications.  Patient is at low risk for complications from IV sedation for MRI tomorrow.      Please note that the following visit was completed using two-way, real-time interactive audio and video communication.  This has been done in [place] to provide continuity of care in the best interest of the provider-patient relationship, due to the ongoing public health crisis/national emergency and because of restrictions of visitation.  There are limitations of this visit as no physical exam could be performed.  Every conscious effort was taken to allow for sufficient and adequate time.  This billing was spent on reviewing labs, medications, radiology tests and decision making.  Appropriate medical decision-making and tests are ordered as detailed in the plan of care above.         Colleen Weiler, DO

## 2024-04-04 ENCOUNTER — ANESTHESIA (OUTPATIENT)
Dept: MRI IMAGING | Facility: HOSPITAL | Age: 44
End: 2024-04-04
Payer: COMMERCIAL

## 2024-04-04 ENCOUNTER — ANESTHESIA EVENT (OUTPATIENT)
Dept: MRI IMAGING | Facility: HOSPITAL | Age: 44
End: 2024-04-04
Payer: COMMERCIAL

## 2024-04-04 ENCOUNTER — HOSPITAL ENCOUNTER (OUTPATIENT)
Dept: MRI IMAGING | Facility: HOSPITAL | Age: 44
Discharge: HOME | End: 2024-04-04
Attending: FAMILY MEDICINE
Payer: COMMERCIAL

## 2024-04-04 VITALS
TEMPERATURE: 98 F | OXYGEN SATURATION: 94 % | WEIGHT: 215 LBS | SYSTOLIC BLOOD PRESSURE: 130 MMHG | HEIGHT: 71 IN | HEART RATE: 109 BPM | BODY MASS INDEX: 30.1 KG/M2 | DIASTOLIC BLOOD PRESSURE: 86 MMHG | RESPIRATION RATE: 18 BRPM

## 2024-04-04 DIAGNOSIS — M54.2 NECK PAIN: ICD-10-CM

## 2024-04-04 DIAGNOSIS — M54.12 CERVICAL RADICULOPATHY: Primary | ICD-10-CM

## 2024-04-04 DIAGNOSIS — R93.7 ABNORMAL MRI, CERVICAL SPINE: ICD-10-CM

## 2024-04-04 PROCEDURE — 72141 MRI NECK SPINE W/O DYE: CPT | Performed by: FAMILY MEDICINE

## 2024-04-04 RX ORDER — ONDANSETRON 2 MG/ML
INJECTION INTRAMUSCULAR; INTRAVENOUS AS NEEDED
Status: DISCONTINUED | OUTPATIENT
Start: 2024-04-04 | End: 2024-04-04 | Stop reason: SURG

## 2024-04-04 RX ORDER — HYDROMORPHONE HYDROCHLORIDE 1 MG/ML
0.2 INJECTION, SOLUTION INTRAMUSCULAR; INTRAVENOUS; SUBCUTANEOUS EVERY 5 MIN PRN
Status: DISCONTINUED | OUTPATIENT
Start: 2024-04-04 | End: 2024-04-06

## 2024-04-04 RX ORDER — HYDROMORPHONE HYDROCHLORIDE 1 MG/ML
0.6 INJECTION, SOLUTION INTRAMUSCULAR; INTRAVENOUS; SUBCUTANEOUS EVERY 5 MIN PRN
Status: DISCONTINUED | OUTPATIENT
Start: 2024-04-04 | End: 2024-04-06

## 2024-04-04 RX ORDER — NALOXONE HYDROCHLORIDE 0.4 MG/ML
0.08 INJECTION, SOLUTION INTRAMUSCULAR; INTRAVENOUS; SUBCUTANEOUS AS NEEDED
Status: ACTIVE | OUTPATIENT
Start: 2024-04-04 | End: 2024-04-04

## 2024-04-04 RX ORDER — MORPHINE SULFATE 4 MG/ML
4 INJECTION, SOLUTION INTRAMUSCULAR; INTRAVENOUS EVERY 10 MIN PRN
Status: DISCONTINUED | OUTPATIENT
Start: 2024-04-04 | End: 2024-04-06

## 2024-04-04 RX ORDER — MORPHINE SULFATE 10 MG/ML
6 INJECTION, SOLUTION INTRAMUSCULAR; INTRAVENOUS EVERY 10 MIN PRN
Status: DISCONTINUED | OUTPATIENT
Start: 2024-04-04 | End: 2024-04-06

## 2024-04-04 RX ORDER — MORPHINE SULFATE 4 MG/ML
2 INJECTION, SOLUTION INTRAMUSCULAR; INTRAVENOUS EVERY 10 MIN PRN
Status: DISCONTINUED | OUTPATIENT
Start: 2024-04-04 | End: 2024-04-06

## 2024-04-04 RX ORDER — HYDROMORPHONE HYDROCHLORIDE 1 MG/ML
0.4 INJECTION, SOLUTION INTRAMUSCULAR; INTRAVENOUS; SUBCUTANEOUS EVERY 5 MIN PRN
Status: DISCONTINUED | OUTPATIENT
Start: 2024-04-04 | End: 2024-04-06

## 2024-04-04 RX ORDER — SODIUM CHLORIDE, SODIUM LACTATE, POTASSIUM CHLORIDE, CALCIUM CHLORIDE 600; 310; 30; 20 MG/100ML; MG/100ML; MG/100ML; MG/100ML
INJECTION, SOLUTION INTRAVENOUS CONTINUOUS
Status: DISCONTINUED | OUTPATIENT
Start: 2024-04-04 | End: 2024-04-06

## 2024-04-04 RX ORDER — KETOROLAC TROMETHAMINE 30 MG/ML
30 INJECTION, SOLUTION INTRAMUSCULAR; INTRAVENOUS ONCE
Status: COMPLETED | OUTPATIENT
Start: 2024-04-04 | End: 2024-04-04

## 2024-04-04 RX ORDER — DEXAMETHASONE SODIUM PHOSPHATE 4 MG/ML
VIAL (ML) INJECTION AS NEEDED
Status: DISCONTINUED | OUTPATIENT
Start: 2024-04-04 | End: 2024-04-04 | Stop reason: SURG

## 2024-04-04 RX ADMIN — SODIUM CHLORIDE, SODIUM LACTATE, POTASSIUM CHLORIDE, CALCIUM CHLORIDE: 600; 310; 30; 20 INJECTION, SOLUTION INTRAVENOUS at 14:23:00

## 2024-04-04 RX ADMIN — KETOROLAC TROMETHAMINE 30 MG: 30 INJECTION, SOLUTION INTRAMUSCULAR; INTRAVENOUS at 15:30:00

## 2024-04-04 RX ADMIN — SODIUM CHLORIDE, SODIUM LACTATE, POTASSIUM CHLORIDE, CALCIUM CHLORIDE: 600; 310; 30; 20 INJECTION, SOLUTION INTRAVENOUS at 12:32:00

## 2024-04-04 RX ADMIN — ONDANSETRON 4 MG: 2 INJECTION INTRAMUSCULAR; INTRAVENOUS at 14:35:00

## 2024-04-04 RX ADMIN — DEXAMETHASONE SODIUM PHOSPHATE 4 MG: 4 MG/ML VIAL (ML) INJECTION at 14:35:00

## 2024-04-04 NOTE — ANESTHESIA PREPROCEDURE EVALUATION
Anesthesia PreOp Note    HPI:     Hussein Davila is a 43 year old male who presents for preoperative consultation requested by: * No surgeons listed *    Date of Surgery: 4/4/2024    * No procedures listed *  Indication: * No pre-op diagnosis entered *    Relevant Problems   No relevant active problems       NPO:  Last Liquid Consumption Date: 04/04/24  Last Liquid Consumption Time: 0800 (1 cup of water with medication)  Last Solid Consumption Date: 04/03/24  Last Solid Consumption Time: 2000  Last Liquid Consumption Date: 04/04/24          History Review:  Patient Active Problem List    Diagnosis Date Noted    Dysfunction of left eustachian tube 05/19/2019    Pharyngitis due to Streptococcus species 01/20/2017    Dyslipidemia 03/20/2015    Asthma (HCC) 03/20/2015    Eczema 03/20/2015    HTN (hypertension) 12/01/2014       Past Medical History:   Diagnosis Date    ADHD     Allergic rhinitis     Asthma (HCC)     Cats    Dyslipidemia     Eczema     Essential hypertension     High blood pressure     Hypertension Age 23    Visual impairment     glasses/contacts       Past Surgical History:   Procedure Laterality Date    OTHER  Childhood    Bilateral myringotomy tubes    VASECTOMY         (Not in a hospital admission)    Current Outpatient Medications Ordered in Epic   Medication Sig Dispense Refill    Atomoxetine HCl 80 MG Oral Cap Take by mouth daily.      gabapentin 300 MG Oral Cap Take 1 capsule (300 mg total) by mouth 3 (three) times daily. 90 capsule 0    diazePAM 5 MG Oral Tab Take 1 tablet (5 mg total) by mouth 3 (three) times daily as needed (pain/spasm). 30 tablet 0    HYDROcodone-acetaminophen 5-325 MG Oral Tab Take 1 tablet by mouth every 4 (four) hours as needed for Pain. 30 tablet 0    cyclobenzaprine 10 MG Oral Tab Take 1 tablet (10 mg total) by mouth 3 (three) times daily as needed for Muscle spasms. (Patient taking differently: Take 1 tablet (10 mg total) by mouth 3 (three) times daily.)  30 tablet 0    naproxen 500 MG Oral Tab Take 1 tablet (500 mg total) by mouth 2 (two) times daily as needed. 20 tablet 0    montelukast 10 MG Oral Tab Take 1 tablet (10 mg total) by mouth nightly. 90 tablet 3    amLODIPine 10 MG Oral Tab Take 1 tablet (10 mg total) by mouth once daily. (Patient taking differently: Take 1 tablet (10 mg total) by mouth every morning.) 90 tablet 3    Cholecalciferol (VITAMIN D3) 25 MCG (1000 UT) Oral Cap Take 1 tablet by mouth daily.      cetirizine 10 MG Oral Tab Take 1 tablet (10 mg total) by mouth daily. (Patient taking differently: Take 1 tablet (10 mg total) by mouth every morning.) 90 tablet 3    busPIRone 15 MG Oral Tab Take 1 tablet (15 mg total) by mouth 2 (two) times daily.      Mometasone Furoate (ELOCON) 0.1 % External Cream Apply 1 Application topically 2 (two) times daily as needed. 50 g 1    lisinopril-hydroCHLOROthiazide 10-12.5 MG Oral Tab Take 1 tablet by mouth daily. 90 tablet 3    Multiple Vitamins-Minerals (MENS MULTI VITAMIN & MINERAL) Oral Tab Take by mouth.      polyethylene glycol, PEG 3350-KCl-NaBcb-NaCl-NaSulf, 236 g Oral Recon Soln Please take as directed per the written instructions from the GI office 4000 mL 0     Current Facility-Administered Medications Ordered in Epic   Medication Dose Route Frequency Provider Last Rate Last Admin    lactated ringers infusion   Intravenous Continuous Royal Hernández MD 20 mL/hr at 24 1232 New Bag at 24 1232       Allergies   Allergen Reactions    Crab (Diagnostic) HIVES       Family History   Problem Relation Age of Onset    Asthma Mother     Other (Other) Mother         asthma    Diabetes Father     Hypertension Father     Other (CVA) Father         Age 70s    Diabetes Brother     Colon Cancer Paternal Grandmother     Heart Disease Maternal Grandfather          age 60s     Social History     Socioeconomic History    Marital status:    Occupational History    Occupation: Manager   Tobacco Use     Smoking status: Former     Packs/day: 1.00     Years: 6.00     Additional pack years: 0.00     Total pack years: 6.00     Types: Cigarettes     Quit date: 3/20/2005     Years since quittin.0     Passive exposure: Never    Smokeless tobacco: Current   Vaping Use    Vaping Use: Some days   Substance and Sexual Activity    Alcohol use: Yes     Comment: Occasional beer    Drug use: Yes     Types: Cannabis     Comment: vape   Other Topics Concern    Caffeine Concern Yes     Comment: coffee       Available pre-op labs reviewed.             Vital Signs:  Body mass index is 29.99 kg/m².   height is 1.803 m (5' 11\") and weight is 97.5 kg (215 lb). His oral temperature is 97.8 °F (36.6 °C). His blood pressure is 144/96 (abnormal) and his pulse is 106. His respiration is 16 and oxygen saturation is 97%.   Vitals:    24 1518 24 1230   BP:  (!) 144/96   Pulse:  106   Resp:  16   Temp:  97.8 °F (36.6 °C)   TempSrc:  Oral   SpO2:  97%   Weight: 97.5 kg (215 lb) 97.5 kg (215 lb)   Height: 1.803 m (5' 11\") 1.803 m (5' 11\")        Anesthesia Evaluation     Patient summary reviewed and Nursing notes reviewed    Airway   Mallampati: II  TM distance: >3 FB  Neck ROM: full  Dental - Dentition appears grossly intact     Pulmonary - normal exam   (+) asthma  Cardiovascular - normal exam  (+) hypertension    Neuro/Psych - negative ROS     GI/Hepatic/Renal - negative ROS     Endo/Other - negative ROS   Abdominal  - normal exam                 Anesthesia Plan:   ASA:  2  Plan:   General  Airway:  LMA  Post-op Pain Management: IV analgesics  Informed Consent Plan and Risks Discussed With:  Patient  Use of Blood Products Discussed With:  Patient  Blood Product Use Consented    Discussed plan with:  Surgeon      I have informed Hussein Davila and/or legal guardian or family member of the nature of the anesthetic plan, benefits, risks including possible dental damage if relevant, major complications, and any  alternative forms of anesthetic management.   All of the patient's questions were answered to the best of my ability. The patient desires the anesthetic management as planned.  BJ INGRAM MD  4/4/2024 1:31 PM  Present on Admission:  **None**

## 2024-04-04 NOTE — ANESTHESIA POSTPROCEDURE EVALUATION
Patient: Hussein Davila    Procedure Summary       Date: 04/04/24 Room / Location: [facility] MRI; [facility] Post Anesthesia Care Unit    Anesthesia Start: 1423 Anesthesia Stop:     Procedure: MRI SPINE CERVICAL (CPT=72141) Diagnosis: Neck pain    Scheduled Providers: Buster Ingram MD Anesthesiologist: Buster Ingram MD    Anesthesia Type: general ASA Status: 2            Anesthesia Type: general    Vitals Value Taken Time   /57 04/04/24 1525   Temp 97.8 °F (36.6 °C) 04/04/24 1525   Pulse 108 04/04/24 1525   Resp 16 04/04/24 1525   SpO2 98 % 04/04/24 1525   Vitals shown include unfiled device data.    [facility] AN Post Evaluation:   Patient Evaluated in PACU  Patient Participation: complete - patient participated  Level of Consciousness: awake  Pain Management: adequate  Airway Patency:patent  Dental exam unchanged from preop  Yes    Cardiovascular Status: acceptable  Respiratory Status: acceptable  Postoperative Hydration acceptable      BUSTER INGRAM MD  4/4/2024 3:26 PM

## 2024-04-04 NOTE — ANESTHESIA PROCEDURE NOTES
Airway  Date/Time: 4/4/2024 2:26 PM  Urgency: Elective      General Information and Staff    Patient location during procedure: OR  Anesthesiologist: Buster Prieto MD  Resident/CRNA: Henry Meier CRNA  Performed: anesthesiologist   Performed by: Henry Meier CRNA  Authorized by: Buster Prieto MD      Indications and Patient Condition  Indications for airway management: anesthesia  Sedation level: deep  Preoxygenated: yes  Patient position: sniffing  MILS maintained throughout  Mask difficulty assessment: 0 - not attempted    Final Airway Details  Final airway type: supraglottic airway      Successful airway: classic  Size 4       Number of attempts at approach: 1    Additional Comments  Head maintained in neutral position throughout LMA placement.  LMA 4 placed easily

## 2024-04-05 RX ORDER — CYCLOBENZAPRINE HCL 10 MG
10 TABLET ORAL 3 TIMES DAILY PRN
Qty: 30 TABLET | Refills: 0 | Status: SHIPPED | OUTPATIENT
Start: 2024-04-05

## 2024-04-05 NOTE — TELEPHONE ENCOUNTER
Called patient, confirmed name and .    Patient states he received this RN message about appointment with neurosurgery.  Patient is requesting refill of cyclobenzaprine.     Dr. Hernández, you saw this patient of Dr. Weiler's.  Medication pended.

## 2024-04-08 ENCOUNTER — TELEMEDICINE (OUTPATIENT)
Dept: FAMILY MEDICINE CLINIC | Facility: CLINIC | Age: 44
End: 2024-04-08
Payer: COMMERCIAL

## 2024-04-08 DIAGNOSIS — M54.12 CERVICAL RADICULOPATHY: Primary | ICD-10-CM

## 2024-04-08 DIAGNOSIS — M54.12 CERVICAL RADICULOPATHY: ICD-10-CM

## 2024-04-08 DIAGNOSIS — M54.2 NECK PAIN: ICD-10-CM

## 2024-04-08 DIAGNOSIS — R93.7 ABNORMAL MRI, CERVICAL SPINE: ICD-10-CM

## 2024-04-08 RX ORDER — ATOMOXETINE 60 MG/1
CAPSULE ORAL
COMMUNITY
Start: 2024-01-08 | End: 2024-04-08

## 2024-04-08 NOTE — PROGRESS NOTES
HPI: Hussein is a 43 year old male who presents for follow-up cervical radiculopathy.  Pt is having a hard time sleeping due to pain.  Very disruptive to household. Having a hard time working as well. Had abnormal MRI and meeting with Neurosurgeon.     PMH:    Past Medical History:   Diagnosis Date    ADHD     Allergic rhinitis     Asthma (HCC)     Cats    Dyslipidemia     Eczema     Essential hypertension     High blood pressure     Hypertension Age 23    Visual impairment     glasses/contacts      Alg:  Crab (diagnostic)   Meds:   Current Outpatient Medications on File Prior to Visit   Medication Sig Dispense Refill    cyclobenzaprine 10 MG Oral Tab Take 1 tablet (10 mg total) by mouth 3 (three) times daily as needed for Muscle spasms. 30 tablet 0    Atomoxetine HCl 80 MG Oral Cap Take by mouth daily.      gabapentin 300 MG Oral Cap Take 1 capsule (300 mg total) by mouth 3 (three) times daily. 90 capsule 0    diazePAM 5 MG Oral Tab Take 1 tablet (5 mg total) by mouth 3 (three) times daily as needed (pain/spasm). 30 tablet 0    HYDROcodone-acetaminophen 5-325 MG Oral Tab Take 1 tablet by mouth every 4 (four) hours as needed for Pain. 30 tablet 0    naproxen 500 MG Oral Tab Take 1 tablet (500 mg total) by mouth 2 (two) times daily as needed. 20 tablet 0    montelukast 10 MG Oral Tab Take 1 tablet (10 mg total) by mouth nightly. 90 tablet 3    amLODIPine 10 MG Oral Tab Take 1 tablet (10 mg total) by mouth once daily. (Patient taking differently: Take 1 tablet (10 mg total) by mouth every morning.) 90 tablet 3    polyethylene glycol, PEG 3350-KCl-NaBcb-NaCl-NaSulf, 236 g Oral Recon Soln Please take as directed per the written instructions from the GI office 4000 mL 0    Cholecalciferol (VITAMIN D3) 25 MCG (1000 UT) Oral Cap Take 1 tablet by mouth daily.      cetirizine 10 MG Oral Tab Take 1 tablet (10 mg total) by mouth daily. (Patient taking differently: Take 1 tablet (10 mg total) by mouth every morning.) 90 tablet 3     busPIRone 15 MG Oral Tab Take 1 tablet (15 mg total) by mouth 2 (two) times daily.      Mometasone Furoate (ELOCON) 0.1 % External Cream Apply 1 Application topically 2 (two) times daily as needed. 50 g 1    lisinopril-hydroCHLOROthiazide 10-12.5 MG Oral Tab Take 1 tablet by mouth daily. 90 tablet 3    Multiple Vitamins-Minerals (MENS MULTI VITAMIN & MINERAL) Oral Tab Take by mouth.       No current facility-administered medications on file prior to visit.      Tobacco Use: no    Objective:   Gen: AOx3. NAD.  There were no vitals taken for this visit.      Assessment:/Plan:  Encounter Diagnoses   Name Primary?    Cervical radiculopathy Yes    Abnormal MRI, cervical spine        Reviewed MRI with patient and abnormalities.  Will see Neurosurgery tomorrow.  Will stay on current medication regimen right now.  Will update me with information tomorrow.       Please note that the following visit was completed using two-way, real-time interactive audio and video communication.  This has been done in [place] to provide continuity of care in the best interest of the provider-patient relationship, due to the ongoing public health crisis/national emergency and because of restrictions of visitation.  There are limitations of this visit as no physical exam could be performed.  Every conscious effort was taken to allow for sufficient and adequate time.  This billing was spent on reviewing labs, medications, radiology tests and decision making.  Appropriate medical decision-making and tests are ordered as detailed in the plan of care above.        Colleen Weiler, DO

## 2024-04-09 ENCOUNTER — PATIENT MESSAGE (OUTPATIENT)
Dept: FAMILY MEDICINE CLINIC | Facility: CLINIC | Age: 44
End: 2024-04-09

## 2024-04-09 ENCOUNTER — OFFICE VISIT (OUTPATIENT)
Dept: SURGERY | Facility: CLINIC | Age: 44
End: 2024-04-09
Payer: COMMERCIAL

## 2024-04-09 VITALS
HEART RATE: 120 BPM | SYSTOLIC BLOOD PRESSURE: 172 MMHG | HEIGHT: 71 IN | BODY MASS INDEX: 32.62 KG/M2 | WEIGHT: 233 LBS | DIASTOLIC BLOOD PRESSURE: 81 MMHG

## 2024-04-09 DIAGNOSIS — M54.12 CERVICAL RADICULOPATHY AT C6: Primary | ICD-10-CM

## 2024-04-09 DIAGNOSIS — M54.2 NECK PAIN: ICD-10-CM

## 2024-04-09 DIAGNOSIS — M54.12 CERVICAL RADICULOPATHY: ICD-10-CM

## 2024-04-09 PROCEDURE — 3077F SYST BP >= 140 MM HG: CPT | Performed by: NEUROLOGICAL SURGERY

## 2024-04-09 PROCEDURE — 3008F BODY MASS INDEX DOCD: CPT | Performed by: NEUROLOGICAL SURGERY

## 2024-04-09 PROCEDURE — 3079F DIAST BP 80-89 MM HG: CPT | Performed by: NEUROLOGICAL SURGERY

## 2024-04-09 PROCEDURE — 99204 OFFICE O/P NEW MOD 45 MIN: CPT | Performed by: NEUROLOGICAL SURGERY

## 2024-04-09 RX ORDER — HYDROCODONE BITARTRATE AND ACETAMINOPHEN 5; 325 MG/1; MG/1
1 TABLET ORAL EVERY 4 HOURS PRN
Qty: 30 TABLET | Refills: 0 | Status: SHIPPED | OUTPATIENT
Start: 2024-04-09

## 2024-04-09 RX ORDER — DIAZEPAM 5 MG/1
5 TABLET ORAL 3 TIMES DAILY PRN
Qty: 30 TABLET | Refills: 0 | OUTPATIENT
Start: 2024-04-09

## 2024-04-09 RX ORDER — DIAZEPAM 5 MG/1
5 TABLET ORAL 3 TIMES DAILY PRN
Qty: 30 TABLET | Refills: 0 | Status: CANCELLED | OUTPATIENT
Start: 2024-04-09

## 2024-04-09 RX ORDER — DIAZEPAM 5 MG/1
5 TABLET ORAL 3 TIMES DAILY PRN
Qty: 30 TABLET | Refills: 0 | Status: SHIPPED | OUTPATIENT
Start: 2024-04-09

## 2024-04-09 RX ORDER — CYCLOBENZAPRINE HCL 10 MG
10 TABLET ORAL 3 TIMES DAILY PRN
Qty: 30 TABLET | Refills: 0 | Status: SHIPPED | OUTPATIENT
Start: 2024-04-09

## 2024-04-09 NOTE — TELEPHONE ENCOUNTER
Patient called back to check the status on this refill request. He states that he has NO medication left.

## 2024-04-09 NOTE — TELEPHONE ENCOUNTER
Patient called, verified Name and . He is requesting for refill of diazepam. Will run out tomorrow. Medication pended to run through protocol. Verified pharmacy.

## 2024-04-09 NOTE — TELEPHONE ENCOUNTER
Please review. Rx failed.       Zakiya Moore  10 minutes ago (9:31 AM)     Patient called back to check the status on this refill request. He states that he has NO medication left.        Norco 5/325 - Patient's dispense hx: 04/01/2024 and 03/28/24.    Requested Prescriptions   Pending Prescriptions Disp Refills    HYDROcodone-acetaminophen 5-325 MG Oral Tab 30 tablet 0     Sig: Take 1 tablet by mouth every 4 (four) hours as needed for Pain.       Controlled Substance Medication Failed - 4/9/2024  9:31 AM        Failed - This medication is a controlled substance - forward to provider to refill          cyclobenzaprine 10 MG Oral Tab 30 tablet 0     Sig: Take 1 tablet (10 mg total) by mouth 3 (three) times daily as needed for Muscle spasms.       There is no refill protocol information for this order          Future Appointments         Provider Department Appt Notes    In 1 week [facility]    In 2 weeks [facility] Shankar [facility] PT Follow up    In 4 weeks ANDREEA LIGHT [facility] CLN w/MAC @ [facility]          Recent Outpatient Visits              Today Cervical radiculopathy at [address] Skyler Shankar MD    Office Visit    Yesterday Cervical radiculopathy    [facility] Weiler, Colleen M, DO    Telemedicine    6 days ago Cervical radiculopathy    [facility] Weiler, Colleen M, DO    Telemedicine    1 week ago Neck pain    [facility] Royal Hernández MD    Telemedicine    3 months ago [facility]    [facility]    [facility] Only

## 2024-04-09 NOTE — PATIENT INSTRUCTIONS
Referring patient to Chiropractor.  Follow up with Dr. Shankar in 2 weeks.     Refill policies:    Allow 2-3 business days for refills; controlled substances may take longer.  Contact your pharmacy at least 5 days prior to running out of medication and have them send an electronic request or submit request through the “request refill” option in your [employer] account.  Refills are not addressed on weekends; covering physicians do not authorize routine medications on weekends.  No narcotics or controlled substances are refilled after noon on Fridays or by on call physicians.  By law, narcotics must be electronically prescribed.  A 30 day supply with no refills is the maximum allowed.  If your prescription is due for a refill, you may be due for a follow up appointment.  To best provide you care, patients receiving routine medications need to be seen at least once a year.  Patients receiving narcotic/controlled substance medications need to be seen at least once every 3 months.  In the event that your preferred pharmacy does not have the requested medication in stock (e.g. Backordered), it is your responsibility to find another pharmacy that has the requested medication available.  We will gladly send a new prescription to that pharmacy at your request.    Scheduling Tests:    If your physician has ordered radiology tests such as MRI or CT scans, please contact Central Scheduling at [phone] right away to schedule the test.  Once scheduled, the [facility] Centralized Referral Team will work with your insurance carrier to obtain pre-certification or prior authorization.  Depending on your insurance carrier, approval may take 3-10 days.  It is highly recommended patients assure they have received an authorization before having a test performed.  If test is done without insurance authorization, patient may be responsible for the entire amount billed.      Precertification and Prior Authorizations:  If your physician has  recommended that you have a procedure or additional testing performed the [facility] Centralized Referral Team will contact your insurance carrier to obtain pre-certification or prior authorization.    You are strongly encouraged to contact your insurance carrier to verify that your procedure/test has been approved and is a COVERED benefit.  Although the [facility] Centralized Referral Team does its due diligence, the insurance carrier gives the disclaimer that \"Although the procedure is authorized, this does not guarantee payment.\"    Ultimately the patient is responsible for payment.   Thank you for your understanding in this matter.  Paperwork Completion:  If you require FMLA or disability paperwork for your recovery, please make sure to either drop it off or have it faxed to our office at [phone]. Be sure the form has your name and date of birth on it.  The form will be faxed to our Forms Department and they will complete it for you.  There is a 25$ fee for all forms that need to be filled out.  Please be aware there is a 10-14 day turnaround time.  You will need to sign a release of information (LATRICAI) form if your paperwork does not come with one.  You may call the Forms Department with any questions at [phone].  Their fax number is [phone].

## 2024-04-09 NOTE — TELEPHONE ENCOUNTER
Dr. Weiler, please advise on refill requests. Patient sent mychart yesterday stating he only had two pills left of Hydrocodone.

## 2024-04-09 NOTE — H&P
PAM STONE M.D., F.A.A.N.S.     of Neurosurgery  [facility]  Board Certified Neurosurgeon                              [facility] Neurosurgery        [place] [facility]      [address]  [address] [place] [address]    PHONE  [phone]          FAX  [phone]    [email or web address]    [facility] [place]     OFFICE CONSULTATION          Hussein Davila  : 1980   MRN: [id number]    PCP: Colleen Weiler, DO  Referring Provider: Colleen M Weiler     Insurance: Payor: [facility] HMO / Plan: [facility] BLUE ADVANTAGE HMO / Product Type: HMO /            Date of Consult:  2024    Reason for Consultation:  Our patient has been referred to our office for evaluation of: Severe left upper extremity pain      History of Present Illness:  Hussein Davila is a a(n) right-handed, 43 year old, male.  This is a pleasant gentleman with 2 weeks of severe left upper extremity pain that radiates towards his thumb.  He has difficulty performing his activities of daily living and difficulty with his job.  He has been in the emergency room and has been getting Toradol for this pain.  He has not attempted any physical therapy or any other procedures at this point in time.  He has not had this issue before and he denies any right upper extremity issues.  There are no balance issues and no acute bowel or bladder changes.        History:  Past Medical History:   Diagnosis Date    ADHD     Allergic rhinitis     Asthma (HCC)     Cats    Dyslipidemia     Eczema     Essential hypertension     High blood pressure     Hypertension Age 23    Visual impairment     glasses/contacts      Past Surgical History:   Procedure Laterality Date    OTHER  Childhood    Bilateral myringotomy tubes    VASECTOMY       Family History   Problem Relation Age of Onset    Asthma Mother     Other (Other)  Mother         asthma    Diabetes Father     Hypertension Father     Other (CVA) Father         Age 70s    Diabetes Brother     Colon Cancer Paternal Grandmother     Heart Disease Maternal Grandfather          age 60s      Social History     Socioeconomic History    Marital status:      Spouse name: Not on file    Number of children: Not on file    Years of education: Not on file    Highest education level: Not on file   Occupational History    Occupation: Manager   Tobacco Use    Smoking status: Former     Packs/day: 1.00     Years: 6.00     Additional pack years: 0.00     Total pack years: 6.00     Types: Cigarettes     Quit date: 3/20/2005     Years since quittin.0     Passive exposure: Never    Smokeless tobacco: Current   Vaping Use    Vaping Use: Some days   Substance and Sexual Activity    Alcohol use: Yes     Comment: Occasional beer    Drug use: Yes     Types: Cannabis     Comment: vape    Sexual activity: Not on file   Other Topics Concern     Service Not Asked    Blood Transfusions Not Asked    Caffeine Concern Yes     Comment: coffee    Occupational Exposure Not Asked    Hobby Hazards Not Asked    Sleep Concern Not Asked    Stress Concern Not Asked    Weight Concern Not Asked    Special Diet Not Asked    Back Care Not Asked    Exercise Not Asked    Bike Helmet Not Asked    Seat Belt Not Asked    Self-Exams Not Asked   Social History Narrative    Not on file     Social Determinants of Health     Financial Resource Strain: Not on file   Food Insecurity: Not on file   Transportation Needs: Not on file   Physical Activity: Not on file   Stress: Not on file   Social Connections: Not on file   Housing Stability: Not on file        Allergies:  Allergies   Allergen Reactions    Crab (Diagnostic) HIVES       Medications:  Current Outpatient Medications   Medication Sig Dispense Refill    cyclobenzaprine 10 MG Oral Tab Take 1 tablet (10 mg total) by mouth 3 (three) times daily as needed for  Muscle spasms. 30 tablet 0    Atomoxetine HCl 80 MG Oral Cap Take by mouth daily.      gabapentin 300 MG Oral Cap Take 1 capsule (300 mg total) by mouth 3 (three) times daily. 90 capsule 0    diazePAM 5 MG Oral Tab Take 1 tablet (5 mg total) by mouth 3 (three) times daily as needed (pain/spasm). 30 tablet 0    HYDROcodone-acetaminophen 5-325 MG Oral Tab Take 1 tablet by mouth every 4 (four) hours as needed for Pain. 30 tablet 0    naproxen 500 MG Oral Tab Take 1 tablet (500 mg total) by mouth 2 (two) times daily as needed. 20 tablet 0    montelukast 10 MG Oral Tab Take 1 tablet (10 mg total) by mouth nightly. 90 tablet 3    amLODIPine 10 MG Oral Tab Take 1 tablet (10 mg total) by mouth once daily. (Patient taking differently: Take 1 tablet (10 mg total) by mouth every morning.) 90 tablet 3    polyethylene glycol, PEG 3350-KCl-NaBcb-NaCl-NaSulf, 236 g Oral Recon Soln Please take as directed per the written instructions from the GI office 4000 mL 0    Cholecalciferol (VITAMIN D3) 25 MCG (1000 UT) Oral Cap Take 1 tablet by mouth daily.      cetirizine 10 MG Oral Tab Take 1 tablet (10 mg total) by mouth daily. (Patient taking differently: Take 1 tablet (10 mg total) by mouth every morning.) 90 tablet 3    busPIRone 15 MG Oral Tab Take 1 tablet (15 mg total) by mouth 2 (two) times daily.      Mometasone Furoate (ELOCON) 0.1 % External Cream Apply 1 Application topically 2 (two) times daily as needed. 50 g 1    lisinopril-hydroCHLOROthiazide 10-12.5 MG Oral Tab Take 1 tablet by mouth daily. 90 tablet 3    Multiple Vitamins-Minerals (MENS MULTI VITAMIN & MINERAL) Oral Tab Take by mouth.          Review of Systems:  A 10-point system was reviewed.  Pertinent positives and negatives are noted in HPI.      Physical Exam:  BP (!) 172/81   Pulse 120   Ht 71\"   Wt 233 lb (105.7 kg)   BMI 32.50 kg/m²        Neurological Exam:    Motor Strength:  5/5 AMG and symmetric    Sensation to light touch:  There is decreased sensation  along the left thenar hand relative to the right    DTRs:  2+/4 in right upper extremity with absent brachial radialis on the left    Long tract signs:  Negative ambrocio  Negative babinski  Negative clonus      Abdomen:  Soft, non-distended, non-tender, with no rebound or guarding.  No peritoneal signs.     Extremities:  Non-tender, no lower extremity edema noted.      Labs:  CBC:  Lab Results   Component Value Date    WBC 12.1 (H) 01/02/2024    HGB 16.9 01/02/2024    HCT 48.5 01/02/2024    MCV 87.9 01/02/2024    .0 01/02/2024      BMG:   Lab Results   Component Value Date     (L) 01/02/2024    K 3.6 01/02/2024    CO2 29.0 01/02/2024     01/02/2024    BUN 12 01/02/2024      INR:   No results found for: \"INR\", \"PROTIME\"     Diagnostics:  I reviewed an MRI of the cervical spine with evidence of proper alignment of the cervical vertebral bodies and maintenance of regional cervical lordosis.  At C5-6 there is a large disc extrusion towards the left causing lateral recess stenosis.  At C6-7, there is degenerative disc disease with foraminal narrowing bilaterally.     Diagnosis:  1. Cervical radiculopathy at C6        Assessment/Plan:  Hussein Davila is a a(n) 43 year old, male, presents with a 2-week-old acute left C6 radiculopathy with sensory changes and loss of reflex.  Strength is intact.  At this point in time, I have recommended an attempt with physical therapy and traction to allow that to improve his symptoms.  He is in significant pain but I do believe that given the acuity of the pathology, conservative treatment should be allowed as an option.  He is encouraged to continue his oral analgesics and Neurontin and I would like for him to follow-up in 2 weeks to reassess for a cervical arthroplasty at that point in time.    All questions and concerns were addressed. We appreciate the opportunity to participate in the care of this patient. Please do not hesitate to call our office  ([phone]) with any issues.   This report will be submitted to the referring provider.          Skyler Shankar M.D., F.A.A.N.S.    4/9/2024  9:00 AM    This note has been dictated utilizing voice recognition software. Unfortunately, this may lead to occasional typographical errors. If there are any questions regarding this, please do not hesitate to contact our office.

## 2024-04-09 NOTE — TELEPHONE ENCOUNTER
Patient called, verified Name and . He is following up on refill of hydrocodone and cyclobenzaprine. Relayed that prescription was just approved and sent today. Patient verbalized understanding. He will follow up with pharmacy.

## 2024-04-09 NOTE — PROGRESS NOTES
Patient was referred by PCP to seek care due to Cervical Radiculopathy.  Most recent imaging dated on: 4/4/24  Physical Therapy / Injections: Patient has had a cortisone injection twice with no relief.  Numbness / Tingling: Patient reports numbness and tingling on left arm and fingers.   Pain Level: 8/10. Patient states that they are having pain located on their left upper shoulder/back.

## 2024-04-09 NOTE — TELEPHONE ENCOUNTER
From: Hussein Davila  To: Colleen Weiler  Sent: 4/9/2024 10:43 AM CDT  Subject: Met w/ Dr. Boco Dr. Weiler,     I met w/ Dr. Shankar today and he was nice & very confident. I'm glad that we had the MRI done before I saw him, it made the appt. very effective. Thank you for pushing to make the MRI happen, I appreciate you and the whole team you work with.     The plan is to do PT for two weeks, and if that does not solve the issue we will do surgery. I've already set up the first PT appt for this Thursday. I'll let you know how that turns out.     He said I should stay on the same meds during this process. I have no more Hydrocodone (your team is aware of this) and I have about three days left of the Cyclobenezaprine & the Diazepam.     Thank you,     Alphonse

## 2024-04-10 DIAGNOSIS — M54.2 NECK PAIN: ICD-10-CM

## 2024-04-10 DIAGNOSIS — M54.12 CERVICAL RADICULOPATHY: ICD-10-CM

## 2024-04-11 ENCOUNTER — TELEPHONE (OUTPATIENT)
Dept: SURGERY | Facility: CLINIC | Age: 44
End: 2024-04-11

## 2024-04-11 RX ORDER — MOMETASONE FUROATE 1 MG/G
1 CREAM TOPICAL 2 TIMES DAILY PRN
Qty: 50 G | Refills: 1 | Status: SHIPPED | OUTPATIENT
Start: 2024-04-11

## 2024-04-11 RX ORDER — DIAZEPAM 5 MG/1
5 TABLET ORAL 3 TIMES DAILY PRN
Qty: 30 TABLET | Refills: 0 | OUTPATIENT
Start: 2024-04-11

## 2024-04-11 NOTE — TELEPHONE ENCOUNTER
Please review; protocol failed/No Protocol    LOV: 01/02/2024    Requested Prescriptions   Pending Prescriptions Disp Refills    Mometasone Furoate (ELOCON) 0.1 % External Cream 50 g 1     Sig: Apply 1 Application  topically 2 (two) times daily as needed.       There is no refill protocol information for this order      Refused Prescriptions Disp Refills    diazePAM 5 MG Oral Tab 30 tablet 0     Sig: Take 1 tablet (5 mg total) by mouth 3 (three) times daily as needed (pain/spasm).       Controlled Substance Medication Failed - 4/10/2024  7:51 PM        Failed - This medication is a controlled substance - forward to provider to refill           Future Appointments         Provider Department Appt Notes    In 1 week Skyler Shankar MD [facility] PT Follow up    In 3 weeks ANDREEA LIGHT [facility] CLN w/MAC @ [facility]          Recent Outpatient Visits              2 days ago Cervical radiculopathy at C6    [facility] Skyler Shankar MD    Office Visit    3 days ago Cervical radiculopathy    [facility] Weiler, Colleen M, DO    Telemedicine    1 week ago Cervical radiculopathy    [facility] Weiler, Colleen M, DO    Telemedicine    1 week ago Neck pain    [facility] Royal Hernández MD    Telemedicine    3 months ago Colon cancer screening    [facility]    Nurse Only

## 2024-04-11 NOTE — TELEPHONE ENCOUNTER
Received a fax in regards to patients Physical Therapy referral being approved. Patient has been approved from 04/10/24 - 06/10/24 with Benito To DC at [facility], 8 PT/OP/ST Visits. Approval letter sent to scanning.

## 2024-04-12 ENCOUNTER — TELEPHONE (OUTPATIENT)
Dept: FAMILY MEDICINE CLINIC | Facility: CLINIC | Age: 44
End: 2024-04-12

## 2024-04-12 ENCOUNTER — PATIENT MESSAGE (OUTPATIENT)
Dept: FAMILY MEDICINE CLINIC | Facility: CLINIC | Age: 44
End: 2024-04-12

## 2024-04-12 DIAGNOSIS — M54.12 CERVICAL RADICULOPATHY: Primary | ICD-10-CM

## 2024-04-12 RX ORDER — GABAPENTIN 300 MG/1
900 CAPSULE ORAL 3 TIMES DAILY
Qty: 270 CAPSULE | Refills: 0 | Status: SHIPPED | OUTPATIENT
Start: 2024-04-12

## 2024-04-12 RX ORDER — GABAPENTIN 300 MG/1
300 CAPSULE ORAL 3 TIMES DAILY
Qty: 90 CAPSULE | Refills: 0 | OUTPATIENT
Start: 2024-04-12

## 2024-04-12 RX ORDER — GABAPENTIN 300 MG/1
300 CAPSULE ORAL 3 TIMES DAILY
Qty: 270 CAPSULE | Refills: 3 | Status: SHIPPED | OUTPATIENT
Start: 2024-04-12 | End: 2024-04-12

## 2024-04-12 NOTE — TELEPHONE ENCOUNTER
Referral pended. Please review and sign. Thanks.    From: Hussein Davila  To: Colleen Weiler  Sent: 4/12/2024  9:23 AM CDT  Subject: Referral for Dr. Boco Dr. Weiler,     I have a follow up w/ Dr. Shankar on 4/23/24, can you please process a referral for this appt.?     Things are going ok, I start PT next week.     Thanks,     Alphonse

## 2024-04-12 NOTE — TELEPHONE ENCOUNTER
Spoke to patient (verified Name and ) and relayed Dr. Weiler's message below. Patient verbalized understanding and had no further questions at this time.

## 2024-04-12 NOTE — TELEPHONE ENCOUNTER
I think we miscommunicated.  I thought he was taking 300mg TID. He has not gone over max dosage with 900mg TID though. It is just a high dose and can be sedating. I would just encourage him not to drive at all while on this medication. I sent the new scriptin

## 2024-04-12 NOTE — TELEPHONE ENCOUNTER
Refill passed per [facility] protocol.  Requested Prescriptions   Pending Prescriptions Disp Refills    gabapentin 300 MG Oral Cap 90 capsule 0     Sig: Take 1 capsule (300 mg total) by mouth 3 (three) times daily.       Neurology Medications Passed - 4/12/2024 11:26 AM        Passed - In person appointment or virtual visit in the past 6 mos or appointment in next 3 mos     Recent Outpatient Visits              3 days ago Cervical radiculopathy at [address] Skyler Shankar MD    Office Visit    4 days ago Cervical radiculopathy    [facility] Weiler, Colleen M, DO    Telemedicine    1 week ago Cervical radiculopathy    [facility] Weiler, Colleen M, DO    Telemedicine    1 week ago Neck pain    [facility] oRyal Hernández MD    Telemedicine    3 months ago Colon cancer screening    [facility]    Nurse Only          Future Appointments         Provider Department Appt Notes    In 1 week Skyler Shankar MD [facility] PT Follow up  Needs referral for f/up vist , patient was made aware lz    In 3 weeks ANDREAE LIGHT [facility] CLN w/MAC @ [facility]                     Refused Prescriptions Disp Refills    gabapentin 300 MG Oral Cap 90 capsule 0     Sig: Take 1 capsule (300 mg total) by mouth 3 (three) times daily.       Neurology Medications Passed - 4/12/2024 11:26 AM        Passed - In person appointment or virtual visit in the past 6 mos or appointment in next 3 mos     Recent Outpatient Visits              3 days ago Cervical radiculopathy at [address] Skyler Shankar MD    Office Visit    4 days ago Cervical radiculopathy    [facility]  Weiler, Colleen M, DO    Telemedicine    1 week ago Cervical radiculopathy    [facility] Weiler, Colleen M, DO    Telemedicine    1 week ago Neck pain    [facility] Royal Hernández MD    Telemedicine    3 months ago Colon cancer screening    [facility]    Nurse Only          Future Appointments         Provider Department Appt Notes    In 1 week Skyler Shankar MD [facility] PT Follow up  Needs referral for f/up vist , patient was made aware lz    In 3 weeks [facility] CLN w/MAC @ [facility]                       Recent Outpatient Visits              3 days ago Cervical radiculopathy at [address], [place] Skyler Shankar MD    Office Visit    4 days ago Cervical radiculopathy    [facility] Weiler, Colleen M, DO    Telemedicine    1 week ago Cervical radiculopathy    [facility] Weiler, Colleen M, DO    Telemedicine    1 week ago Neck pain    [facility] Royal Hernández MD    Telemedicine    3 months ago Colon cancer screening    [facility]    Nurse Only          Future Appointments         Provider Department Appt Notes    In 1 week Skyler Shankar MD [facility] PT Follow up  Needs referral for f/up vist , patient was made aware lz    In 3 weeks [facility] CLN w/MAC @ [facility]

## 2024-04-12 NOTE — TELEPHONE ENCOUNTER
Patient called, stated he talked to Dr Lozano two weeks ago and was told to increase his gabapentin to 3 tablets three times a day.  A script was sent to pharmacy today for 300 mg, one tablet three times a day, #270.  They won't let him pick it up because they told him it's too soon.    Per chart he had gabapentin 300 mg, #90 prescribed 4/02/2029.  This RN not finding documentation to verify 900 mg TID dosing.    Message sent to Dr Weiler and to pod mate Dr Metcalf, please advise if new script needs to be sent to show 300 mg, three tablets (900 mg total) taken three times a day?  Patient is out of medication.          Medication Quantity Refills Start End   gabapentin 300 MG Oral Cap 270 capsule 3 4/12/2024 --   Sig:   Take 1 capsule (300 mg total) by mouth 3 (three) times daily.     Route:   Oral     Order #:   [id number]           Televisit 4/08/2024  Assessment:/Plan:       Encounter Diagnoses   Name Primary?    Cervical radiculopathy Yes    Abnormal MRI, cervical spine          Reviewed MRI with patient and abnormalities.  Will see Neurosurgery tomorrow.  Will stay on current medication regimen right now.  Will update me with information tomorrow.

## 2024-04-15 NOTE — TELEPHONE ENCOUNTER
Please review; protocol failed/No Protocol    LOV: telemedicine 04/08/2024    Requested Prescriptions   Pending Prescriptions Disp Refills    cyclobenzaprine 10 MG Oral Tab 30 tablet 0     Sig: Take 1 tablet (10 mg total) by mouth 3 (three) times daily as needed for Muscle spasms.       There is no refill protocol information for this order        Future Appointments         Provider Department Appt Notes    In 1 week Skyler Shankar MD [facility] PT Follow up  Needs referral for f/up vist , patient was made aware lz    In 3 weeks ANDREEA LIGHT [facility] CLN w/MAC @ [facility]          Recent Outpatient Visits              6 days ago Cervical radiculopathy at [address] Skyler Shankar MD    Office Visit    1 week ago Cervical radiculopathy    [facility] Weiler, Colleen M, DO    Telemedicine    1 week ago Cervical radiculopathy    [facility] Weiler, Colleen M, DO    Telemedicine    2 weeks ago Neck pain    [facility] Royal Hernández MD    Telemedicine    3 months ago Colon cancer screening    [facility]    Nurse Only

## 2024-04-16 ENCOUNTER — PATIENT MESSAGE (OUTPATIENT)
Dept: FAMILY MEDICINE CLINIC | Facility: CLINIC | Age: 44
End: 2024-04-16

## 2024-04-16 DIAGNOSIS — M54.12 CERVICAL RADICULOPATHY: Primary | ICD-10-CM

## 2024-04-16 RX ORDER — CYCLOBENZAPRINE HCL 10 MG
10 TABLET ORAL 3 TIMES DAILY PRN
Qty: 30 TABLET | Refills: 0 | Status: SHIPPED | OUTPATIENT
Start: 2024-04-16

## 2024-04-17 DIAGNOSIS — M54.2 NECK PAIN: ICD-10-CM

## 2024-04-17 DIAGNOSIS — M54.12 CERVICAL RADICULOPATHY: ICD-10-CM

## 2024-04-17 NOTE — TELEPHONE ENCOUNTER
Dr. Weiler, please see patient's question. Rn does not see any notes on this topic.    From: Hussein Davila  To: Colleen Weiler  Sent: 4/16/2024  5:47 PM CDT  Subject: Meds and driving    Dr. Weiler,    Which med, or combo of meds that I am taking are preventing me from driving?     Opal is taking 100% of that load and it is a lot on her. So if I can find a way to get driving again, I would like that.     Thank you,    Alphonse

## 2024-04-17 NOTE — TELEPHONE ENCOUNTER
Spoke with patient and he stated he needs the referral due to cervical radiculopathy.  Referral pended  Please sign if appropriate

## 2024-04-17 NOTE — TELEPHONE ENCOUNTER
From: Hussein Davila  To: Colleen Weiler  Sent: 4/16/2024 4:23 PM CDT  Subject: [place] Referral    Dr. Weiler,    Can you please process a chiropractic referral for Dr. Benito To     He suggested 8-12 visits.     Thank you,    Alphonse

## 2024-04-18 ENCOUNTER — PATIENT MESSAGE (OUTPATIENT)
Dept: FAMILY MEDICINE CLINIC | Facility: CLINIC | Age: 44
End: 2024-04-18

## 2024-04-18 RX ORDER — HYDROCODONE BITARTRATE AND ACETAMINOPHEN 5; 325 MG/1; MG/1
1 TABLET ORAL EVERY 4 HOURS PRN
Qty: 30 TABLET | Refills: 0 | Status: SHIPPED | OUTPATIENT
Start: 2024-04-18

## 2024-04-18 NOTE — TELEPHONE ENCOUNTER
Already addressed =see refill encounter 4/17/24.     From: Hussein Davila  To: Colleen Weiler  Sent: 4/18/2024  7:00 AM CDT  Subject: Norco refill    Good morning,    Can you please refill this RX I’m out of pills.      Thank you,    Alphonse

## 2024-04-19 DIAGNOSIS — M54.2 NECK PAIN: ICD-10-CM

## 2024-04-19 DIAGNOSIS — M54.12 CERVICAL RADICULOPATHY: ICD-10-CM

## 2024-04-22 RX ORDER — GABAPENTIN 100 MG/1
CAPSULE ORAL
COMMUNITY
Start: 2024-04-20 | End: 2024-04-23

## 2024-04-22 RX ORDER — DIAZEPAM 5 MG/1
5 TABLET ORAL 3 TIMES DAILY PRN
Qty: 30 TABLET | Refills: 0 | Status: SHIPPED | OUTPATIENT
Start: 2024-04-22

## 2024-04-22 NOTE — TELEPHONE ENCOUNTER
Please review. Protocol Failed; No Protocol    Recent fills: 3/28/2024 quantity 20, 4/2/2024 quantity 30, 4/11/2024 quantity 30  Last Rx written: 4/9/2024  LOV: 1/2/2024        Requested Prescriptions   Pending Prescriptions Disp Refills    diazePAM 5 MG Oral Tab 30 tablet 0     Sig: Take 1 tablet (5 mg total) by mouth 3 (three) times daily as needed (pain/spasm).       Controlled Substance Medication Failed - 4/19/2024  6:31 PM        Failed - This medication is a controlled substance - forward to provider to refill               Future Appointments         Provider Department Appt Notes    Tomorrow Skyler Shankar MD [facility] PT Follow up  Needs referral for f/up vist , patient was made aware lz  *MR    In 2 weeks KULDEEP, PROCEDURE [facility] CLN w/MAC @ [facility]          Recent Outpatient Visits              1 week ago Cervical radiculopathy at [address] Skyler Shankar MD    Office Visit    2 weeks ago Cervical radiculopathy    [facility] Weiler, Colleen M, DO    Telemedicine    2 weeks ago Cervical radiculopathy    [facility] Weiler, Colleen M, DO    Telemedicine    3 weeks ago Neck pain    [facility] Royal Hernández MD    Telemedicine    3 months ago Colon cancer screening    [facility]    Nurse Only

## 2024-04-22 NOTE — TELEPHONE ENCOUNTER
Patient calling regarding refill. I advised that medication was approved and sent to [facility] today. He verbalized understanding.

## 2024-04-23 ENCOUNTER — TELEPHONE (OUTPATIENT)
Dept: FAMILY MEDICINE CLINIC | Facility: CLINIC | Age: 44
End: 2024-04-23

## 2024-04-23 ENCOUNTER — OFFICE VISIT (OUTPATIENT)
Dept: SURGERY | Facility: CLINIC | Age: 44
End: 2024-04-23
Payer: COMMERCIAL

## 2024-04-23 VITALS
WEIGHT: 223 LBS | DIASTOLIC BLOOD PRESSURE: 90 MMHG | HEART RATE: 108 BPM | SYSTOLIC BLOOD PRESSURE: 136 MMHG | BODY MASS INDEX: 31.22 KG/M2 | HEIGHT: 71 IN

## 2024-04-23 DIAGNOSIS — M50.20 CERVICAL DISC HERNIATION: ICD-10-CM

## 2024-04-23 DIAGNOSIS — M54.2 NECK PAIN: ICD-10-CM

## 2024-04-23 DIAGNOSIS — M54.12 CERVICAL RADICULOPATHY AT C6: Primary | ICD-10-CM

## 2024-04-23 DIAGNOSIS — M54.12 CERVICAL RADICULOPATHY: ICD-10-CM

## 2024-04-23 PROCEDURE — 3008F BODY MASS INDEX DOCD: CPT | Performed by: NEUROLOGICAL SURGERY

## 2024-04-23 PROCEDURE — 99215 OFFICE O/P EST HI 40 MIN: CPT | Performed by: NEUROLOGICAL SURGERY

## 2024-04-23 PROCEDURE — 3075F SYST BP GE 130 - 139MM HG: CPT | Performed by: NEUROLOGICAL SURGERY

## 2024-04-23 PROCEDURE — 3080F DIAST BP >= 90 MM HG: CPT | Performed by: NEUROLOGICAL SURGERY

## 2024-04-23 NOTE — PROGRESS NOTES
[facility] Neurosurgery        Center for [facility]      [address]  [address]  [place] [address]    PHONE  [phone]          FAX  [phone]    [email or web address]      OFFICE FOLLOW-UP NOTE            Hussein Davila    : 1980    MRN: [id number]  CSN: [id number]      PCP: Colleen Weiler, DO  Referring Provider: Carolynn Knight    Insurance: Payor: [facility] HMO / Plan: [facility] BLUE ADVANTAGE HMO / Product Type: HMO /           Date of Visit: 2024    Reason for Visit:                     History of Present Care:  Hussein Davila is a a(n) 43 year old, male returns to the office for follow-up appointment.  Patient now endorses 4 weeks of severe left upper extremity pain which radiates into the first 3 digits of the left hand.  He is having difficulty performing daily activities as well as completing his job.  He has been utilizing Norco, gabapentin, cyclobenzaprine and Valium for his extreme pain.  He denies symptoms consistent with myelopathy including loss of balance or dropping items.  He does have numbness to the left upper extremity first 3 digits but denies weakness.  He reports no right upper extremity or lower extremity involvement.  He denies bowel or bladder issues.    History:   Past Medical History:    ADHD    Allergic rhinitis    Asthma (HCC)    Cats    Dyslipidemia    Eczema    Essential hypertension    High blood pressure    Hypertension    Visual impairment    glasses/contacts      Past Surgical History:   Procedure Laterality Date    Other  Childhood    Bilateral myringotomy tubes    Vasectomy        Family History   Problem Relation Age of Onset    Asthma Mother     Other (Other) Mother         asthma    Diabetes Father     Hypertension Father     Other (CVA) Father         Age 70s    Diabetes Brother     Colon Cancer Paternal Grandmother     Heart Disease Maternal Grandfather           age 60s      Social History     Socioeconomic History    Marital status:      Spouse name: Not on file    Number of children: Not on file    Years of education: Not on file    Highest education level: Not on file   Occupational History    Occupation: Manager   Tobacco Use    Smoking status: Former     Current packs/day: 0.00     Average packs/day: 1 pack/day for 6.0 years (6.0 ttl pk-yrs)     Types: Cigarettes     Start date: 3/20/1999     Quit date: 3/20/2005     Years since quittin.1     Passive exposure: Never    Smokeless tobacco: Current   Vaping Use    Vaping status: Some Days   Substance and Sexual Activity    Alcohol use: Yes     Comment: Occasional beer    Drug use: Yes     Types: Cannabis     Comment: vape    Sexual activity: Not on file   Other Topics Concern     Service Not Asked    Blood Transfusions Not Asked    Caffeine Concern Yes     Comment: coffee    Occupational Exposure Not Asked    Hobby Hazards Not Asked    Sleep Concern Not Asked    Stress Concern Not Asked    Weight Concern Not Asked    Special Diet Not Asked    Back Care Not Asked    Exercise Not Asked    Bike Helmet Not Asked    Seat Belt Not Asked    Self-Exams Not Asked   Social History Narrative    Not on file     Social Determinants of Health     Financial Resource Strain: Not on file   Food Insecurity: Not on file   Transportation Needs: Not on file   Physical Activity: Not on file   Stress: Not on file   Social Connections: Not on file   Housing Stability: Not on file        Allergies:  Allergies   Allergen Reactions    Crab (Diagnostic) HIVES         Medications:  Current Outpatient Medications   Medication Sig Dispense Refill    diazePAM 5 MG Oral Tab Take 1 tablet (5 mg total) by mouth 3 (three) times daily as needed (pain/spasm). 30 tablet 0    HYDROcodone-acetaminophen 5-325 MG Oral Tab Take 1 tablet by mouth every 4 (four) hours as needed for Pain. 30 tablet 0    cyclobenzaprine 10 MG Oral Tab Take 1  tablet (10 mg total) by mouth 3 (three) times daily as needed for Muscle spasms. 30 tablet 0    gabapentin 300 MG Oral Cap Take 3 capsules (900 mg total) by mouth 3 (three) times daily. 270 capsule 0    Mometasone Furoate (ELOCON) 0.1 % External Cream Apply 1 Application  topically 2 (two) times daily as needed. 50 g 1    Atomoxetine HCl 80 MG Oral Cap Take by mouth daily.      naproxen 500 MG Oral Tab Take 1 tablet (500 mg total) by mouth 2 (two) times daily as needed. 20 tablet 0    montelukast 10 MG Oral Tab Take 1 tablet (10 mg total) by mouth nightly. 90 tablet 3    amLODIPine 10 MG Oral Tab Take 1 tablet (10 mg total) by mouth once daily. (Patient taking differently: Take 1 tablet (10 mg total) by mouth every morning.) 90 tablet 3    polyethylene glycol, PEG 3350-KCl-NaBcb-NaCl-NaSulf, 236 g Oral Recon Soln Please take as directed per the written instructions from the GI office 4000 mL 0    Cholecalciferol (VITAMIN D3) 25 MCG (1000 UT) Oral Cap Take 1 tablet by mouth daily.      cetirizine 10 MG Oral Tab Take 1 tablet (10 mg total) by mouth daily. (Patient taking differently: Take 1 tablet (10 mg total) by mouth every morning.) 90 tablet 3    busPIRone 15 MG Oral Tab Take 1 tablet (15 mg total) by mouth 2 (two) times daily.      lisinopril-hydroCHLOROthiazide 10-12.5 MG Oral Tab Take 1 tablet by mouth daily. 90 tablet 3    Multiple Vitamins-Minerals (MENS MULTI VITAMIN & MINERAL) Oral Tab Take by mouth.          Review of Systems:  A 10-point system was reviewed.  Pertinent positives and negatives are noted in HPI.      Physical Exam:  /90   Pulse 108   Ht 71\"   Wt 223 lb (101.2 kg)   BMI 31.10 kg/m²         Neurological Exam:      Motor Strength:  There is mild minus 5 out of 5 weakness in the left biceps relative to the right    Sensation to light touch:  Numbness to light touch in left upper extremity first digit  Right upper extremity normal    DTRs:  Absent left brachial radialis  Otherwise 2+  out of 4    Long tract signs:  Negative Olegario in bilateral upper extremities      Abdomen:  Soft, non-distended, non-tender, with no rebound or guarding.  No peritoneal signs.     Extremities:  Non-tender, no lower extremity edema noted.      Labs:  CBC:  Lab Results   Component Value Date    WBC 12.1 (H) 01/02/2024    HGB 16.9 01/02/2024    HCT 48.5 01/02/2024    MCV 87.9 01/02/2024    .0 01/02/2024      BMG:  Lab Results   Component Value Date     (L) 01/02/2024    K 3.6 01/02/2024    CO2 29.0 01/02/2024     01/02/2024    BUN 12 01/02/2024      INR:  No results found for: \"INR\", \"PROTIME\"       Diagnostics:  MRI cervical spine 4/4/2024 reviewed:  There is proper alignment of the cervical vertebral bodies and maintenance of regional cervical lordosis.  At C5-6 there is a large disc extrusion towards the left causing lateral recess stenosis.  At C6/7 there is degenerative disc disease with foraminal narrowing bilaterally.    Diagnosis:  1. Cervical radiculopathy at C6    2. Cervical disc herniation      Assessment/Plan:  Hussein Davila returns to the office for follow-up after initiating physical therapy.  Patient has a left upper extremity C6 radiculopathy which is unremitting. He initiated physical therapy which has made his pain worse.  In fact, he experienced a second-degree burn of his left hand as he dropped his hot coffee because of left upper extremity apraxia.  He is utilizing a host of pain medications for comfort including gabapentin, cyclobenzaprine, Valium, hydrocodone.  Due to his left upper extremity unremitting radiculopathy in spite of physical therapy, we discussed surgical options in the form of anterior cervical discectomy and fusion versus cervical disc arthroplasty.  Patient has proper cervical alignment and would be a candidate for disc arthroplasty.  We discussed risks and benefits of procedure including infection,  nerve injury, need for more surgery,  loss of voice, difficulty swallowing.  Patient understands the risks of the procedure and would like to move forward.  We will be completing this procedure at the [facility] surgery center on 5/15 pending preoperative clearance by primary care physician.    More than 30 minutes were spent during this visit and the coordination of this patient's care. All questions and concerns were addressed. We appreciate the opportunity to participate in the care of this patient. Please do not hesitate to call our office ([phone]) with any issues.    This note has been dictated utilizing voice recognition software. Unfortunately, this may lead to occasional typographical errors. If there are any questions regarding this, please do not hesitate to contact our office.           Tano Curran PA-C    4/23/2024  9:16 AM

## 2024-04-23 NOTE — TELEPHONE ENCOUNTER
Patient called requesting to schedule an pre-op appointment. I informed patient that Dr.Weiler is unavailable until June which is after his surgery that he stated is scheduled for 05/06/2024. He is scheduled for a video visit tomorrow 04/24/2024 with Dr.Weiler. I offered the 8am opening for a pre-op tomorrow (04/24/2024) with Carolynn Knight and he declined.and stated he would talk to Dr.Weiler during that appointment tomorrow to ask her to get him in before his surgery.

## 2024-04-23 NOTE — TELEPHONE ENCOUNTER
Spoke with patient, he is scheduled for C5-6 Artificial Disc Relacement on 5/6/2024 with Dr. Shankar at [facility]. Pre-op scheduled with Dr Weiler on 4/29/24. Noted that patient has been scheduled for a colonoscopy on 5/7/24. Patient said he forgot about it and will have to reschedule it. Also, patient would like to keep his video appointment with Dr Weiler tomorrow.

## 2024-04-23 NOTE — PATIENT INSTRUCTIONS
You are scheduled for C5-6 Artificial Disc Relacement on 5/6/2024 with Dr. Shankar at [facility]     [facility] : [address] [place] [address].     The [facility] pre-admissions team will reach out to you to prior to your surgery to review your medical history and provide you with your arrival time and day-of instructions.     PCP clearance is required within 30 days of surgery.  We have faxed a letter to Dr Weiler notifying them that you will be having surgery and need pre-operative clearance.  Please call their office to schedule an appointment.     You will also need to have blood work done and a MRSA test (nasal swab).  These labs should be done within 30 days of surgery. Walk-ins are acceptable at any of our [facility] labs.  Please go to [email or web address] if you need assistance finding the nearest lab to your home.    No blood thinning medications, over the counter non-steroidal antiinflammatories, herbal supplements (garlic,tumeric etc.), vitamin E, fish oil or krill oil for 7 days prior to surgery. The pre-admissions department will review your medications and advise if you need to stop taking any other of your medications.     You should have nothing to eat or drink after midnight the night prior to surgery.    Our office will get authorization for surgery with your insurance.     Surgery is scheduled as an outpatient procedure under general anesthesia. You will need someone to drive you home.    Hibiclens Bathing  In order to prevent post-operative infections you will need to use a pre-operative soap called Hibiclens.  You will shower with this every day for the 5 days leading up to surgery.  The last shower should be the night before surgery.   See additional instructions below.   Hibiclens comes in a large blue bottle and can  be found in most pharmacies in the First Aid supplies  Shower with this daily for FIVE consecutive days before surgery, using the entire bottle over the five days.  The last shower should be the night before surgery.   Steps to bathing with Hibiclens  Wash your hair, face and body as normal with your usual cleansers  Rinse well  Using a clean wet washcloth apply enough Hibiclens to cover your body. Wash from the neck down avoiding the genital areas and concentrating on the surgical area  Rinse well  Dry yourself with a clean, dry towel  Do not use any powders, creams, lotions or sprays on your body as these attract bacteria  Deodorant and facial creams are acceptable.   (Laundering/cleaning: Chlorhexidine gluconate skin cleansers will cause stains if used with chlorine releasing products. Rinse completely and use only non-chlorine detergents.)    REFILLS:  After surgery, please remember that we do have a 48 hour refill policy that does not include weekends, please make sure to request your medications in a timely manner so that you do not go days without medication.  *Refills should be requested through your pharmacy or through the refill request in [employer] (log in, go to medications, then select refill request).    [employer] MESSAGING:  Please remember that our office is closed during the weekend and no one is available for [employer] messages. If you have an urgent or emergent matter, please go to a walk-in center or the emergency room.  It may be several days before there is a response to your message.    FMLA/DISABILITY PAPERWORK  If you require FMLA or disability paperwork for your recovery, please make sure to either drop it off or have it faxed to our office at [phone]. Be sure the form has your name and date of birth on it.  The form will be faxed to our Forms Department and they will complete it for you.  There is a 25$ fee for all forms that need to be filled out.  Please be aware there is a 10-20 day  turnaround time.  You will need to sign a release of information (LATRICIA) form if your paperwork does not come with one.  You may call the Forms Department with any questions at [phone].  Their fax number is [phone].

## 2024-04-23 NOTE — PROGRESS NOTES
Patient is in to follow up.   Last office visit: 4/9/24  Most recent imaging dated on: 4/4/24  Physical Therapy / Injections: Patient has been compliant with Physical Therapy.  Numbness / Tingling: Patient reports numbness and tingling on both arms and fingers on left arm   Pain Level: 1/10. Patient states that they are having pain located on their neck left upper arm.

## 2024-04-24 ENCOUNTER — TELEPHONE (OUTPATIENT)
Dept: SURGERY | Facility: CLINIC | Age: 44
End: 2024-04-24

## 2024-04-24 DIAGNOSIS — M54.12 CERVICAL RADICULOPATHY AT C6: Primary | ICD-10-CM

## 2024-04-24 RX ORDER — HYDROCODONE BITARTRATE AND ACETAMINOPHEN 5; 325 MG/1; MG/1
1 TABLET ORAL EVERY 4 HOURS PRN
Qty: 30 TABLET | Refills: 0 | Status: SHIPPED | OUTPATIENT
Start: 2024-04-24

## 2024-04-24 RX ORDER — CYCLOBENZAPRINE HCL 10 MG
10 TABLET ORAL 3 TIMES DAILY PRN
Qty: 30 TABLET | Refills: 0 | Status: SHIPPED | OUTPATIENT
Start: 2024-04-24

## 2024-04-24 NOTE — TELEPHONE ENCOUNTER
Patient is scheduled for Cervical 5-6 Artificial Disc Replacement on 5/6/24 with Dr. Skyler Shankar at [facility].    N/A pre-op apt scheduled (if sx is more than 30 days from last apt)  Y pre-op apt scheduled with RN spine navigator  Y Surgical instructions reviewed by nursing staff with patient  N/A  form completed  Y Surgery order signed   Y Placed sx on surgery sheet  Y Placed on outlook calendar  Y [employer] message sent to patient with sx instructions  Y Faxed pre-op clearance request to PCP Dr. Weiler   N/A Faxed letter to prescribing provider requesting anticoagulants be held for surgery  N/A E-mail sent to [employer]  Y Post-op appointments made  [facility] to [facility] team to initiate.  N/A Post-Op outreach pt reminder placed.   Y Entire Neurosurgery Checklist Completed    Clearances: pending  PA:pending  CPT Codes: 44854, 83948, 10152, 35366

## 2024-04-25 ENCOUNTER — PATIENT MESSAGE (OUTPATIENT)
Dept: SURGERY | Facility: CLINIC | Age: 44
End: 2024-04-25

## 2024-04-26 NOTE — TELEPHONE ENCOUNTER
PATIENT NAME:  JOCELYNE MUÑOZ/ [phone] (home)/ There is no work phone number on file.  PATIENT :  1980  REFERRAL ID #:  [id number]  REFERRAL STATUS:  Authorized [1]  REVIEW REFERRAL NOTES FOR MORE INFORMATION:  DATE AUTHORIZED:  2024 // EXPIRATION DATE: 2025   REFERRED BY:  SELINA PARADA MD (TEL: [phone])  REFERRED TO: PAM STONE MD (TEL: [phone])     No vendor specified on referral. / No vendor phone number known.  No facility specified on referral  REFERRED FOR:    Diagnoses:    M54.12 (ICD-10-CM) - 723.4 (ICD-9-CM) - Cervical radiculopathy at C6  Procedures:     [id number] - [facility] CASE REQUEST SURGICAL      (CPT®) - SPIN BONE ALLOGRFT MORSELIZED      (CPT®) - SPIN BONE AUTOGRFT LOCAL      (CPT®) - CERV ARTIFIC DISKECTOMY   NUMBER OF VISITS AUTH: 3    PA approved.

## 2024-04-26 NOTE — TELEPHONE ENCOUNTER
From: Hussein Davila  To: Skyler Shankar  Sent: 4/25/2024 8:07 PM CDT  Subject: Chiropractor?     Dr. Shankar,    Should I get chiropractic care when at PT?    Thank you,    Alphonse

## 2024-04-29 ENCOUNTER — LAB ENCOUNTER (OUTPATIENT)
Dept: LAB | Age: 44
End: 2024-04-29
Attending: FAMILY MEDICINE
Payer: COMMERCIAL

## 2024-04-29 ENCOUNTER — OFFICE VISIT (OUTPATIENT)
Dept: FAMILY MEDICINE CLINIC | Facility: CLINIC | Age: 44
End: 2024-04-29
Payer: COMMERCIAL

## 2024-04-29 ENCOUNTER — TELEPHONE (OUTPATIENT)
Facility: CLINIC | Age: 44
End: 2024-04-29

## 2024-04-29 VITALS
DIASTOLIC BLOOD PRESSURE: 85 MMHG | WEIGHT: 229 LBS | BODY MASS INDEX: 32 KG/M2 | RESPIRATION RATE: 18 BRPM | HEART RATE: 102 BPM | TEMPERATURE: 98 F | OXYGEN SATURATION: 96 % | SYSTOLIC BLOOD PRESSURE: 123 MMHG

## 2024-04-29 DIAGNOSIS — M54.12 CERVICAL RADICULOPATHY: ICD-10-CM

## 2024-04-29 DIAGNOSIS — Z12.11 COLON CANCER SCREENING: Primary | ICD-10-CM

## 2024-04-29 DIAGNOSIS — M54.2 NECK PAIN: ICD-10-CM

## 2024-04-29 DIAGNOSIS — Z01.818 PRE-OP EVALUATION: Primary | ICD-10-CM

## 2024-04-29 DIAGNOSIS — R79.89 LOW TSH LEVEL: ICD-10-CM

## 2024-04-29 DIAGNOSIS — Z01.818 PRE-OP EVALUATION: ICD-10-CM

## 2024-04-29 DIAGNOSIS — R79.89 ELEVATED LFTS: ICD-10-CM

## 2024-04-29 DIAGNOSIS — I10 PRIMARY HYPERTENSION: ICD-10-CM

## 2024-04-29 LAB
ALBUMIN SERPL-MCNC: 4.3 G/DL (ref 3.2–4.8)
ALBUMIN/GLOB SERPL: 1.6 {RATIO} (ref 1–2)
ALP LIVER SERPL-CCNC: 76 U/L
ALT SERPL-CCNC: 32 U/L
ANION GAP SERPL CALC-SCNC: 4 MMOL/L (ref 0–18)
ANTIBODY SCREEN: NEGATIVE
APTT PPP: 30.8 SECONDS (ref 23.3–35.6)
AST SERPL-CCNC: 23 U/L (ref ?–34)
ATRIAL RATE: 83 BPM
BASOPHILS # BLD AUTO: 0.07 X10(3) UL (ref 0–0.2)
BASOPHILS NFR BLD AUTO: 0.7 %
BILIRUB DIRECT SERPL-MCNC: 0.2 MG/DL (ref ?–0.3)
BILIRUB SERPL-MCNC: 0.6 MG/DL (ref 0.3–1.2)
BUN BLD-MCNC: 10 MG/DL (ref 9–23)
BUN/CREAT SERPL: 11.2 (ref 10–20)
CALCIUM BLD-MCNC: 9.8 MG/DL (ref 8.7–10.4)
CHLORIDE SERPL-SCNC: 109 MMOL/L (ref 98–112)
CO2 SERPL-SCNC: 29 MMOL/L (ref 21–32)
CREAT BLD-MCNC: 0.89 MG/DL
DEPRECATED RDW RBC AUTO: 39.8 FL (ref 35.1–46.3)
EGFRCR SERPLBLD CKD-EPI 2021: 108 ML/MIN/1.73M2 (ref 60–?)
EOSINOPHIL # BLD AUTO: 0.22 X10(3) UL (ref 0–0.7)
EOSINOPHIL NFR BLD AUTO: 2.2 %
ERYTHROCYTE [DISTWIDTH] IN BLOOD BY AUTOMATED COUNT: 12.7 % (ref 11–15)
FASTING STATUS PATIENT QL REPORTED: YES
GLOBULIN PLAS-MCNC: 2.7 G/DL (ref 2.8–4.4)
GLUCOSE BLD-MCNC: 92 MG/DL (ref 70–99)
HCT VFR BLD AUTO: 43 %
HGB BLD-MCNC: 14.5 G/DL
IMM GRANULOCYTES # BLD AUTO: 0.06 X10(3) UL (ref 0–1)
IMM GRANULOCYTES NFR BLD: 0.6 %
INR BLD: 0.91 (ref 0.8–1.2)
LYMPHOCYTES # BLD AUTO: 2.63 X10(3) UL (ref 1–4)
LYMPHOCYTES NFR BLD AUTO: 25.9 %
MCH RBC QN AUTO: 29.1 PG (ref 26–34)
MCHC RBC AUTO-ENTMCNC: 33.7 G/DL (ref 31–37)
MCV RBC AUTO: 86.3 FL
MONOCYTES # BLD AUTO: 0.64 X10(3) UL (ref 0.1–1)
MONOCYTES NFR BLD AUTO: 6.3 %
NEUTROPHILS # BLD AUTO: 6.55 X10 (3) UL (ref 1.5–7.7)
NEUTROPHILS # BLD AUTO: 6.55 X10(3) UL (ref 1.5–7.7)
NEUTROPHILS NFR BLD AUTO: 64.3 %
OSMOLALITY SERPL CALC.SUM OF ELEC: 293 MOSM/KG (ref 275–295)
P AXIS: 28 DEGREES
P-R INTERVAL: 138 MS
PLATELET # BLD AUTO: 438 10(3)UL (ref 150–450)
POTASSIUM SERPL-SCNC: 4.1 MMOL/L (ref 3.5–5.1)
PROT SERPL-MCNC: 7 G/DL (ref 5.7–8.2)
PROTHROMBIN TIME: 12.8 SECONDS (ref 11.6–14.8)
Q-T INTERVAL: 382 MS
QRS DURATION: 114 MS
QTC CALCULATION (BEZET): 448 MS
R AXIS: -22 DEGREES
RBC # BLD AUTO: 4.98 X10(6)UL
RH BLOOD TYPE: POSITIVE
SODIUM SERPL-SCNC: 142 MMOL/L (ref 136–145)
T AXIS: 39 DEGREES
T4 FREE SERPL-MCNC: 1.2 NG/DL (ref 0.8–1.7)
TSI SER-ACNC: 0.26 MIU/ML (ref 0.55–4.78)
VENTRICULAR RATE: 83 BPM
WBC # BLD AUTO: 10.2 X10(3) UL (ref 4–11)

## 2024-04-29 PROCEDURE — 87147 CULTURE TYPE IMMUNOLOGIC: CPT

## 2024-04-29 PROCEDURE — 85610 PROTHROMBIN TIME: CPT

## 2024-04-29 PROCEDURE — 80053 COMPREHEN METABOLIC PANEL: CPT

## 2024-04-29 PROCEDURE — 36415 COLL VENOUS BLD VENIPUNCTURE: CPT | Performed by: FAMILY MEDICINE

## 2024-04-29 PROCEDURE — 86900 BLOOD TYPING SEROLOGIC ABO: CPT | Performed by: FAMILY MEDICINE

## 2024-04-29 PROCEDURE — 86901 BLOOD TYPING SEROLOGIC RH(D): CPT | Performed by: FAMILY MEDICINE

## 2024-04-29 PROCEDURE — 84439 ASSAY OF FREE THYROXINE: CPT

## 2024-04-29 PROCEDURE — 85025 COMPLETE CBC W/AUTO DIFF WBC: CPT

## 2024-04-29 PROCEDURE — 86850 RBC ANTIBODY SCREEN: CPT | Performed by: FAMILY MEDICINE

## 2024-04-29 PROCEDURE — 85730 THROMBOPLASTIN TIME PARTIAL: CPT

## 2024-04-29 PROCEDURE — 87081 CULTURE SCREEN ONLY: CPT

## 2024-04-29 PROCEDURE — 82248 BILIRUBIN DIRECT: CPT

## 2024-04-29 PROCEDURE — 84443 ASSAY THYROID STIM HORMONE: CPT

## 2024-04-29 NOTE — PROGRESS NOTES
HPI: Hussein is a 43 year old male who presents for pre-op physical. Having C5-6 artificial disc replacement on 5/6 by Dr. Shankar at [facility]. Did 1-2 weeks of PT but pain has not improved at all.  Still taking multiple medications for pain. Outpatient and then will have physical therapy.     Has mild intermittent asthma around cats.  Controlled.     HTN controlled. Takes Amlodipine and Lisinopril-hydrochlorothiazide.     Follows with Psychiatry for ADHD and anxiety.    Denies smoking, alcohol use, drug use.  Vapes cannibis daily. Performs ADLs.     Pt has had surgery before. Has had general anesthesia and sedation without concerns.     PMH:    Past Medical History:    ADHD    Allergic rhinitis    Asthma (HCC)    Cats    Dyslipidemia    Eczema    Essential hypertension    High blood pressure    Hypertension    Visual impairment    glasses/contacts      Alg:  Crab (diagnostic)   Meds:   Current Outpatient Medications on File Prior to Visit   Medication Sig Dispense Refill    cyclobenzaprine 10 MG Oral Tab Take 1 tablet (10 mg total) by mouth 3 (three) times daily as needed for Muscle spasms. 30 tablet 0    HYDROcodone-acetaminophen 5-325 MG Oral Tab Take 1 tablet by mouth every 4 (four) hours as needed for Pain. 30 tablet 0    diazePAM 5 MG Oral Tab Take 1 tablet (5 mg total) by mouth 3 (three) times daily as needed (pain/spasm). 30 tablet 0    gabapentin 300 MG Oral Cap Take 3 capsules (900 mg total) by mouth 3 (three) times daily. 270 capsule 0    Mometasone Furoate (ELOCON) 0.1 % External Cream Apply 1 Application  topically 2 (two) times daily as needed. 50 g 1    Atomoxetine HCl 80 MG Oral Cap Take by mouth daily.      montelukast 10 MG Oral Tab Take 1 tablet (10 mg total) by mouth nightly. 90 tablet 3    amLODIPine 10 MG Oral Tab Take 1 tablet (10 mg total) by mouth once daily. (Patient taking differently: Take 1 tablet (10 mg total) by mouth every morning.) 90 tablet 3    polyethylene glycol, PEG  3350-KCl-NaBcb-NaCl-NaSulf, 236 g Oral Recon Soln Please take as directed per the written instructions from the GI office 4000 mL 0    Cholecalciferol (VITAMIN D3) 25 MCG (1000 UT) Oral Cap Take 1 tablet by mouth daily.      cetirizine 10 MG Oral Tab Take 1 tablet (10 mg total) by mouth daily. (Patient taking differently: Take 1 tablet (10 mg total) by mouth every morning.) 90 tablet 3    busPIRone 15 MG Oral Tab Take 1 tablet (15 mg total) by mouth 2 (two) times daily.      lisinopril-hydroCHLOROthiazide 10-12.5 MG Oral Tab Take 1 tablet by mouth daily. 90 tablet 3    Multiple Vitamins-Minerals (MENS MULTI VITAMIN & MINERAL) Oral Tab Take by mouth.       No current facility-administered medications on file prior to visit.      Tobacco Use: no    ROS:   Allergic/Immuno:  Negative for environmental allergies and food allergies  Cardiovascular:  Negative for chest pain and irregular heartbeat/palpitations  Constitutional:  Negative for decreased activity, fever, irritability and lethargy  Endocrine:  Negative for abnormal sleep patterns, increased activity, polydipsia and polyphagia  ENMT:  Negative for ear drainage, hearing loss and nasal drainage  Eyes:  Negative for eye discharge and vision loss  Gastrointestinal:  Negative for abdominal pain, constipation, decreased appetite, diarrhea and vomiting  Genitourinary:  Negative for dysuria and hematuria  Hema/Lymph:  Negative for easy bleeding and easy bruising  Integumentary:  Negative for pruritus and rash  Musculoskeletal:  Negative for bone/joint symptoms  Neurological:  Negative for gait disturbance  Psychiatric:  Negative for inappropriate interaction and psychiatric symptoms      Objective:   Gen: AOx3. NAD.   /85   Pulse 102   Temp 97.5 °F (36.4 °C) (Temporal)   Resp 18   Wt 229 lb (103.9 kg)   SpO2 96%   BMI 31.94 kg/m²   HEENT: Conjunctive clear.  Alber ear canals clear.  Alber TMs intact with good landmarks noted.  Nares patent.  Oral mucous membrane  moist.  Normal lips, teeth, and gums.  Oropharynx normal.  Neck supple.  Good ROM.  No LAD.  Thyroid normal.  CV:  Regular rate and rhythm; no murmurs  Lungs:  Clear to ausculation; good aeration               No wheezes, rales or rhonchi  Abd: soft, non-tender, non-distended          Normal bowel sounds; no masses          No hepatosplenomegaly  Extremities: No cyanosis, clubbing, edema.  Pedal pulses 2+ homa.    EKG: normal sinus rhythm            Rate 114        Assessment:/Plan:  Encounter Diagnoses   Name Primary?    Pre-op evaluation Yes    Primary hypertension        HTN controlled.  Pt has had no reaction to general anesthesia before.  He does vape cannibis daily.  Will get labs today.  Patient is an acceptable risk for surgery.     Colleen Weiler, DO

## 2024-04-29 NOTE — TELEPHONE ENCOUNTER
Left Message To Call Back to re-schedule CLN procedure          TE 1/10/2024:  cancelled for: Colonoscopy 26702  Provider Name:  Dr. Magdaleno  Date:  5/7/2024  Location:  [facility]  Sedation:  MAC  Time: 2:15 PM - Patient is aware [facility] will call the day before with arrival time.  Prep:  Suprep  Meds/Allergies Reconciled?:  Physician reviewed  Diagnosis with codes:  Colon cancer screening Z12.11   Was patient informed to call insurance with codes (Y/N):  Yes, [facility] with patient.  Referral sent?:  Referral was sent at the time of electronic surgical scheduling.  EM or [facility] notified?:  I sent an electronic request to Endo Scheduling and received a confirmation today.

## 2024-04-29 NOTE — TELEPHONE ENCOUNTER
Schedulers: Can you reach out to pt to reschedule screening colonoscopy?Thanks!     RN canceled colonoscopy as requested. RN submitted surgical case change request and canceled in appt desk.       From: Hussein Davila  To: Snehal Magdaleno  Sent: 4/23/2024  2:19 PM CDT  Subject: Reschedule    Hi,    I have a bulging disc between my c5 and c6 cervical vertebrae and I need surgery to fix it. That is scheduled for 5/6/24.    I am sorry, but I need to reschedule my colonoscopy. Please let me know the best way to proceed.    Thank you,    Alphonse

## 2024-05-01 ENCOUNTER — NURSE ONLY (OUTPATIENT)
Dept: SURGERY | Facility: CLINIC | Age: 44
End: 2024-05-01
Payer: COMMERCIAL

## 2024-05-01 DIAGNOSIS — Z71.9 ENCOUNTER FOR EDUCATION: Primary | ICD-10-CM

## 2024-05-01 RX ORDER — DIAZEPAM 5 MG/1
5 TABLET ORAL 3 TIMES DAILY PRN
Qty: 30 TABLET | Refills: 0 | Status: SHIPPED | OUTPATIENT
Start: 2024-05-01

## 2024-05-01 NOTE — TELEPHONE ENCOUNTER
Protocol Failed/ No Protocol    Requested Prescriptions   Pending Prescriptions Disp Refills    diazePAM 5 MG Oral Tab 30 tablet 0     Sig: Take 1 tablet (5 mg total) by mouth 3 (three) times daily as needed (pain/spasm).       Controlled Substance Medication Failed - 4/29/2024  3:16 PM        Failed - This medication is a controlled substance - forward to provider to refill             Future Appointments         Provider Department Appt Notes    Tomorrow NATACHA RN SPINE NAVIGATOR [facility] sx: 5/6/2024    In 3 weeks Tano Curran PA-C [facility] 2 wk post op, sx 5/6/24    In 1 month Tano Curran PA-C [facility] 6 wk post op, sx: 5/6/24          Recent Outpatient Visits              Yesterday Pre-op evaluation    [facility] Weiler, Colleen M, DO    Office Visit    1 week ago Cervical radiculopathy at C6    [facility], Skyler Miller MD    Office Visit    3 weeks ago Cervical radiculopathy at C6    [facility], Skyler Miller MD    Office Visit    3 weeks ago Cervical radiculopathy    [facility] [place], [place]    Telemedicine    3 weeks ago Cervical radiculopathy    [facility] We[place], Colleen M, DO    Telemedicine

## 2024-05-01 NOTE — TELEPHONE ENCOUNTER
Patient MSSA +.  RN spine navigator discussed with pt at apt today.  Bactroban sent to pharmacy.  MC msg sent.

## 2024-05-01 NOTE — PROGRESS NOTES
RN Spine Navigator Education for [place]     If you have received instructions from your surgeon that are different from those listed below, please follow your physician's instructions.    You are scheduled for a Arthroplasty with Dr. Shankar on 5/1.      Patient Surgical Goals: Decreased pain     Before Your Surgery    Choose a Care Partner  Patient attended spine navigator visit with care partner.  Care partner is Opal. Your care partner(s) should be able to provide transportation to and from the hospital. They should be able to help at home for the first week after discharge, including helping you with meals, medication, and dressing changes. It is strongly recommended that someone stays with you for 24 hours after anesthesia if going home the day of surgery.    Clearance Before Surgery  You will need to see your primary care provider within 30 days before surgery. Please make sure this appointment is at least a week before surgery as more testing or doctor visits may be ordered. Presurgical testing may include labs, nasal swab, imaging, EKG.   Make sure that you complete all preadmission testing so that surgery does not get delayed.    Home Environment  Assessed home status: home has stairs, bedroom and bathroom are on first floor, and patient has dependents at home. Suggested arrangements for childcare .  It is recommended that you prepare your home by putting clean sheets on your bed, freezing meals, and putting frequently used items at waist level.   Prevent falls by removing items that could cause you to trip, adding nightlights and adding a nonskid mat in shower.   Assistive devices can be purchased at a medical supply store or online including canes, walkers, toileting devices (commodes, raised toilet seats, toilet paper wiping aid), long handled sponge, shower chair or tub transfer bench, grabber/reacher tool, sock aid, long handled shoehorn, if needed.      Tobacco, Nicotine and Marijuana Use   No marijuana  products for 24 hours before anesthesia or while taking narcotic medications    Medications to Stop   For 7-10 days before surgery do not take any blood thinning medications. This includes non-steroidal anti-inflammatories or NSAIDs (Advil, Aleve, Motrin, ibuprofen, naproxen, meloxicam, diclofenac, celecoxib, etc.), aspirin (unless told otherwise by your cardiologist or surgeon), herbal supplements and vitamins (garlic, turmeric, vitamin E, fish oil or krill oil, etc.). You may only take Tylenol or prescribed narcotic medication if needed for pain.   Other medications to stop include: hold ACE/ARBS day of surgery    Leading Up to Day of Surgery  Five days before surgery wash with Hibiclens soap after your regular body soap every day. Do not put use Hibiclens on your face, hair or privates. Your last shower should be the night before surgery. and use mupirocin (Bactroban) if prescribed.   One-two business days before surgery, the preadmission testing staff will call you and let you know what time to arrive, where to park and will provide any additional instructions.   After 11pm the day before surgery, do not eat or drink anything (including water, gum, or candies).    Items to Bring to the Hospital   Bring insurance card, ID, advanced directive, or medical power of  paperwork, loose fitting clothing, shoes with a back that can accommodate swollen feet, long phone charging cord, glasses.  Do not bring jewelry, valuables, or medications.     In the Hospital     Operative Day and Hospital Stay  In the preoperative area, you will change into a gown, have an IV placed in your hand or arm by the nurse, and sign any consent paperwork that is needed for your procedure. You will speak to the surgeon and anesthesiologist. It is important to tell the doctors and nurses if you have had any significant side effects from pain medications or anesthesia such as a rash or severe nausea.    In the operating room, the  anesthesiologist will attach monitors, give you oxygen through a mask, and give you medicine through the IV to fall asleep. After you are sleeping, the breathing tube will be placed. You will wake up on the stretcher.     During the surgery, your care partner can sit in the surgical waiting area.     In the recovery room, monitors will be attached that check your heart rate, blood pressure and oxygen levels. While you may not remember this part, a nurse is with you and constantly checking on your condition. Medications for pain and nausea will be given if you need them. Your family may join you there as you continue to wake up. You will be offered something to eat and drink and be given pain pills if needed. You will get your discharge instructions from the nurse and go home from there.  Preventing surgical complications  It is important to follow all instructions before and after surgery to decrease the risks of surgery and prevent complications.     Blood clots: walk, and do ankle pumps at home  Infection: wash with Hibiclens before surgery and use the mupirocin in your nose, after surgery wash your hands, don't touch your incision  Pneumonia: take deep breaths and cough  Nausea/vomiting: start with liquids and small meals and do not take pills on an empty stomach  Constipation: drink water and walk frequently    Tell your nurse if you are experiencing nausea or vomiting as they have medications to help treat these.      At home     Understanding Pain After Surgery  We will do our best to manage your pain after surgery, but it is not possible to be completely pain-free. There will be operative pain in your back or neck. Pain in the arms or legs may be one of the first things to improve. Numbness, weakness, and tingling should improve over time. However, there can be a temporary increase in symptoms in the first few days due to inflammation from surgery.   Pain medications will be prescribed to take home at  discharge. The goal of pain medicine after surgery is to make your pain tolerable, not to make you pain free. We encourage you to use the medication prescribed to you after your surgery, but please take the lowest possible dose to manage your pain. Taking high doses of narcotics can cause side effects. Transition to plain Tylenol when your pain improves. You may get more continuous pain relief by alternating between medications if you have multiple instead of taking them at the same time. Write down when you have taken a medication as it may be difficult to remember after a few doses.    Post operative medication   Tylenol (acetaminophen): take for pain. Do not take more than 3000 mg - 4000 mg in 24 hours because it can damage your liver.   Narcotics: take for moderate to severe pain. Do not drink alcohol or drive while taking narcotics. Some narcotic medications (Norco, Tylenol #3, Percocet, Vicodin) contain Tylenol (acetaminophen). Make sure to not exceed the maximum dose if you are taking additional Tylenol with these medications.  Muscle relaxers: take for muscle cramping. These can cause drowsiness.    NSAIDS (Advil, Aleve, Motrin, ibuprofen, naproxen, meloxicam, diclofenac, celecoxib, etc.) or aspirin: Do not take these unless your physician says it is ok.  Stool softeners: take to prevent constipation while you are taking narcotic medications. You can get these over the counter at the pharmacy. You may use laxatives, which are stronger, if needed.    If you believe you will need more of medication your surgeon has prescribed to you, request a refill through your pharmacy or through the refill request in [employer] (log in, go to medications, then select refill request) at least two business days before you run out of medication.     Nonpharmacologic pain management   You may use ice on your incision for 20 minutes every hour to help with pain and swelling. Do not place ice directly on your skin. You can use heat  to sooth your muscles but avoid placing heat directly on your incision. Make frequent position changes. You can do gentle stretching while avoiding significant bending or twisting. Use deep breathing techniques and distractions such as TV, music, reading, or games.   Additional Recommendations for Anterior Cervical Surgery  Smoothies or protein shakes may be more comfortable to consume then solid food for the first week after surgery. To help decrease throat swelling, suck on ice chips and drink cold liquids. Cough drops can also be help decrease throat irritation.    Movement restrictions  After surgery, no bending or twisting your neck or back. Do not lift anything over 10lbs (about the weight of a gallon of milk) or lift anything over your shoulders. Avoid pulling or pushing. You may use stairs while holding the handrail.  It is ok to ride in the car but refrain from driving or traveling long distances until cleared to do so by your surgeon. You may not drive while taking narcotics or muscle relaxants. If you have cervical surgery, it may be several weeks before you can drive.      Post Op Exercise   Walk frequently. Start with walking short distances and increase as you start to feel better. Do ankle pumps (bending at your ankles, bring your feet towards your head then point them towards the ground) 15-20 times every hour when awake to help prevent blood clots.     Your surgeon will let you know at your post operative appointment if you are ready to decrease your movement restrictions and increase your exercise. If you have questions in between appointments about lessening your restrictions, please contact the office.     You and your doctor will discuss how you are feeling as you heal and decide if outpatient physical therapy or a medical fitness referral is needed.    Caring for your incision  Always wash your hands before touching your incision. Your incision will be closed with sutures under the skin and  skin glue or Steri-Strips (thin white bandages) on top of the skin. Do not attempt to peal off Skin glue/Steri-Strips as they will come off on their own. If the incision is closed with sutures or staples on top of the skin, they will be removed at a post op appointment. The incision may be covered with a gauze dressing that can come off after three days. Once the original gauze dressing is removed, we prefer that you leave your incision open to air (without a gauze dressing). If the incision has drainage or is rubbing against your clothes or brace, you may place gauze and medical tape over it. Change the gauze and medical tape daily. Look at your incision daily to check for signs of infection.  You can shower three days after your surgery or sooner if your surgeon allows. We recommend the care partner be present during the first shower for safety. Let soapy water run over the incision, but do not scrub it or spray it directly. Gently pat it dry after with a clean towel. Do not apply any creams or lotions to the incision. Do not soak in a tub, pool, or any body of water until your incision is fully healed.    Signs of Infection   Check your incision daily for swelling, redness, drainage, pus, bad smell, or opening of the incision.     When to Call for Assistance  Call the Neurosurgery Office ([phone] Option #2) if you experience signs of infection, opening of the incision, continuous nausea or vomiting, poor pain control despite using the pain medication as directed, a sudden increase in pain, temperature over 101F, difficulty swallowing, leg swelling, or with any concerns, unanswered questions, or new problems, such as new numbness/weakness/tingling.  Call 911 or go to the nearest emergency room if you experience chest pain, difficulty breathing, loss of bowel or bladder control, extreme drowsiness, or any other life-threatening situation.     Follow-up Plan   Appointments With Dr. Shankar or Abraham at 2 and 6 weeks      Answered questions regarding : None     You can contact the RN Spine Navigator at [phone] or [email or web address] with additional questions or feedback on your care. It may take several business days to receive a reply so please do not call the RN spine navigator for refills or for emergencies.    Spine navigator spent 26 minutes conducting a virtual visit to provide education. Thank you for letting the RN Spine Navigator participate in your care.

## 2024-05-03 NOTE — TELEPHONE ENCOUNTER
Scheduled for: Colonoscopy [id number]   Provider Name: Dr Magdaleno  Date: Tues 8/20/24   Location: [facility]    Sedation: MAC   Time: 8 am, (pt is aware that [facility] will call the day before to confirm arrival time)  Prep: split suprep sent 2/13/24 by Dr Magdaleno  Meds/Allergies Reconciled?: NKDA  Diagnosis with codes: colon screening Z12.11   Was patient informed to call insurance with codes (Y/N): Yes  Referral sent?: Yes, [employer] P   [facility] or [facility] notified?: Electronic case request was sent to [facility] via [facility]/eosc.     Medication Orders: Patient is aware to NOT take iron pills, herbal meds and diet supplements for 7 days before exam. Also to NOT take any form of alcohol, recreational drugs and any forms of ED meds 24-72 hours before exam.      Misc Orders:       Further instructions given by staff:       What Type Of Note Output Would You Prefer (Optional)?: Standard Output Hpi Title: Evaluation of Skin Lesions How Severe Are Your Spot(S)?: mild Have Your Spot(S) Been Treated In The Past?: has not been treated

## 2024-05-21 ENCOUNTER — OFFICE VISIT (OUTPATIENT)
Dept: SURGERY | Facility: CLINIC | Age: 44
End: 2024-05-21

## 2024-05-21 VITALS
HEART RATE: 115 BPM | SYSTOLIC BLOOD PRESSURE: 161 MMHG | BODY MASS INDEX: 31.22 KG/M2 | WEIGHT: 223 LBS | HEIGHT: 71 IN | DIASTOLIC BLOOD PRESSURE: 94 MMHG

## 2024-05-21 DIAGNOSIS — Z98.890 S/P CERVICAL DISC REPLACEMENT: Primary | ICD-10-CM

## 2024-05-21 PROCEDURE — 3077F SYST BP >= 140 MM HG: CPT

## 2024-05-21 PROCEDURE — 3080F DIAST BP >= 90 MM HG: CPT

## 2024-05-21 PROCEDURE — 3008F BODY MASS INDEX DOCD: CPT

## 2024-05-21 PROCEDURE — 99024 POSTOP FOLLOW-UP VISIT: CPT

## 2024-05-21 NOTE — PATIENT INSTRUCTIONS
Refill policies:    Allow 2-3 business days for refills; controlled substances may take longer.  Contact your pharmacy at least 5 days prior to running out of medication and have them send an electronic request or submit request through the “request refill” option in your [employer] account.  Refills are not addressed on weekends; covering physicians do not authorize routine medications on weekends.  No narcotics or controlled substances are refilled after noon on Fridays or by on call physicians.  By law, narcotics must be electronically prescribed.  A 30 day supply with no refills is the maximum allowed.  If your prescription is due for a refill, you may be due for a follow up appointment.  To best provide you care, patients receiving routine medications need to be seen at least once a year.  Patients receiving narcotic/controlled substance medications need to be seen at least once every 3 months.  In the event that your preferred pharmacy does not have the requested medication in stock (e.g. Backordered), it is your responsibility to find another pharmacy that has the requested medication available.  We will gladly send a new prescription to that pharmacy at your request.    Scheduling Tests:    If your physician has ordered radiology tests such as MRI or CT scans, please contact Central Scheduling at [phone] right away to schedule the test.  Once scheduled, the [facility] Centralized Referral Team will work with your insurance carrier to obtain pre-certification or prior authorization.  Depending on your insurance carrier, approval may take 3-10 days.  It is highly recommended patients assure they have received an authorization before having a test performed.  If test is done without insurance authorization, patient may be responsible for the entire amount billed.      Precertification and Prior Authorizations:  If your physician has recommended that you have a procedure or additional testing performed the [facility]  Centralized Referral Team will contact your insurance carrier to obtain pre-certification or prior authorization.    You are strongly encouraged to contact your insurance carrier to verify that your procedure/test has been approved and is a COVERED benefit.  Although the [facility] Centralized Referral Team does its due diligence, the insurance carrier gives the disclaimer that \"Although the procedure is authorized, this does not guarantee payment.\"    Ultimately the patient is responsible for payment.   Thank you for your understanding in this matter.  Paperwork Completion:  If you require FMLA or disability paperwork for your recovery, please make sure to either drop it off or have it faxed to our office at [phone]. Be sure the form has your name and date of birth on it.  The form will be faxed to our Forms Department and they will complete it for you.  There is a 25$ fee for all forms that need to be filled out.  Please be aware there is a 10-14 day turnaround time.  You will need to sign a release of information (LATRICIA) form if your paperwork does not come with one.  You may call the Forms Department with any questions at [phone].  Their fax number is [phone].

## 2024-05-21 NOTE — PROGRESS NOTES
PAM STONE M.D., F.A.A.N.S.     of Neurosurgery  [facility]  Board Certified Neurosurgeon                          [facility], [facility], [facility] Neurosurgery        [address]  [address]  [place] [address]    PHONE  [phone]          FAX  [phone]    [email or web address]      OFFICE FOLLOW-UP NOTE      Hussein Davila    : 1980    MRN: [id number]  CSN: [id number]      PCP: Colleen Weiler, DO  Referring Provider: No ref. provider found    Insurance: Payor: [facility] HMO / Plan: [facility] BLUE ADVANTAGE HMO / Product Type: HMO /     Date of Visit: 2024    Reason for Visit:   Chief Complaint    Post-Op                         History of Present Care:  Hussein Davila is a a(n) 43 year old, male.  Our patient is doing very well with resolution of his radiculopathy.  He has some persistent numbness to the left thumb and some intermittent awkwardness of the right upper extremity.  There are no incisional issues.      History:  .  Past Medical History:    ADHD    Allergic rhinitis    Asthma (HCC)    Cats    Dyslipidemia    Eczema    Essential hypertension    High blood pressure    Hypertension    Visual impairment    glasses/contacts      Past Surgical History:   Procedure Laterality Date    Other  Childhood    Bilateral myringotomy tubes    Vasectomy        Family History   Problem Relation Age of Onset    Asthma Mother     Other (Other) Mother         asthma    Diabetes Father     Hypertension Father     Other (CVA) Father         Age 70s    Diabetes Brother     Colon Cancer Paternal Grandmother     Heart Disease Maternal Grandfather          age 60s      Social History     Socioeconomic History    Marital status:      Spouse name: Not on file    Number of children: Not on file    Years of education: Not on  file    Highest education level: Not on file   Occupational History    Occupation: Manager   Tobacco Use    Smoking status: Former     Current packs/day: 0.00     Average packs/day: 1 pack/day for 6.0 years (6.0 ttl pk-yrs)     Types: Cigarettes     Start date: 3/20/1999     Quit date: 3/20/2005     Years since quittin.1     Passive exposure: Never    Smokeless tobacco: Current   Vaping Use    Vaping status: Some Days   Substance and Sexual Activity    Alcohol use: Not Currently     Comment: Occasional beer    Drug use: Yes     Types: Cannabis     Comment: vape    Sexual activity: Not on file   Other Topics Concern     Service Not Asked    Blood Transfusions Not Asked    Caffeine Concern Yes     Comment: coffee    Occupational Exposure Not Asked    Hobby Hazards Not Asked    Sleep Concern Not Asked    Stress Concern Not Asked    Weight Concern Not Asked    Special Diet Not Asked    Back Care Not Asked    Exercise Not Asked    Bike Helmet Not Asked    Seat Belt Not Asked    Self-Exams Not Asked   Social History Narrative    Not on file     Social Determinants of Health     Financial Resource Strain: Not on file   Food Insecurity: Not on file   Transportation Needs: Not on file   Physical Activity: Not on file   Stress: Not on file   Social Connections: Not on file   Housing Stability: Not on file        Allergies:  Allergies   Allergen Reactions    Crab (Diagnostic) HIVES         Medications:  Current Outpatient Medications   Medication Sig Dispense Refill    Mometasone Furoate (ELOCON) 0.1 % External Cream Apply 1 Application  topically 2 (two) times daily as needed. 50 g 1    Atomoxetine HCl 80 MG Oral Cap Take by mouth daily.      montelukast 10 MG Oral Tab Take 1 tablet (10 mg total) by mouth nightly. 90 tablet 3    amLODIPine 10 MG Oral Tab Take 1 tablet (10 mg total) by mouth once daily. (Patient taking differently: Take 1 tablet (10 mg total) by mouth every morning.) 90 tablet 3    Cholecalciferol  (VITAMIN D3) 25 MCG (1000 UT) Oral Cap Take 1 tablet by mouth daily.      cetirizine 10 MG Oral Tab Take 1 tablet (10 mg total) by mouth daily. (Patient taking differently: Take 1 tablet (10 mg total) by mouth every morning.) 90 tablet 3    busPIRone 15 MG Oral Tab Take 1 tablet (15 mg total) by mouth 2 (two) times daily.      lisinopril-hydroCHLOROthiazide 10-12.5 MG Oral Tab Take 1 tablet by mouth daily. 90 tablet 3    Multiple Vitamins-Minerals (MENS MULTI VITAMIN & MINERAL) Oral Tab Take by mouth.      diazePAM 5 MG Oral Tab Take 1 tablet (5 mg total) by mouth 3 (three) times daily as needed (pain/spasm). 30 tablet 0    mupirocin 2 % External Ointment Apply a pea-sized amount to a Q-tip and swab into each nostril twice daily for five days. 1 each 0        Review of Systems:  A 10-point system was reviewed.  Pertinent positives and negatives are noted in HPI.      Physical Exam:  BP (!) 161/94   Pulse 115   Ht 71\"   Wt 223 lb (101.2 kg)   BMI 31.10 kg/m²         Neurological Exam:    AAOx3, following commands  PERRLA  EOMI  Face symmetrical  Tongue midline  Hearing symmetrical and intact to finger rub    No rhinorrhea or otorrhea    Romberg negative    Motor Strength:  5 out of 5 throughout    Sensation to light touch:  Symmetrical and intact with some asymmetry in the left thumb relative to the right    Incision:  Clean dry and intact    Abdomen:  Soft, non-distended, non-tender, with no rebound or guarding.  No peritoneal signs.     Extremities:  Non-tender, no lower extremity edema noted.      Labs:  CBC:  Lab Results   Component Value Date    WBC 10.2 04/29/2024    HGB 14.5 04/29/2024    HCT 43.0 04/29/2024    MCV 86.3 04/29/2024    .0 04/29/2024      BMG:  Lab Results   Component Value Date     04/29/2024    K 4.1 04/29/2024    CO2 29.0 04/29/2024     04/29/2024    BUN 10 04/29/2024      INR:  Lab Results   Component Value Date    INR 0.91 04/29/2024          Diagnostics:  No new  imaging to review    Diagnosis:  1. S/P cervical disc replacement  - PHYSICAL THERAPY - INTERNAL      Assessment/Plan:  Our patient is doing well and will start physical therapy at this point in time.  Follow-up in 4 weeks.    More than 30 minutes were spent during this visit and the coordination of this patient's care. All questions and concerns were addressed. We appreciate the opportunity to participate in the care of this patient. Please do not hesitate to call our office ([phone]) with any issues.        Skyler Shankar M.D., F.A.A.N.S.    5/21/2024  11:33 AM    This note has been dictated utilizing voice recognition software. Unfortunately, this may lead to occasional typographical errors. If there are any questions regarding this, please do not hesitate to contact our office.

## 2024-05-21 NOTE — PROGRESS NOTES
Last procedure: 5/6/24  Last office visit: 4/23/24  Pain Level: 0/10.   Numbness / Tingling: Patient reports numbness and tingling on thumb on left hand.  Physical Therapy / Injections: Patient has been compliant and completed Physical Therapy.

## 2024-05-25 NOTE — TELEPHONE ENCOUNTER
Please review. Protocol Failed; No Protocol    Requested Prescriptions   Pending Prescriptions Disp Refills    LISINOPRIL-HYDROCHLOROTHIAZIDE 10-12.5 MG Oral Tab [Pharmacy Med Name: Lisinopril/Hydrochlorothiazide 10-12.5 Mg Tab [facility]] 90 tablet 0     Sig: TAKE ONE TABLET BY MOUTH ONE TIME DAILY       Hypertension Medications Protocol Failed - 5/22/2024  1:30 AM        Failed - Last BP reading less than 140/90     BP Readings from Last 1 Encounters:   05/21/24 (!) 161/94               Passed - CMP or BMP in past 12 months        Passed - In person appointment or virtual visit in the past 12 mos or appointment in next 3 mos     Recent Outpatient Visits              4 days ago S/P cervical disc replacement    [facility] Tano Curran PA-C    Office Visit    3 weeks ago Encounter for education    [facility], [facility]    Nurse Only    3 weeks ago Pre-op evaluation    [facility] Weiler, Colleen M, DO    Office Visit    1 month ago Cervical radiculopathy at [address], Skyler Miller MD    Office Visit    1 month ago Cervical radiculopathy at [address], Skyler Miller MD    Office Visit          Future Appointments         Provider Department Appt Notes    In 3 weeks Tano Curran PA-C [facility] 6 wk post op, sx: 5/6/24    In 2 months KULDEEP [facility] Colonoscopy @ [facility]                    Passed - EGFRCR or GFRNAA > 50     GFR Evaluation  EGFRCR: 108 , resulted on 4/29/2024                 Future Appointments         Provider Department Appt Notes    In 3 weeks Tano Curran PA-C [facility] 6 wk post op, sx: 5/6/24    In 2 months KULDEEP [facility], [facility], [place] Colonoscopy @ [facility]          Recent Outpatient Visits              4 days ago S/P cervical disc replacement    [facility], Tano Ivey PA-C    Office Visit    3 weeks ago Encounter for education    [facility]    Nurse Only    3 weeks ago Pre-op evaluation    [facility] Weiler, Colleen M, DO    Office Visit    1 month ago Cervical radiculopathy at C6    [facility], Skyler Miller MD    Office Visit    1 month ago Cervical radiculopathy at [address], Skyler Miller MD    Office Visit

## 2024-05-27 RX ORDER — LISINOPRIL AND HYDROCHLOROTHIAZIDE 12.5; 1 MG/1; MG/1
1 TABLET ORAL DAILY
Qty: 90 TABLET | Refills: 3 | Status: SHIPPED | OUTPATIENT
Start: 2024-05-27

## 2024-06-17 NOTE — PATIENT INSTRUCTIONS
Refill policies:    Allow 2-3 business days for refills; controlled substances may take longer.  Contact your pharmacy at least 5 days prior to running out of medication and have them send an electronic request or submit request through the “request refill” option in your [employer] account.  Refills are not addressed on weekends; covering physicians do not authorize routine medications on weekends.  No narcotics or controlled substances are refilled after noon on Fridays or by on call physicians.  By law, narcotics must be electronically prescribed.  A 30 day supply with no refills is the maximum allowed.  If your prescription is due for a refill, you may be due for a follow up appointment.  To best provide you care, patients receiving routine medications need to be seen at least once a year.  Patients receiving narcotic/controlled substance medications need to be seen at least once every 3 months.  In the event that your preferred pharmacy does not have the requested medication in stock (e.g. Backordered), it is your responsibility to find another pharmacy that has the requested medication available.  We will gladly send a new prescription to that pharmacy at your request.    Scheduling Tests:    If your physician has ordered radiology tests such as MRI or CT scans, please contact Central Scheduling at [phone] right away to schedule the test.  Once scheduled, the [facility] Centralized Referral Team will work with your insurance carrier to obtain pre-certification or prior authorization.  Depending on your insurance carrier, approval may take 3-10 days.  It is highly recommended patients assure they have received an authorization before having a test performed.  If test is done without insurance authorization, patient may be responsible for the entire amount billed.      Precertification and Prior Authorizations:  If your physician has recommended that you have a procedure or additional testing performed the [facility]  Centralized Referral Team will contact your insurance carrier to obtain pre-certification or prior authorization.    You are strongly encouraged to contact your insurance carrier to verify that your procedure/test has been approved and is a COVERED benefit.  Although the [facility] Centralized Referral Team does its due diligence, the insurance carrier gives the disclaimer that \"Although the procedure is authorized, this does not guarantee payment.\"    Ultimately the patient is responsible for payment.   Thank you for your understanding in this matter.  Paperwork Completion:  If you require FMLA or disability paperwork for your recovery, please make sure to either drop it off or have it faxed to our office at [phone]. Be sure the form has your name and date of birth on it.  The form will be faxed to our Forms Department and they will complete it for you.  There is a 25$ fee for all forms that need to be filled out.  Please be aware there is a 10-14 day turnaround time.  You will need to sign a release of information (LATRICIA) form if your paperwork does not come with one.  You may call the Forms Department with any questions at [phone].  Their fax number is [phone].

## 2024-06-18 ENCOUNTER — OFFICE VISIT (OUTPATIENT)
Dept: SURGERY | Facility: CLINIC | Age: 44
End: 2024-06-18

## 2024-06-18 VITALS
BODY MASS INDEX: 30.8 KG/M2 | WEIGHT: 220 LBS | HEIGHT: 71 IN | HEART RATE: 112 BPM | DIASTOLIC BLOOD PRESSURE: 92 MMHG | SYSTOLIC BLOOD PRESSURE: 151 MMHG

## 2024-06-18 DIAGNOSIS — M54.12 CERVICAL RADICULOPATHY AT C6: ICD-10-CM

## 2024-06-18 DIAGNOSIS — Z98.890 S/P CERVICAL DISC REPLACEMENT: Primary | ICD-10-CM

## 2024-06-18 PROCEDURE — 99024 POSTOP FOLLOW-UP VISIT: CPT

## 2024-06-18 PROCEDURE — 3080F DIAST BP >= 90 MM HG: CPT

## 2024-06-18 PROCEDURE — 3077F SYST BP >= 140 MM HG: CPT

## 2024-06-18 PROCEDURE — 3008F BODY MASS INDEX DOCD: CPT

## 2024-06-18 RX ORDER — BUSPIRONE HYDROCHLORIDE 30 MG/1
30 TABLET ORAL 2 TIMES DAILY
COMMUNITY
Start: 2024-05-23

## 2024-06-18 NOTE — PROGRESS NOTES
[facility] Neurosurgery        Center for [facility]      [address]  [address]  [place] [address]    PHONE  [phone]          FAX  [phone]    [email or web address]      OFFICE FOLLOW-UP NOTE            Hussein Davila    : 1980    MRN: [id number]  CSN: [id number]      PCP: Colleen Weiler, DO  Referring Provider: Carolynn Knight    Insurance: Payor: [facility] HMO / Plan: [facility] BLUE ADVANTAGE HMO / Product Type: HMO /           Date of Visit: 2024    Reason for Visit:   Chief Complaint    Post-Op                         History of Present Care:  Hussein Davila is a a(n) 43 year old, male presents to my office 6 weeks status post C5-6 artificial disc replacement.  Patient reports improvement in neck discomfort as well as upper extremity tingling.  Postoperatively patient having right biceps tingling which was new.  He does continue to have some mild numbness in the lateral aspect of the left thumb and weakness which is similar to before surgery.  Overall he is feeling quite better.  He is utilizing anti-inflammatories for his discomfort.  He has completed 2 to 3 weeks of physical therapy and would like to continue this.  He denies incisional site issues or bowel or bladder changes.    History:  .  Past Medical History:    ADHD    Allergic rhinitis    Asthma (HCC)    Cats    Dyslipidemia    Eczema    Essential hypertension    High blood pressure    Hypertension    Visual impairment    glasses/contacts      Past Surgical History:   Procedure Laterality Date    Other  Childhood    Bilateral myringotomy tubes    Vasectomy        Family History   Problem Relation Age of Onset    Asthma Mother     Other (Other) Mother         asthma    Diabetes Father     Hypertension Father     Other (CVA) Father         Age 70s    Diabetes Brother     Colon Cancer Paternal Grandmother     Heart Disease Maternal  Grandfather          age 60s      Social History     Socioeconomic History    Marital status:      Spouse name: Not on file    Number of children: Not on file    Years of education: Not on file    Highest education level: Not on file   Occupational History    Occupation: Manager   Tobacco Use    Smoking status: Former     Current packs/day: 0.00     Average packs/day: 1 pack/day for 6.0 years (6.0 ttl pk-yrs)     Types: Cigarettes     Start date: 3/20/1999     Quit date: 3/20/2005     Years since quittin.2     Passive exposure: Never    Smokeless tobacco: Current   Vaping Use    Vaping status: Some Days   Substance and Sexual Activity    Alcohol use: Not Currently     Comment: Occasional beer    Drug use: Yes     Types: Cannabis     Comment: vape    Sexual activity: Not on file   Other Topics Concern     Service Not Asked    Blood Transfusions Not Asked    Caffeine Concern Yes     Comment: coffee    Occupational Exposure Not Asked    Hobby Hazards Not Asked    Sleep Concern Not Asked    Stress Concern Not Asked    Weight Concern Not Asked    Special Diet Not Asked    Back Care Not Asked    Exercise Not Asked    Bike Helmet Not Asked    Seat Belt Not Asked    Self-Exams Not Asked   Social History Narrative    Not on file     Social Determinants of Health     Financial Resource Strain: Not on file   Food Insecurity: Not on file   Transportation Needs: Not on file   Physical Activity: Not on file   Stress: Not on file   Social Connections: Not on file   Housing Stability: Not on file        Allergies:  Allergies   Allergen Reactions    Crab (Diagnostic) HIVES         Medications:  Current Outpatient Medications   Medication Sig Dispense Refill    busPIRone HCl 30 MG Oral Tab Take 1 tablet (30 mg total) by mouth 2 (two) times daily.      lisinopril-hydroCHLOROthiazide 10-12.5 MG Oral Tab Take 1 tablet by mouth daily. 90 tablet 3    mupirocin 2 % External Ointment Apply a pea-sized amount to a  Q-tip and swab into each nostril twice daily for five days. 1 each 0    Mometasone Furoate (ELOCON) 0.1 % External Cream Apply 1 Application  topically 2 (two) times daily as needed. 50 g 1    Atomoxetine HCl 80 MG Oral Cap Take by mouth daily.      montelukast 10 MG Oral Tab Take 1 tablet (10 mg total) by mouth nightly. 90 tablet 3    amLODIPine 10 MG Oral Tab Take 1 tablet (10 mg total) by mouth once daily. (Patient taking differently: Take 1 tablet (10 mg total) by mouth every morning.) 90 tablet 3    Cholecalciferol (VITAMIN D3) 25 MCG (1000 UT) Oral Cap Take 1 tablet by mouth daily.      cetirizine 10 MG Oral Tab Take 1 tablet (10 mg total) by mouth daily. (Patient taking differently: Take 1 tablet (10 mg total) by mouth every morning.) 90 tablet 3    Multiple Vitamins-Minerals (MENS MULTI VITAMIN & MINERAL) Oral Tab Take by mouth.          Review of Systems:  A 10-point system was reviewed.  Pertinent positives and negatives are noted in HPI.      Physical Exam:  BP (!) 151/92 (BP Location: Left arm, Patient Position: Sitting, Cuff Size: adult)   Pulse 112   Ht 71\"   Wt 220 lb (99.8 kg)   BMI 30.68 kg/m²         Neurological Exam:    AAOx3, following commands  PERRLA  EOMI  Face symmetrical  Tongue midline  Hearing symmetrical and intact to finger rub    No rhinorrhea or otorrhea    Romberg negative    Motor Strength:  Left hand  trace weakness  Otherwise 5 out of 5 and symmetric in bilateral upper extremities    Sensation to light touch:  Decree sensation to light touch in the lateral aspect of the left thumb    Incision:  Clean, dry, intact      Abdomen:  Soft, non-distended, non-tender, with no rebound or guarding.  No peritoneal signs.     Extremities:  Non-tender, no lower extremity edema noted.      Labs:  CBC:  Lab Results   Component Value Date    WBC 10.2 04/29/2024    HGB 14.5 04/29/2024    HCT 43.0 04/29/2024    MCV 86.3 04/29/2024    .0 04/29/2024      BMG:  Lab Results   Component  Value Date     04/29/2024    K 4.1 04/29/2024    CO2 29.0 04/29/2024     04/29/2024    BUN 10 04/29/2024      INR:  Lab Results   Component Value Date    INR 0.91 04/29/2024          Diagnostics:  No new imaging to review    Diagnosis:  1. S/P cervical disc replacement  - XR CERVICAL SPINE COMPLETE W/FLEX + EXT (CPT=72052); Future    2. Cervical radiculopathy at C6  - XR CERVICAL SPINE COMPLETE W/FLEX + EXT (CPT=72052); Future      Assessment/Plan:  Hussein Davila is now 6 weeks status post C5-6 disc replacement.  Patient doing quite well at this time making a typical postoperative recovery.  He will continue to work with physical therapy in regard to range of motion of the cervical spine and strengthening.  I will provide him with a order for flexion-extension x-rays of the cervical spine to be completed 6 weeks from today and he will return to my office for follow-up at that time.    More than 30 minutes were spent during this visit and the coordination of this patient's care. All questions and concerns were addressed. We appreciate the opportunity to participate in the care of this patient. Please do not hesitate to call our office ([phone]) with any issues.    This note has been dictated utilizing voice recognition software. Unfortunately, this may lead to occasional typographical errors. If there are any questions regarding this, please do not hesitate to contact our office.           Tano Curran PA-C    6/18/2024  2:59 PM

## 2024-06-19 ENCOUNTER — PATIENT MESSAGE (OUTPATIENT)
Dept: FAMILY MEDICINE CLINIC | Facility: CLINIC | Age: 44
End: 2024-06-19

## 2024-06-19 DIAGNOSIS — M54.12 CERVICAL RADICULOPATHY: Primary | ICD-10-CM

## 2024-06-24 ENCOUNTER — TELEPHONE (OUTPATIENT)
Dept: SURGERY | Facility: CLINIC | Age: 44
End: 2024-06-24

## 2024-06-24 NOTE — TELEPHONE ENCOUNTER
Received a fax from [facility] Physical Therapy of patients most recent session dated on 6/3/24 with Azucena OJSEPH PT. Fax was reviewed and signed by SHAHID Rosario and faxed back over to [facility] at [phone]. Received Confirmation: Success.

## 2024-07-25 ENCOUNTER — NURSE TRIAGE (OUTPATIENT)
Dept: FAMILY MEDICINE CLINIC | Facility: CLINIC | Age: 44
End: 2024-07-25

## 2024-07-25 NOTE — TELEPHONE ENCOUNTER
Action Requested: Summary for Provider     []  Critical Lab, Recommendations Needed  [] Need Additional Advice  []   FYI    []   Need Orders  [] Need Medications Sent to Pharmacy  []  Other     SUMMARY: Per protocol advised : Office visit within 2 weeks   Future Appointments   Date Time Provider Department Center   8/6/2024  2:00 PM Tano Curran PA-C [facility] NEURSUR[facility]   8/8/2024 10:15 AM Weiler, Colleen M, DO [facility]   8/20/2024  8:00 AM KULDEEP, PROCEDURE [id number] None       Reason for call: Urinary Symptoms  Onset: Data Unavailable    Patient calling ( name and date of birth of patient verified )  in regards to MyChart     Patient reports bladder pressure is intermittent  ( once or twice a week )   Feels he has no control  at the end of the stream , dribbles and slows down onset of a few months but increasing the past few weeks    Care Advice    Patient/Caregiver understands and will follow care advice?: Yes, plans to follow advice           Urinary Symptoms-A-OH  Viviane KITCHEN Thu Jul 25, 2024 11:00 AM      Disposition and First Aid       SEE IN OFFICE WITHIN 2 WEEKS:   * You need to be examined.   * Let me give you an appointment.              Urinary Incontinence       CAFFEINE, ALCOHOL, AND FOODS:  * Avoid caffeine and alcohol, especially during the 4 hours before bedtime.  * Certain foods may irritate the bladder in some people. Examples are highly spiced foods, acidic foods (tomato, grapefruit), and carbonated beverages.    POST-VOID DRIBBLING IN MALES:  * DOES NOT WORK: Shaking the penis at the end of urination  * DOES WORK: Gently press on the area just below the scrotum, then blot the tip of the penis with a tissue. Reason: Pushing on this area removes the last few drops.    CALL BACK IF:  * Fever occurs  * Pain or burning with urination  * You become worse                           Patient verbalizes understanding and agrees with plan.       Dr. Weiler,   I hope you are well.    I  have been having issues urinating for a few months. The primary symptoms are a feeling of pressure sometimes but it seems like something is pinched. Also, at the end of urination it dribbles out and I have no control over it.      It’s getting difficult to deal with. I’m not sure if a urologist is needed so I wanted to check with you.      Thanks,     Alphonse    Reason for Disposition   Dribbling (losing urine) just after finishing urination (i.e., post-void dribbling)    Protocols used: Urinary Symptoms-A-OH

## 2024-08-06 ENCOUNTER — HOSPITAL ENCOUNTER (OUTPATIENT)
Dept: GENERAL RADIOLOGY | Facility: HOSPITAL | Age: 44
Discharge: HOME | End: 2024-08-06
Payer: COMMERCIAL

## 2024-08-06 ENCOUNTER — PATIENT MESSAGE (OUTPATIENT)
Dept: SURGERY | Facility: CLINIC | Age: 44
End: 2024-08-06

## 2024-08-06 ENCOUNTER — OFFICE VISIT (OUTPATIENT)
Dept: SURGERY | Facility: CLINIC | Age: 44
End: 2024-08-06
Payer: COMMERCIAL

## 2024-08-06 VITALS
HEART RATE: 116 BPM | DIASTOLIC BLOOD PRESSURE: 103 MMHG | SYSTOLIC BLOOD PRESSURE: 145 MMHG | WEIGHT: 220 LBS | HEIGHT: 71 IN | BODY MASS INDEX: 30.8 KG/M2

## 2024-08-06 DIAGNOSIS — Z98.890 S/P CERVICAL DISC REPLACEMENT: Primary | ICD-10-CM

## 2024-08-06 DIAGNOSIS — M54.12 CERVICAL RADICULOPATHY AT C6: ICD-10-CM

## 2024-08-06 DIAGNOSIS — Z98.890 S/P CERVICAL DISC REPLACEMENT: ICD-10-CM

## 2024-08-06 PROCEDURE — 3080F DIAST BP >= 90 MM HG: CPT

## 2024-08-06 PROCEDURE — 99214 OFFICE O/P EST MOD 30 MIN: CPT

## 2024-08-06 PROCEDURE — 3008F BODY MASS INDEX DOCD: CPT

## 2024-08-06 PROCEDURE — 72052 X-RAY EXAM NECK SPINE 6/>VWS: CPT

## 2024-08-06 PROCEDURE — 3077F SYST BP >= 140 MM HG: CPT

## 2024-08-06 NOTE — PROGRESS NOTES
PAM STONE M.D., F.A.A.N.S.     of Neurosurgery  [facility]  Board Certified Neurosurgeon                          [facility], [facility], [facility] Neurosurgery        [address]  [address] [facility] [facility]  [place] [address]    PHONE  [phone]          FAX  [phone]    [email or web address]      OFFICE FOLLOW-UP NOTE      Hussein Davila    : 1980    MRN: [id number]  CSN: [id number]      PCP: Colleen Weiler, DO  Referring Provider: Carolynn Knight    Insurance: Payor: [facility] HMO / Plan: [facility] BLUE ADVANTAGE HMO / Product Type: HMO /     Date of Visit: 2024    Reason for Visit:   Chief Complaint    Follow - Up                         History of Present Care:  Hussein Davila is a a(n) 43 year old, male.  Patient is doing very well with only some residual discomfort in the right triceps and just very localized numbness in the right thumb.  Otherwise he is doing very well.  He is finishing physical therapy at this time.  He is here with his 3-month postop x-ray.      History:  .  Past Medical History:    ADHD    Allergic rhinitis    Asthma (HCC)    Cats    Dyslipidemia    Eczema    Essential hypertension    High blood pressure    Hypertension    Visual impairment    glasses/contacts      Past Surgical History:   Procedure Laterality Date    Other  Childhood    Bilateral myringotomy tubes    Vasectomy        Family History   Problem Relation Age of Onset    Asthma Mother     Other (Other) Mother         asthma    Diabetes Father     Hypertension Father     Other (CVA) Father         Age 70s    Diabetes Brother     Colon Cancer Paternal Grandmother     Heart Disease Maternal Grandfather          age 60s      Social History     Socioeconomic History    Marital status:      Spouse name: Not on file    Number of children: Not  on file    Years of education: Not on file    Highest education level: Not on file   Occupational History    Occupation: Manager   Tobacco Use    Smoking status: Former     Current packs/day: 0.00     Average packs/day: 1 pack/day for 6.0 years (6.0 ttl pk-yrs)     Types: Cigarettes     Start date: 3/20/1999     Quit date: 3/20/2005     Years since quittin.3     Passive exposure: Never    Smokeless tobacco: Current   Vaping Use    Vaping status: Some Days   Substance and Sexual Activity    Alcohol use: Not Currently     Comment: Occasional beer    Drug use: Yes     Types: Cannabis     Comment: vape    Sexual activity: Not on file   Other Topics Concern     Service Not Asked    Blood Transfusions Not Asked    Caffeine Concern Yes     Comment: coffee    Occupational Exposure Not Asked    Hobby Hazards Not Asked    Sleep Concern Not Asked    Stress Concern Not Asked    Weight Concern Not Asked    Special Diet Not Asked    Back Care Not Asked    Exercise Not Asked    Bike Helmet Not Asked    Seat Belt Not Asked    Self-Exams Not Asked   Social History Narrative    Not on file     Social Determinants of Health     Financial Resource Strain: Not on file   Food Insecurity: Not on file   Transportation Needs: Not on file   Physical Activity: Not on file   Stress: Not on file   Social Connections: Not on file   Housing Stability: Not on file        Allergies:  Allergies   Allergen Reactions    Crab (Diagnostic) HIVES         Medications:  Current Outpatient Medications   Medication Sig Dispense Refill    busPIRone HCl 30 MG Oral Tab Take 1 tablet (30 mg total) by mouth 2 (two) times daily.      lisinopril-hydroCHLOROthiazide 10-12.5 MG Oral Tab Take 1 tablet by mouth daily. 90 tablet 3    mupirocin 2 % External Ointment Apply a pea-sized amount to a Q-tip and swab into each nostril twice daily for five days. 1 each 0    Mometasone Furoate (ELOCON) 0.1 % External Cream Apply 1 Application  topically 2 (two) times  daily as needed. 50 g 1    Atomoxetine HCl 80 MG Oral Cap Take by mouth daily.      montelukast 10 MG Oral Tab Take 1 tablet (10 mg total) by mouth nightly. 90 tablet 3    amLODIPine 10 MG Oral Tab Take 1 tablet (10 mg total) by mouth once daily. (Patient taking differently: Take 1 tablet (10 mg total) by mouth every morning.) 90 tablet 3    Cholecalciferol (VITAMIN D3) 25 MCG (1000 UT) Oral Cap Take 1 tablet by mouth daily.      cetirizine 10 MG Oral Tab Take 1 tablet (10 mg total) by mouth daily. (Patient taking differently: Take 1 tablet (10 mg total) by mouth every morning.) 90 tablet 3    Multiple Vitamins-Minerals (MENS MULTI VITAMIN & MINERAL) Oral Tab Take by mouth.          Review of Systems:  A 10-point system was reviewed.  Pertinent positives and negatives are noted in HPI.      Physical Exam:  BP (!) 145/103 (BP Location: Right arm, Patient Position: Sitting, Cuff Size: adult)   Pulse 116   Ht 71\"   Wt 220 lb (99.8 kg)   BMI 30.68 kg/m²         Neurological Exam:    AAOx3, following commands  PERRLA  EOMI  Face symmetrical  Tongue midline  Hearing symmetrical and intact to finger rub    No rhinorrhea or otorrhea    Romberg negative    Motor Strength:  Strength intact    Sensation to light touch:  Symmetrical    Incision:  Clean dry and intact    Abdomen:  Soft, non-distended, non-tender, with no rebound or guarding.  No peritoneal signs.     Extremities:  Non-tender, no lower extremity edema noted.      Labs:  CBC:  Lab Results   Component Value Date    WBC 10.2 04/29/2024    HGB 14.5 04/29/2024    HCT 43.0 04/29/2024    MCV 86.3 04/29/2024    .0 04/29/2024      BMG:  Lab Results   Component Value Date     04/29/2024    K 4.1 04/29/2024    CO2 29.0 04/29/2024     04/29/2024    BUN 10 04/29/2024      INR:  Lab Results   Component Value Date    INR 0.91 04/29/2024          Diagnostics:  I reviewed an x-ray of the cervical spine with flexion-extension films.  There is evidence of  cervical arthroplasty at C5-6.  There is good range of motion and the hardware is properly positioned without any complications.    Diagnosis:  1. S/P cervical disc replacement  - XR CERVICAL SPINE COMPLETE W/FLEX + EXT (CPT=72052); Future      Assessment/Plan:  Our patient is doing very well at this point in time.  He is released from all restrictions.  His x-ray appears satisfactory.  I would like for him to follow-up at the 6-month postop zohreh with another x-ray of the cervical spine.    More than 30 minutes were spent during this visit and the coordination of this patient's care. All questions and concerns were addressed. We appreciate the opportunity to participate in the care of this patient. Please do not hesitate to call our office ([phone]) with any issues.        Skyler Shankar M.D., F.A.A.N.S.    8/6/2024  2:23 PM    This note has been dictated utilizing voice recognition software. Unfortunately, this may lead to occasional typographical errors. If there are any questions regarding this, please do not hesitate to contact our office.

## 2024-08-07 NOTE — TELEPHONE ENCOUNTER
From: Hussein Davila  To: Skyler Shankar  Sent: 8/6/2024 6:32 PM CDT  Subject: X-Ray Images    I was told by radiology tech that I would be able to see the images from the x-ray in [facility]. Can those be sent to me, or I be told how to access in [facility]?     Thank you,    Alphonse   Paramedian Forehead Flap Text: A decision was made to reconstruct the defect utilizing an interpolation axial flap and a staged reconstruction.  A telfa template was made of the defect.  This telfa template was then used to outline the paramedian forehead pedicle flap.  The donor area for the pedicle flap was then injected with anesthesia.  The flap was excised through the skin and subcutaneous tissue down to the layer of the underlying musculature.  The pedicle flap was carefully excised within this deep plane to maintain its blood supply.  The edges of the donor site were undermined.   The donor site was closed in a primary fashion.  The pedicle was then rotated into position and sutured.  Once the tube was sutured into place, adequate blood supply was confirmed with blanching and refill.  The pedicle was then wrapped with xeroform gauze and dressed appropriately with a telfa and gauze bandage to ensure continued blood supply and protect the attached pedicle.

## 2024-08-07 NOTE — TELEPHONE ENCOUNTER
Received message from patient asking if he can view X-ray images via [employer].  Reply sent, informing him that he may view written radiology report, but will have to contact medical records for an imaging disc.

## 2024-08-20 ENCOUNTER — TELEPHONE (OUTPATIENT)
Facility: CLINIC | Age: 44
End: 2024-08-20

## 2024-08-20 NOTE — TELEPHONE ENCOUNTER
Recall colon in 10 years per Dr. Magdaleno. Colonoscopy done on 8/20/24.    Health maintenance updated and message sent to pt outreach to repeat colon in 10 years.

## 2024-08-27 ENCOUNTER — OFFICE VISIT (OUTPATIENT)
Dept: FAMILY MEDICINE CLINIC | Facility: CLINIC | Age: 44
End: 2024-08-27
Payer: COMMERCIAL

## 2024-08-27 ENCOUNTER — LAB ENCOUNTER (OUTPATIENT)
Dept: LAB | Age: 44
End: 2024-08-27
Attending: FAMILY MEDICINE
Payer: COMMERCIAL

## 2024-08-27 VITALS
DIASTOLIC BLOOD PRESSURE: 76 MMHG | HEART RATE: 102 BPM | TEMPERATURE: 98 F | RESPIRATION RATE: 16 BRPM | WEIGHT: 223 LBS | BODY MASS INDEX: 31 KG/M2 | SYSTOLIC BLOOD PRESSURE: 112 MMHG

## 2024-08-27 DIAGNOSIS — R39.198 ABNORMAL URINATION: ICD-10-CM

## 2024-08-27 DIAGNOSIS — R39.198 ABNORMAL URINATION: Primary | ICD-10-CM

## 2024-08-27 DIAGNOSIS — H91.93 BILATERAL HEARING LOSS, UNSPECIFIED HEARING LOSS TYPE: ICD-10-CM

## 2024-08-27 LAB
ANION GAP SERPL CALC-SCNC: 5 MMOL/L (ref 0–18)
BILIRUB UR QL: NEGATIVE
BUN BLD-MCNC: 11 MG/DL (ref 9–23)
BUN/CREAT SERPL: 10.9 (ref 10–20)
CALCIUM BLD-MCNC: 10.6 MG/DL (ref 8.7–10.4)
CHLORIDE SERPL-SCNC: 105 MMOL/L (ref 98–112)
CLARITY UR: CLEAR
CO2 SERPL-SCNC: 29 MMOL/L (ref 21–32)
COMPLEXED PSA SERPL-MCNC: 0.48 NG/ML (ref ?–4)
CREAT BLD-MCNC: 1.01 MG/DL
EGFRCR SERPLBLD CKD-EPI 2021: 93 ML/MIN/1.73M2 (ref 60–?)
FASTING STATUS PATIENT QL REPORTED: NO
GLUCOSE BLD-MCNC: 95 MG/DL (ref 70–99)
GLUCOSE UR-MCNC: NORMAL MG/DL
HGB UR QL STRIP.AUTO: NEGATIVE
KETONES UR-MCNC: NEGATIVE MG/DL
LEUKOCYTE ESTERASE UR QL STRIP.AUTO: NEGATIVE
NITRITE UR QL STRIP.AUTO: NEGATIVE
OSMOLALITY SERPL CALC.SUM OF ELEC: 287 MOSM/KG (ref 275–295)
PH UR: 7.5 [PH] (ref 5–8)
POTASSIUM SERPL-SCNC: 4.1 MMOL/L (ref 3.5–5.1)
PROT UR-MCNC: NEGATIVE MG/DL
SODIUM SERPL-SCNC: 139 MMOL/L (ref 136–145)
SP GR UR STRIP: 1.01 (ref 1–1.03)
UROBILINOGEN UR STRIP-ACNC: NORMAL

## 2024-08-27 PROCEDURE — 80048 BASIC METABOLIC PNL TOTAL CA: CPT

## 2024-08-27 PROCEDURE — 87086 URINE CULTURE/COLONY COUNT: CPT

## 2024-08-27 PROCEDURE — 3078F DIAST BP <80 MM HG: CPT | Performed by: FAMILY MEDICINE

## 2024-08-27 PROCEDURE — 81003 URINALYSIS AUTO W/O SCOPE: CPT

## 2024-08-27 PROCEDURE — 99214 OFFICE O/P EST MOD 30 MIN: CPT | Performed by: FAMILY MEDICINE

## 2024-08-27 PROCEDURE — 36415 COLL VENOUS BLD VENIPUNCTURE: CPT

## 2024-08-27 PROCEDURE — 3074F SYST BP LT 130 MM HG: CPT | Performed by: FAMILY MEDICINE

## 2024-08-27 NOTE — PROGRESS NOTES
HPI: Hussein is a 43 year old male who presents for difficulties with urination.  Has weak flaw and dribbling at the end. Often has wetness in underwear after urination.  Has urgency but then amount is not proportional to the feeling he is. Has frequency but he drinks a lot of water. No problems with sexual function. No history of prostate cancer.     Feels like hearing is a problem. Had tubes as a child.     PMH:    Past Medical History:    ADHD    Allergic rhinitis    Asthma (HCC)    Cats    Dyslipidemia    Eczema    Essential hypertension    High blood pressure    Hypertension    Visual impairment    glasses/contacts      Alg:  Crab (diagnostic)   Meds:   Current Outpatient Medications on File Prior to Visit   Medication Sig Dispense Refill    busPIRone HCl 30 MG Oral Tab Take 1 tablet (30 mg total) by mouth 2 (two) times daily.      lisinopril-hydroCHLOROthiazide 10-12.5 MG Oral Tab Take 1 tablet by mouth daily. 90 tablet 3    Atomoxetine HCl 80 MG Oral Cap Take by mouth daily.      montelukast 10 MG Oral Tab Take 1 tablet (10 mg total) by mouth nightly. 90 tablet 3    amLODIPine 10 MG Oral Tab Take 1 tablet (10 mg total) by mouth once daily. (Patient taking differently: Take 1 tablet (10 mg total) by mouth every morning.) 90 tablet 3    Cholecalciferol (VITAMIN D3) 25 MCG (1000 UT) Oral Cap Take 1 tablet by mouth daily.      cetirizine 10 MG Oral Tab Take 1 tablet (10 mg total) by mouth daily. (Patient taking differently: Take 1 tablet (10 mg total) by mouth every morning.) 90 tablet 3    Multiple Vitamins-Minerals (MENS MULTI VITAMIN & MINERAL) Oral Tab Take by mouth.      Mometasone Furoate (ELOCON) 0.1 % External Cream Apply 1 Application  topically 2 (two) times daily as needed. (Patient not taking: Reported on 8/27/2024) 50 g 1     No current facility-administered medications on file prior to visit.      Tobacco Use: no    ROS: see HPI    Objective:   Gen: AOx3. NAD.  /76   Pulse 102   Temp 98.4 °F  (36.9 °C) (Temporal)   Resp 16   Wt 223 lb (101.2 kg)   BMI 31.10 kg/m²       Assessment:/Plan:  Encounter Diagnoses   Name Primary?    Abnormal urination    Sounds like possible BPH.  Will check BMP, PSA, urine studies.  Refer to Urology for full exam   Yes    Bilateral hearing loss, unspecified hearing loss type    Refer to Audiology for exam.      Colleen Weiler, DO

## 2024-08-28 NOTE — PROGRESS NOTES
[facility]  Follow Up Visit    HPI:   Hussein Davila is a 43 year old male here today for urinary issues. The patient was last seen by Dr Singh on 2020 for vasectomy follow-up.     Patient complains of weak stream, dribbling, and urgency. Was seen by his primary on , who ordered a UA and culture, both of which were normal.  Nocturia x 1-2  Frequency q 2-3 hours  Duration:  past 6 months    denies any modifying factors    denies symptoms of fever.  denies dysuria  denies hematuria.  denies incontinence.   denies uti's    IPSS 14 (///) with QoL Score of 4. PVR 2 mL.    Caffeine intake:  1-2 coffees per day, occasional soda  Bowel function:  regular    Prior Treatment:    Medications:  no   Surgical therapies:  no    Family history   Prostate cancer:  no   Enlarged prostate:  no      PSA history:  0.48     Past Medical History:    ADHD    Allergic rhinitis    Asthma (HCC)    Cats    Dyslipidemia    Eczema    Essential hypertension    High blood pressure    Hypertension    Visual impairment    glasses/contacts     Past Surgical History:   Procedure Laterality Date    Colonoscopy N/A 2024    Procedure: COLONOSCOPY;  Surgeon: Snehal Magdaleno MD;  Location: [facility] MAIN OR    Other  Childhood    Bilateral myringotomy tubes    Vasectomy       Family History   Problem Relation Age of Onset    Asthma Mother     Other (Other) Mother         asthma    Diabetes Father     Hypertension Father     Other (CVA) Father         Age 70s    Diabetes Brother     Colon Cancer Paternal Grandmother     Heart Disease Maternal Grandfather          age 60s     Social History     Socioeconomic History    Marital status:    Occupational History    Occupation: Manager   Tobacco Use    Smoking status: Former     Current packs/day: 0.00     Average packs/day: 1 pack/day for 6.0 years (6.0 ttl pk-yrs)     Types: Cigarettes     Start date: 3/20/1999     Quit date: 3/20/2005     Years since  quittin.4     Passive exposure: Never    Smokeless tobacco: Current   Vaping Use    Vaping status: Some Days    Substances: CBD   Substance and Sexual Activity    Alcohol use: Not Currently     Comment: Occasional beer    Drug use: Yes     Types: Cannabis     Comment: vape   Other Topics Concern    Caffeine Concern Yes     Comment: coffee     Current Outpatient Medications   Medication Sig Dispense Refill    busPIRone HCl 30 MG Oral Tab Take 1 tablet (30 mg total) by mouth 2 (two) times daily.      lisinopril-hydroCHLOROthiazide 10-12.5 MG Oral Tab Take 1 tablet by mouth daily. 90 tablet 3    Mometasone Furoate (ELOCON) 0.1 % External Cream Apply 1 Application  topically 2 (two) times daily as needed. (Patient not taking: Reported on 2024) 50 g 1    Atomoxetine HCl 80 MG Oral Cap Take by mouth daily.      montelukast 10 MG Oral Tab Take 1 tablet (10 mg total) by mouth nightly. 90 tablet 3    amLODIPine 10 MG Oral Tab Take 1 tablet (10 mg total) by mouth once daily. (Patient taking differently: Take 1 tablet (10 mg total) by mouth every morning.) 90 tablet 3    Cholecalciferol (VITAMIN D3) 25 MCG (1000 UT) Oral Cap Take 1 tablet by mouth daily.      cetirizine 10 MG Oral Tab Take 1 tablet (10 mg total) by mouth daily. (Patient taking differently: Take 1 tablet (10 mg total) by mouth every morning.) 90 tablet 3    Multiple Vitamins-Minerals (MENS MULTI VITAMIN & MINERAL) Oral Tab Take by mouth.         Allergies: Crab (diagnostic)    REVIEW OF SYSTEMS:  Review of Systems   All other systems reviewed and are negative.        EXAM:  There were no vitals taken for this visit.    Physical Exam  Constitutional:       Appearance: Normal appearance.   HENT:      Head: Normocephalic and atraumatic.      Mouth/Throat:      Mouth: Mucous membranes are moist.   Pulmonary:      Effort: Pulmonary effort is normal.   Abdominal:      General: Abdomen is flat.      Palpations: Abdomen is soft.   Genitourinary:     Prostate:  Enlarged. Not tender and no nodules present.   Skin:     General: Skin is warm and dry.   Neurological:      Mental Status: He is alert and oriented to person, place, and time.   Psychiatric:         Mood and Affect: Mood normal.         Behavior: Behavior normal.         Thought Content: Thought content normal.         Judgment: Judgment normal.          LABS:  No results found for: \"PSA\", \"QPSA\", \"TOTPSASCREEN\"  Lab Results   Component Value Date    WBC 10.2 04/29/2024    RBC 4.98 04/29/2024    HGB 14.5 04/29/2024    HCT 43.0 04/29/2024    MCV 86.3 04/29/2024    MCH 29.1 04/29/2024    MCHC 33.7 04/29/2024    RDW 12.7 04/29/2024    .0 04/29/2024    MPV 8.3 06/20/2018     Lab Results   Component Value Date    GLU 95 08/27/2024    BUN 11 08/27/2024    BUNCREA 10.9 08/27/2024    CREATSERUM 1.01 08/27/2024    ANIONGAP 5 08/27/2024    GFRNAA 94 02/08/2021    GFRAA 108 02/08/2021    CA 10.6 (H) 08/27/2024     08/27/2024    K 4.1 08/27/2024     08/27/2024    CO2 29.0 08/27/2024     Lab Results   Component Value Date    PTP 12.8 04/29/2024    INR 0.91 04/29/2024       IMAGING:  XR CERVICAL SPINE COMPLETE W/FLEX + EXT (CPT=72052)    Result Date: 8/6/2024  PROCEDURE: XR CERVICAL SPINE COMPLETE W/FLEX+EXT (CPT=72052)  COMPARISON: [facility], MRI SPINE CERVICAL (CPT=72141), 4/04/2024, 2:18 PM.  [facility], XR CERVICAL SPINE (2-3 VIEWS) (CPT=72040), 3/28/2024, 9:10 AM.  [facility], XR CERVICAL SPINE (2 VIEWS) (CPT=72040), 4/06/2021, 10:36 AM.  INDICATIONS: Cervical radiculopathy; follow up surgery 05/2024.  TECHNIQUE: Cervical spine radiographs (7 views)  FINDINGS:  ALIGNMENT: The cervical lordosis is reversed with apex at the C3-C4 level. ATLANTOAXIAL: The odontoid and atlantoaxial joints are unremarkable.  VERTEBRAL BODIES: There is no evidence of fracture or radiographically apparent osseous lesion. The vertebral body heights are  preserved. Multilevel anterior osteophyte formation is present. DISC SPACES: An intervertebral disc spacer device is present at C5-C6. PREVERTEBRAL SOFT TISSUES: Negative for swelling or radiopaque foreign body.  OBLIQUE VIEWS: No significant neuroforaminal stenosis is appreciated. FLEXION/EXTENSION VIEWS: There is no evidence of provoked subluxation on dynamic views. OTHER:   Visualized portions of the lung apices are grossly clear. Dental amalgam is noted.          CONCLUSION:  1. An intervertebral disc spacer replacement is present at the C5-C6 level. Hardware appears grossly radiographically uncomplicated.  2. No evidence of instability on dynamic views.   3. No radiographically visible acute osseous injury of the cervical spine.    Dictated by (CST): Lázaro Sanchez MD on 8/06/2024 at 6:17 PM     Finalized by (CST): Lázaro Sanchez MD on 8/06/2024 at 6:21 PM            IMPRESSION:  BPH with LUTS      PLAN:  - Begin Tamsulosin 0.4 mg every day  - Bladder behaviors  - Follow-up in 3 months for symptom check    NIKHIL Neely  8/30/2024

## 2024-08-30 ENCOUNTER — OFFICE VISIT (OUTPATIENT)
Dept: SURGERY | Facility: CLINIC | Age: 44
End: 2024-08-30
Payer: COMMERCIAL

## 2024-08-30 DIAGNOSIS — N40.1 BPH WITH OBSTRUCTION/LOWER URINARY TRACT SYMPTOMS: Primary | ICD-10-CM

## 2024-08-30 DIAGNOSIS — N13.8 BPH WITH OBSTRUCTION/LOWER URINARY TRACT SYMPTOMS: Primary | ICD-10-CM

## 2024-08-30 PROCEDURE — 99203 OFFICE O/P NEW LOW 30 MIN: CPT

## 2024-08-30 RX ORDER — TAMSULOSIN HYDROCHLORIDE 0.4 MG/1
0.4 CAPSULE ORAL DAILY
Qty: 90 CAPSULE | Refills: 3 | Status: SHIPPED | OUTPATIENT
Start: 2024-08-30 | End: 2025-08-25

## 2024-08-30 NOTE — PATIENT INSTRUCTIONS
Behavioral management includes timed voiding (going to the bathroom on a schedule every 1-4 hours), double voiding (trying to pee twice), avoiding bladder irritants (coffee, tea, soda, pop, alcohol), compression stockings, elevation of feet, voiding prior to bedtime, avoiding fluids 3 to 4 hours before bedtime and constipation management.   Start taking Tamsulosin 0.4 mg every night. Be aware of potential for lightheadedness/dizziness, especially when changing positions when you first start it.  Follow-up in 3 months

## 2024-09-29 NOTE — TELEPHONE ENCOUNTER
No he does not [FreeTextEntry1] : DEBORAH HENRY is a 32 year old female who presents today s/p Panniculectomy/Abjwc-ce-Nkj skin excision on 4/15/24. 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 She aso inquires about brachioplasty and mastopexy techniques.

## 2024-10-18 ENCOUNTER — IMMUNIZATION (OUTPATIENT)
Dept: LAB | Age: 44
End: 2024-10-18
Attending: EMERGENCY MEDICINE
Payer: COMMERCIAL

## 2024-10-18 DIAGNOSIS — Z23 NEED FOR VACCINATION: Primary | ICD-10-CM

## 2024-10-18 PROCEDURE — 90471 IMMUNIZATION ADMIN: CPT

## 2024-10-18 PROCEDURE — 90656 IIV3 VACC NO PRSV 0.5 ML IM: CPT

## 2024-10-18 PROCEDURE — 90480 ADMN SARSCOV2 VAC 1/ONLY CMP: CPT

## 2024-10-23 ENCOUNTER — OFFICE VISIT (OUTPATIENT)
Dept: AUDIOLOGY | Facility: CLINIC | Age: 44
End: 2024-10-23
Payer: COMMERCIAL

## 2024-10-23 DIAGNOSIS — H90.12 CONDUCTIVE HEARING LOSS OF LEFT EAR WITH UNRESTRICTED HEARING OF RIGHT EAR: Primary | ICD-10-CM

## 2024-10-23 DIAGNOSIS — R94.128 ABNORMAL HEARING TEST: Primary | ICD-10-CM

## 2024-10-23 DIAGNOSIS — Z01.118 ABNORMAL HEARING TEST: Primary | ICD-10-CM

## 2024-10-23 PROCEDURE — 92567 TYMPANOMETRY: CPT | Performed by: AUDIOLOGIST

## 2024-10-23 PROCEDURE — 92557 COMPREHENSIVE HEARING TEST: CPT | Performed by: AUDIOLOGIST

## 2024-11-12 ENCOUNTER — OFFICE VISIT (OUTPATIENT)
Dept: SURGERY | Facility: CLINIC | Age: 44
End: 2024-11-12
Payer: COMMERCIAL

## 2024-11-12 ENCOUNTER — HOSPITAL ENCOUNTER (OUTPATIENT)
Dept: GENERAL RADIOLOGY | Facility: HOSPITAL | Age: 44
Discharge: HOME | End: 2024-11-12
Attending: NEUROLOGICAL SURGERY
Payer: COMMERCIAL

## 2024-11-12 VITALS
OXYGEN SATURATION: 97 % | HEIGHT: 71 IN | BODY MASS INDEX: 31.22 KG/M2 | SYSTOLIC BLOOD PRESSURE: 138 MMHG | DIASTOLIC BLOOD PRESSURE: 89 MMHG | HEART RATE: 106 BPM | WEIGHT: 223 LBS

## 2024-11-12 DIAGNOSIS — Z98.890 S/P CERVICAL DISC REPLACEMENT: Primary | ICD-10-CM

## 2024-11-12 DIAGNOSIS — Z98.890 S/P CERVICAL DISC REPLACEMENT: ICD-10-CM

## 2024-11-12 PROCEDURE — 3079F DIAST BP 80-89 MM HG: CPT | Performed by: NEUROLOGICAL SURGERY

## 2024-11-12 PROCEDURE — 3008F BODY MASS INDEX DOCD: CPT | Performed by: NEUROLOGICAL SURGERY

## 2024-11-12 PROCEDURE — 3075F SYST BP GE 130 - 139MM HG: CPT | Performed by: NEUROLOGICAL SURGERY

## 2024-11-12 PROCEDURE — 99214 OFFICE O/P EST MOD 30 MIN: CPT | Performed by: NEUROLOGICAL SURGERY

## 2024-11-12 PROCEDURE — 72052 X-RAY EXAM NECK SPINE 6/>VWS: CPT | Performed by: NEUROLOGICAL SURGERY

## 2024-11-12 NOTE — PROGRESS NOTES
PAM STONE M.D., F.A.A.N.S.     of Neurosurgery  [facility]  Board Certified Neurosurgeon                          [facility], [facility], [facility] Neurosurgery        [address]  [address] [facility]  [place] [address]    PHONE  [phone]          FAX  [phone]    [email or web address]      OFFICE FOLLOW-UP NOTE      Hussein Davila    : 1980    MRN: [id number]  CSN: [id number]      PCP: Colleen Weiler, DO  Referring Provider: Caorlynn Knight    Insurance: Payor: [facility] HMO / Plan: [facility] BLUE ADVANTAGE HMO / Product Type: HMO /     Date of Visit: 2024    Reason for Visit:   Chief Complaint    Follow - Up                         History of Present Care:  Hussein Davila is a a(n) 43 year old, male.  Our patient is doing very well 6 months following his C5-6 anterior cervical disc replacement.  He does report some occasional tingling in the thenar distribution of his forearm but it does not bother him.  He is here with his 6-month postop x-ray.      History:  .  Past Medical History:    ADHD    Allergic rhinitis    Asthma (HCC)    Cats    Dyslipidemia    Eczema    Essential hypertension    High blood pressure    Hypertension    Visual impairment    glasses/contacts      Past Surgical History:   Procedure Laterality Date    Colonoscopy N/A 2024    Procedure: COLONOSCOPY;  Surgeon: Snehal Magdaleno MD;  Location: [facility] MAIN OR    Other  Childhood    Bilateral myringotomy tubes    Vasectomy        Family History   Problem Relation Age of Onset    Asthma Mother     Other (Other) Mother         asthma    Diabetes Father     Hypertension Father     Other (CVA) Father         Age 70s    Diabetes Brother     Colon Cancer Paternal Grandmother     Heart Disease Maternal Grandfather          age 60s      Social History      Socioeconomic History    Marital status:      Spouse name: Not on file    Number of children: Not on file    Years of education: Not on file    Highest education level: Not on file   Occupational History    Occupation: Manager   Tobacco Use    Smoking status: Former     Current packs/day: 0.00     Average packs/day: 1 pack/day for 6.0 years (6.0 ttl pk-yrs)     Types: Cigarettes     Start date: 3/20/1999     Quit date: 3/20/2005     Years since quittin.6     Passive exposure: Never    Smokeless tobacco: Current   Vaping Use    Vaping status: Some Days    Substances: CBD   Substance and Sexual Activity    Alcohol use: Not Currently     Comment: Occasional beer    Drug use: Yes     Types: Cannabis     Comment: vape    Sexual activity: Not on file   Other Topics Concern     Service Not Asked    Blood Transfusions Not Asked    Caffeine Concern Yes     Comment: coffee    Occupational Exposure Not Asked    Hobby Hazards Not Asked    Sleep Concern Not Asked    Stress Concern Not Asked    Weight Concern Not Asked    Special Diet Not Asked    Back Care Not Asked    Exercise Not Asked    Bike Helmet Not Asked    Seat Belt Not Asked    Self-Exams Not Asked   Social History Narrative    Not on file     Social Drivers of Health     Financial Resource Strain: Not on file   Food Insecurity: Not on file   Transportation Needs: Not on file   Physical Activity: Not on file   Stress: Not on file   Social Connections: Not on file   Housing Stability: Not on file        Allergies:  Allergies[1]      Medications:  Current Outpatient Medications   Medication Sig Dispense Refill    Atomoxetine HCl 100 MG Oral Cap Take 1 capsule (100 mg total) by mouth daily.      tamsulosin 0.4 MG Oral Cap Take 1 capsule (0.4 mg total) by mouth daily. Take 1/2 hour following the same meal each day 90 capsule 3    busPIRone HCl 30 MG Oral Tab Take 1 tablet (30 mg total) by mouth 2 (two) times daily.       lisinopril-hydroCHLOROthiazide 10-12.5 MG Oral Tab Take 1 tablet by mouth daily. 90 tablet 3    Mometasone Furoate (ELOCON) 0.1 % External Cream Apply 1 Application  topically 2 (two) times daily as needed. (Patient not taking: Reported on 8/27/2024) 50 g 1    Atomoxetine HCl 80 MG Oral Cap Take by mouth daily.      montelukast 10 MG Oral Tab Take 1 tablet (10 mg total) by mouth nightly. 90 tablet 3    amLODIPine 10 MG Oral Tab Take 1 tablet (10 mg total) by mouth once daily. (Patient taking differently: Take 1 tablet (10 mg total) by mouth every morning.) 90 tablet 3    Cholecalciferol (VITAMIN D3) 25 MCG (1000 UT) Oral Cap Take 1 tablet by mouth daily.      cetirizine 10 MG Oral Tab Take 1 tablet (10 mg total) by mouth daily. (Patient taking differently: Take 1 tablet (10 mg total) by mouth every morning.) 90 tablet 3    Multiple Vitamins-Minerals (MENS MULTI VITAMIN & MINERAL) Oral Tab Take by mouth.          Review of Systems:  A 10-point system was reviewed.  Pertinent positives and negatives are noted in HPI.      Physical Exam:  /89   Pulse 106   Ht 71\"   Wt 223 lb (101.2 kg)   SpO2 97%   BMI 31.10 kg/m²         Neurological Exam:    AAOx3, following commands  PERRLA  EOMI  Face symmetrical  Tongue midline  Hearing symmetrical and intact to finger rub    No rhinorrhea or otorrhea    Romberg negative    Motor Strength:  Intact in all extremities    Sensation to light touch:  Symmetrical and intact in all extremities    Incision:  The incision is clean dry and intact    Abdomen:  Soft, non-distended, non-tender, with no rebound or guarding.  No peritoneal signs.     Extremities:  Non-tender, no lower extremity edema noted.      Labs:  CBC:  Lab Results   Component Value Date    WBC 10.2 04/29/2024    HGB 14.5 04/29/2024    HCT 43.0 04/29/2024    MCV 86.3 04/29/2024    .0 04/29/2024      BMG:  Lab Results   Component Value Date     08/27/2024    K 4.1 08/27/2024    CO2 29.0 08/27/2024      08/27/2024    BUN 11 08/27/2024      INR:  Lab Results   Component Value Date    INR 0.91 04/29/2024          Diagnostics:  I reviewed an x-ray of the cervical spine with evidence of cervical disc arthroplasty at C5-6.  The artificial disc behaves appropriately during flexion and extension films.  There is no evidence of any complications.    Diagnosis:  1. S/P cervical disc replacement      Assessment/Plan:  Our patient is doing very well at this point in time and is released from our care.  The x-ray appears satisfactory.  He is encouraged to call with any questions or issues in the future.    More than 30 minutes were spent during this visit and the coordination of this patient's care. All questions and concerns were addressed. We appreciate the opportunity to participate in the care of this patient. Please do not hesitate to call our office ([phone]) with any issues.        Skyler Shankar M.D., F.A.A.N.S.    11/12/2024  3:17 PM    This note has been dictated utilizing voice recognition software. Unfortunately, this may lead to occasional typographical errors. If there are any questions regarding this, please do not hesitate to contact our office.        [1]   Allergies  Allergen Reactions    Crab (Diagnostic) HIVES

## 2024-12-02 NOTE — PROGRESS NOTES
[facility]  Follow Up Visit    HPI:   Hussein Davila is a 43 year old male here today for urinary issues. The patient was last seen on 2024.     Patient states symptoms have drastically. Has been taking Tamsulosin 0.4 mg every day. C/o occasional leakage, however feels his symptoms are 90% better.    Previous IPSS 14 (2//4/1//) with QoL Score of 4. PVR 2 mL.  Current IPSS 3 (0//0/0/1/) with QoL Score of 1.     Caffeine intake:  1-2 coffees per day, occasional soda  Bowel function:  regular    PSA history:     PSA Screen   Latest Ref Rng <=4.00 ng/mL   2024 0.48          Past Medical History:    ADHD    Allergic rhinitis    Asthma (HCC)    Cats    Dyslipidemia    Eczema    Essential hypertension    High blood pressure    Hypertension    Visual impairment    glasses/contacts     Past Surgical History:   Procedure Laterality Date    Colonoscopy N/A 2024    Procedure: COLONOSCOPY;  Surgeon: Snehal Magdaleno MD;  [facility]    Other  Childhood    Bilateral myringotomy tubes    Vasectomy       Family History   Problem Relation Age of Onset    Asthma Mother     Other (Other) Mother         asthma    Diabetes Father     Hypertension Father     Other (CVA) Father         Age 70s    Diabetes Brother     Colon Cancer Paternal Grandmother     Heart Disease Maternal Grandfather          age 60s     Social History     Socioeconomic History    Marital status:    Occupational History    Occupation: Manager   Tobacco Use    Smoking status: Former     Current packs/day: 0.00     Average packs/day: 1 pack/day for 6.0 years (6.0 ttl pk-yrs)     Types: Cigarettes     Start date: 3/20/1999     Quit date: 3/20/2005     Years since quittin.7     Passive exposure: Never    Smokeless tobacco: Current   Vaping Use    Vaping status: Some Days    Substances: CBD   Substance and Sexual Activity    Alcohol use: Not Currently     Comment: Occasional beer    Drug use:  Yes     Types: Cannabis     Comment: vape   Other Topics Concern    Caffeine Concern Yes     Comment: coffee     Current Outpatient Medications   Medication Sig Dispense Refill    Atomoxetine HCl 100 MG Oral Cap Take 1 capsule (100 mg total) by mouth daily.      tamsulosin 0.4 MG Oral Cap Take 1 capsule (0.4 mg total) by mouth daily. Take 1/2 hour following the same meal each day 90 capsule 3    busPIRone HCl 30 MG Oral Tab Take 1 tablet (30 mg total) by mouth 2 (two) times daily.      lisinopril-hydroCHLOROthiazide 10-12.5 MG Oral Tab Take 1 tablet by mouth daily. 90 tablet 3    Mometasone Furoate (ELOCON) 0.1 % External Cream Apply 1 Application  topically 2 (two) times daily as needed. (Patient not taking: Reported on 8/27/2024) 50 g 1    Atomoxetine HCl 80 MG Oral Cap Take by mouth daily.      montelukast 10 MG Oral Tab Take 1 tablet (10 mg total) by mouth nightly. 90 tablet 3    amLODIPine 10 MG Oral Tab Take 1 tablet (10 mg total) by mouth once daily. (Patient taking differently: Take 1 tablet (10 mg total) by mouth every morning.) 90 tablet 3    Cholecalciferol (VITAMIN D3) 25 MCG (1000 UT) Oral Cap Take 1 tablet by mouth daily.      cetirizine 10 MG Oral Tab Take 1 tablet (10 mg total) by mouth daily. (Patient taking differently: Take 1 tablet (10 mg total) by mouth every morning.) 90 tablet 3    Multiple Vitamins-Minerals (MENS MULTI VITAMIN & MINERAL) Oral Tab Take by mouth.         Allergies: Crab (diagnostic)    REVIEW OF SYSTEMS:  Review of Systems   All other systems reviewed and are negative.        EXAM:  There were no vitals taken for this visit.    Physical Exam  Constitutional:       Appearance: Normal appearance.   HENT:      Head: Normocephalic and atraumatic.      Mouth/Throat:      Mouth: Mucous membranes are moist.   Pulmonary:      Effort: Pulmonary effort is normal.   Abdominal:      General: Abdomen is flat.      Palpations: Abdomen is soft.   Skin:     General: Skin is warm and dry.    Neurological:      Mental Status: He is alert and oriented to person, place, and time.   Psychiatric:         Mood and Affect: Mood normal.         Behavior: Behavior normal.         Thought Content: Thought content normal.         Judgment: Judgment normal.          LABS:  No results found for: \"PSA\", \"QPSA\", \"TOTPSASCREEN\"  Lab Results   Component Value Date    WBC 10.2 04/29/2024    RBC 4.98 04/29/2024    HGB 14.5 04/29/2024    HCT 43.0 04/29/2024    MCV 86.3 04/29/2024    MCH 29.1 04/29/2024    MCHC 33.7 04/29/2024    RDW 12.7 04/29/2024    .0 04/29/2024    MPV 8.3 06/20/2018     Lab Results   Component Value Date    GLU 95 08/27/2024    BUN 11 08/27/2024    BUNCREA 10.9 08/27/2024    CREATSERUM 1.01 08/27/2024    ANIONGAP 5 08/27/2024    GFRNAA 94 02/08/2021    GFRAA 108 02/08/2021    CA 10.6 (H) 08/27/2024     08/27/2024    K 4.1 08/27/2024     08/27/2024    CO2 29.0 08/27/2024     Lab Results   Component Value Date    PTP 12.8 04/29/2024    INR 0.91 04/29/2024       IMAGING:  XR CERVICAL SPINE COMPLETE W/FLEX + EXT (CPT=72052)    Result Date: 8/6/2024  PROCEDURE: XR CERVICAL SPINE COMPLETE W/FLEX+EXT (CPT=72052)  COMPARISON: [facility], MRI SPINE CERVICAL (CPT=72141), 4/04/2024, 2:18 PM.  [facility], XR CERVICAL SPINE (2-3 VIEWS) (CPT=72040), 3/28/2024, 9:10 AM.  [facility], XR CERVICAL SPINE (2 VIEWS) (CPT=72040), 4/06/2021, 10:36 AM.  INDICATIONS: Cervical radiculopathy; follow up surgery 05/2024.  TECHNIQUE: Cervical spine radiographs (7 views)  FINDINGS:  ALIGNMENT: The cervical lordosis is reversed with apex at the C3-C4 level. ATLANTOAXIAL: The odontoid and atlantoaxial joints are unremarkable.  VERTEBRAL BODIES: There is no evidence of fracture or radiographically apparent osseous lesion. The vertebral body heights are preserved. Multilevel anterior osteophyte formation is present. DISC SPACES: An  intervertebral disc spacer device is present at C5-C6. PREVERTEBRAL SOFT TISSUES: Negative for swelling or radiopaque foreign body.  OBLIQUE VIEWS: No significant neuroforaminal stenosis is appreciated. FLEXION/EXTENSION VIEWS: There is no evidence of provoked subluxation on dynamic views. OTHER:   Visualized portions of the lung apices are grossly clear. Dental amalgam is noted.          CONCLUSION:  1. An intervertebral disc spacer replacement is present at the C5-C6 level. Hardware appears grossly radiographically uncomplicated.  2. No evidence of instability on dynamic views.   3. No radiographically visible acute osseous injury of the cervical spine.    Dictated by (CST): Lázaro Sanchez MD on 8/06/2024 at 6:17 PM     Finalized by (CST): Lázaro Sanchez MD on 8/06/2024 at 6:21 PM            IMPRESSION:  BPH with LUTS      PLAN:  - Continue Tamsulosin 0.4 mg every day  - Bladder behaviors  - Follow-up in one year     NIKHIL Neely  12/03/2024

## 2024-12-03 ENCOUNTER — OFFICE VISIT (OUTPATIENT)
Dept: SURGERY | Facility: CLINIC | Age: 44
End: 2024-12-03
Payer: COMMERCIAL

## 2024-12-03 ENCOUNTER — OFFICE VISIT (OUTPATIENT)
Dept: OTOLARYNGOLOGY | Facility: CLINIC | Age: 44
End: 2024-12-03
Payer: COMMERCIAL

## 2024-12-03 VITALS — WEIGHT: 223 LBS | BODY MASS INDEX: 31.22 KG/M2 | HEIGHT: 71 IN

## 2024-12-03 DIAGNOSIS — N40.1 BPH WITH OBSTRUCTION/LOWER URINARY TRACT SYMPTOMS: Primary | ICD-10-CM

## 2024-12-03 DIAGNOSIS — N13.8 BPH WITH OBSTRUCTION/LOWER URINARY TRACT SYMPTOMS: Primary | ICD-10-CM

## 2024-12-03 DIAGNOSIS — H90.12 CONDUCTIVE HEARING LOSS OF LEFT EAR WITH UNRESTRICTED HEARING OF RIGHT EAR: Primary | ICD-10-CM

## 2024-12-03 PROCEDURE — 99213 OFFICE O/P EST LOW 20 MIN: CPT

## 2024-12-03 PROCEDURE — 99203 OFFICE O/P NEW LOW 30 MIN: CPT | Performed by: OTOLARYNGOLOGY

## 2024-12-03 PROCEDURE — 3008F BODY MASS INDEX DOCD: CPT | Performed by: OTOLARYNGOLOGY

## 2024-12-03 NOTE — PROGRESS NOTES
Hussein Davila is a 43 year old male.    Chief Complaint   Patient presents with    Follow - Up     Discuss hearing test results from 10/23/2024       HISTORY OF PRESENT ILLNESS  He presents with a history of worsening facial pressure discomfort nasal congestion.  He has been on Allegra Flonase and Singulair does not use Sudafed due to blood pressure issues.  Constantly has facial pressure between his eyes and on his cheeks as well as laterally in his temples.  TMJ issues?  He denies grinding or clenching.  No nasal mucopurulence no other signs or symptoms suggestive of infection.  He has been on antibiotics without any improvement of his symptoms at all.     1/12/21 he presents today to go over the results of his CT scan.  He did use Allegra Flonase Singulair as well as the steroids prescribed to him at his last visit with little help in his symptoms.  Continues to have facial pressure between his eyes and his cheeks as well as the temples.  I have suspected possible TMJ related issues which he does not feel that is likely.  He underwent a CT scan to rule out sinus disease as he felt that he had chronic sinusitis with CT scan images reviewed with patient today demonstrating no evidence of chronic or acute sinus disease.  He does have some mucosal thickening of the sinuses consistent with some type of congestive process such as allergic rhinitis deviated septum to the left and a poncho bullosa.  No other abnormalities noted to explain his severe facial pain and discomfort.     12/3/24 he presents today with complaints of hearing loss on the left side.  Concerned about possible relationship to neurologic dysfunction such as Alzheimer's and neurodegenerative disease.  Audiogram performed ordered by his primary care physician Dr. Weiler with a finding of normal hearing on the right and normal pure-tone thresholds on the left but a mild downsloping to moderate upsloping to mild conductive hearing loss on  the left side.  Normal tympanograms bilaterally with normal middle ear pressures.  No family history of otosclerosis.  He does states that he has had progressive hearing loss in the left ear since he was a child.  Since then he does recall having tubes and is not sure if he has had some type of repair of the eardrum with a fat patch myringoplasty as a child.      Social History     Socioeconomic History    Marital status:    Occupational History    Occupation: Manager   Tobacco Use    Smoking status: Former     Current packs/day: 0.00     Average packs/day: 1 pack/day for 6.0 years (6.0 ttl pk-yrs)     Types: Cigarettes     Start date: 3/20/1999     Quit date: 3/20/2005     Years since quittin.7     Passive exposure: Never    Smokeless tobacco: Current   Vaping Use    Vaping status: Some Days    Substances: CBD   Substance and Sexual Activity    Alcohol use: Not Currently     Comment: Occasional beer    Drug use: Yes     Types: Cannabis     Comment: vape   Other Topics Concern    Caffeine Concern Yes     Comment: coffee       Family History   Problem Relation Age of Onset    Asthma Mother     Other (Other) Mother         asthma    Diabetes Father     Hypertension Father     Other (CVA) Father         Age 70s    Diabetes Brother     Colon Cancer Paternal Grandmother     Heart Disease Maternal Grandfather          age 60s       Past Medical History:    ADHD    Allergic rhinitis    Asthma (HCC)    Cats    Dyslipidemia    Eczema    Essential hypertension    High blood pressure    Hypertension    Visual impairment    glasses/contacts       Past Surgical History:   Procedure Laterality Date    Colonoscopy N/A 2024    Procedure: COLONOSCOPY;  Surgeon: Snehal Magdaleno MD;  Location: [facility] MAIN OR    Other  Childhood    Bilateral myringotomy tubes    Vasectomy           REVIEW OF SYSTEMS    System Neg/Pos Details   Constitutional Negative Fatigue, fever and weight loss.   ENMT Negative Drooling.   Eyes  Negative Blurred vision and vision changes.   Respiratory Negative Dyspnea and wheezing.   Cardio Negative Chest pain, irregular heartbeat/palpitations and syncope.   GI Negative Abdominal pain and diarrhea.   Endocrine Negative Cold intolerance and heat intolerance.   Neuro Negative Tremors.   Psych Negative Anxiety and depression.   Integumentary Negative Frequent skin infections, pigment change and rash.   Hema/Lymph Negative Easy bleeding and easy bruising.           PHYSICAL EXAM    Ht 5' 11\" (1.803 m)   Wt 223 lb (101.2 kg)   BMI 31.10 kg/m²        Constitutional Normal Overall appearance - Normal.   Psychiatric Normal Orientation - Oriented to time, place, person & situation. Appropriate mood and affect.   Neck Exam Normal Inspection - Normal. Palpation - Normal. Parotid gland - Normal. Thyroid gland - Normal.   Eyes Normal Conjunctiva - Right: Normal, Left: Normal. Pupil - Right: Normal, Left: Normal. Fundus - Right: Normal, Left: Normal.   Neurological Normal Memory - Normal. Cranial nerves - Cranial nerves II through XII grossly intact.   Head/Face Normal Facial features - Normal. Eyebrows - Normal. Skull - Normal.        Nasopharynx Normal External nose - Normal. Lips/teeth/gums - Normal. Tonsils - Normal. Oropharynx - Normal.   Ears Normal Inspection - Right: Normal, Left: Normal. Canal - Right: Normal, Left: Normal. TM - Right: Inferior tympanosclerosis left: Mild inferior tympanosclerosis.  He does have a small abnormality noted within the anterior superior tympanic membrane.  Fat patch?   Skin Normal Inspection - Normal.        Lymph Detail Normal Submental. Submandibular. Anterior cervical. Posterior cervical. Supraclavicular.        Nose/Mouth/Throat Normal External nose - Normal. Lips/teeth/gums - Normal. Tonsils - Normal. Oropharynx - Normal.   Nose/Mouth/Throat Normal Nares - Right: Normal Left: Normal. Septum -Normal  Turbinates - Right: Normal, Left: Normal.       Current Outpatient  Medications:     Atomoxetine HCl 100 MG Oral Cap, Take 1 capsule (100 mg total) by mouth daily., Disp: , Rfl:     tamsulosin 0.4 MG Oral Cap, Take 1 capsule (0.4 mg total) by mouth daily. Take 1/2 hour following the same meal each day, Disp: 90 capsule, Rfl: 3    busPIRone HCl 30 MG Oral Tab, Take 1 tablet (30 mg total) by mouth 2 (two) times daily., Disp: , Rfl:     lisinopril-hydroCHLOROthiazide 10-12.5 MG Oral Tab, Take 1 tablet by mouth daily., Disp: 90 tablet, Rfl: 3    Atomoxetine HCl 80 MG Oral Cap, Take by mouth daily., Disp: , Rfl:     montelukast 10 MG Oral Tab, Take 1 tablet (10 mg total) by mouth nightly., Disp: 90 tablet, Rfl: 3    Cholecalciferol (VITAMIN D3) 25 MCG (1000 UT) Oral Cap, Take 1 tablet by mouth daily., Disp: , Rfl:     Multiple Vitamins-Minerals (MENS MULTI VITAMIN & MINERAL) Oral Tab, Take by mouth., Disp: , Rfl:     Mometasone Furoate (ELOCON) 0.1 % External Cream, Apply 1 Application  topically 2 (two) times daily as needed. (Patient not taking: Reported on 8/27/2024), Disp: 50 g, Rfl: 1    amLODIPine 10 MG Oral Tab, Take 1 tablet (10 mg total) by mouth once daily. (Patient not taking: Reported on 12/3/2024), Disp: 90 tablet, Rfl: 3    cetirizine 10 MG Oral Tab, Take 1 tablet (10 mg total) by mouth daily. (Patient not taking: Reported on 12/3/2024), Disp: 90 tablet, Rfl: 3  ASSESSMENT AND PLAN    1. Conductive hearing loss of left ear with unrestricted hearing of right ear  Mild downsloping to moderate upsloping to mild conductive hearing loss on the left.  Otosclerosis?  Ossicular scarring?  He does have some tympanosclerosis bilaterally but this is fairly mild.  On the left interestingly he does have a small little growth within the tympanic membrane itself anteriorly and superiorly.  He is not sure if he has had some type of fat patch myringoplasty as a child but unclear if there would still be fat present 40 years later.  Does not appear to be cholesteatoma.  I did recommend  repeating a hearing test in 1 year but return to see me in 6 months for reevaluation of his tympanic membrane on the left.  He agrees with this plan.  In addition we did discuss amplification I did give him a medical clearance letter as well as a copy of his hearing test and he will contact his insurance to see what type of hearing aid benefits are available to him.        This note was prepared using [employer] voice recognition dictation software. As a result errors may occur. When identified these errors have been corrected. While every attempt is made to correct errors during dictation discrepancies may still exist    Roger Espinoza MD    12/3/2024    2:52 PM

## 2024-12-18 RX ORDER — CETIRIZINE HYDROCHLORIDE 10 MG/1
10 TABLET ORAL DAILY
Qty: 90 TABLET | Refills: 3 | Status: SHIPPED | OUTPATIENT
Start: 2024-12-18

## 2024-12-18 NOTE — TELEPHONE ENCOUNTER
Refill passed per [facility] protocol.    Requested Prescriptions   Pending Prescriptions Disp Refills    CETIRIZINE 10 MG Oral Tab [Pharmacy Med Name: Cetirizine Hydrochloride 10 Mg Tab Nort] 90 tablet 0     Sig: TAKE ONE TABLET BY MOUTH ONE TIME DAILY       Allergy Medication Protocol Passed - 12/18/2024  5:41 PM        Passed - In person appointment or virtual visit in the past 12 mos or appointment in next 3 mos     Recent Outpatient Visits              2 weeks ago Conductive hearing loss of left ear with [facility] hearing [facility] right [facility]    [facility]    2 weeks ago BPH with obstruction/lower urinary tract symptoms    [facility] Kriss Rousseau APRN    Office Visit    1 month ago S/P cervical disc replacement    [facility] Skyler Shankar MD    Office Visit    1 month ago Conductive hearing loss of left ear with unrestricted hearing of right ear    [facility] Chyna Soliz Au.D    Office Visit    3 months ago BPH with obstruction/lower urinary tract symptoms    [facility] Kriss Rousseau APRN    Office Visit          Future Appointments         Provider Department Appt Notes    In 3 weeks Weiler, Colleen M, DO [facility] Annual physical  [place] physical 1/2/24 -[place]    In 5 months Roger Espinoza MD [facility] 6 [facility]THS    In 7 months Kriss Rousseau APRN [facility] 1 year follow up.                       Future Appointments         Provider Department Appt Notes    In 3 weeks Weiler, Colleen M, DO [facility] Annual physical  last physical 1/2/24 -[place]    In 5 months Roger Espinoza MD  [facility] 6 [facility]    In 7 months Kriss Rousseau APRN [facility] 1 year follow up.          Recent Outpatient Visits              2 weeks ago Conductive hearing loss of left ear with unrestricted hearing of right ear    [facility] Roger Espinoza MD    Office Visit    2 weeks ago BPH with obstruction/lower urinary tract symptoms    [facility] Kriss Rousseau APRN    Office Visit    1 month ago S/P cervical disc replacement    [facility] Skyler Shankar MD    Office Visit    1 month ago Conductive hearing loss of left ear with unrestricted hearing of right ear    [facility]    Office Visit    3 months ago BPH with obstruction/lower urinary tract symptoms    [facility] Kriss Rousseau APRN    Office Visit

## 2025-01-21 ENCOUNTER — OFFICE VISIT (OUTPATIENT)
Dept: FAMILY MEDICINE CLINIC | Facility: CLINIC | Age: 45
End: 2025-01-21
Payer: COMMERCIAL

## 2025-01-21 ENCOUNTER — LAB ENCOUNTER (OUTPATIENT)
Dept: LAB | Age: 45
End: 2025-01-21
Attending: FAMILY MEDICINE
Payer: COMMERCIAL

## 2025-01-21 VITALS
OXYGEN SATURATION: 98 % | DIASTOLIC BLOOD PRESSURE: 80 MMHG | WEIGHT: 230 LBS | HEIGHT: 71 IN | HEART RATE: 101 BPM | RESPIRATION RATE: 18 BRPM | BODY MASS INDEX: 32.2 KG/M2 | TEMPERATURE: 98 F | SYSTOLIC BLOOD PRESSURE: 131 MMHG

## 2025-01-21 DIAGNOSIS — Z00.00 ROUTINE PHYSICAL EXAMINATION: Primary | ICD-10-CM

## 2025-01-21 DIAGNOSIS — Z00.00 ROUTINE PHYSICAL EXAMINATION: ICD-10-CM

## 2025-01-21 DIAGNOSIS — D22.9 NUMEROUS SKIN MOLES: ICD-10-CM

## 2025-01-21 DIAGNOSIS — R25.1 TREMOR OF BOTH HANDS: ICD-10-CM

## 2025-01-21 DIAGNOSIS — F41.9 ANXIETY: ICD-10-CM

## 2025-01-21 DIAGNOSIS — J45.20 MILD INTERMITTENT ASTHMA WITHOUT COMPLICATION (HCC): ICD-10-CM

## 2025-01-21 DIAGNOSIS — F90.9 ATTENTION DEFICIT HYPERACTIVITY DISORDER (ADHD), UNSPECIFIED ADHD TYPE: ICD-10-CM

## 2025-01-21 LAB
ALBUMIN SERPL-MCNC: 4.9 G/DL (ref 3.2–4.8)
ALBUMIN/GLOB SERPL: 1.7 {RATIO} (ref 1–2)
ALP LIVER SERPL-CCNC: 83 U/L
ALT SERPL-CCNC: 52 U/L
ANION GAP SERPL CALC-SCNC: 5 MMOL/L (ref 0–18)
AST SERPL-CCNC: 30 U/L (ref ?–34)
BILIRUB SERPL-MCNC: 0.6 MG/DL (ref 0.3–1.2)
BUN BLD-MCNC: 10 MG/DL (ref 9–23)
BUN/CREAT SERPL: 10.2 (ref 10–20)
CALCIUM BLD-MCNC: 10.6 MG/DL (ref 8.7–10.4)
CHLORIDE SERPL-SCNC: 104 MMOL/L (ref 98–112)
CHOLEST SERPL-MCNC: 224 MG/DL (ref ?–200)
CO2 SERPL-SCNC: 28 MMOL/L (ref 21–32)
CREAT BLD-MCNC: 0.98 MG/DL
DEPRECATED RDW RBC AUTO: 40.9 FL (ref 35.1–46.3)
EGFRCR SERPLBLD CKD-EPI 2021: 96 ML/MIN/1.73M2 (ref 60–?)
ERYTHROCYTE [DISTWIDTH] IN BLOOD BY AUTOMATED COUNT: 12.6 % (ref 11–15)
FASTING PATIENT LIPID ANSWER: YES
FASTING STATUS PATIENT QL REPORTED: YES
GLOBULIN PLAS-MCNC: 2.9 G/DL (ref 2–3.5)
GLUCOSE BLD-MCNC: 99 MG/DL (ref 70–99)
HCT VFR BLD AUTO: 45.9 %
HDLC SERPL-MCNC: 56 MG/DL (ref 40–59)
HGB BLD-MCNC: 16.2 G/DL
LDLC SERPL CALC-MCNC: 140 MG/DL (ref ?–100)
MCH RBC QN AUTO: 31.1 PG (ref 26–34)
MCHC RBC AUTO-ENTMCNC: 35.3 G/DL (ref 31–37)
MCV RBC AUTO: 88.1 FL
NONHDLC SERPL-MCNC: 168 MG/DL (ref ?–130)
OSMOLALITY SERPL CALC.SUM OF ELEC: 283 MOSM/KG (ref 275–295)
PLATELET # BLD AUTO: 412 10(3)UL (ref 150–450)
POTASSIUM SERPL-SCNC: 4.2 MMOL/L (ref 3.5–5.1)
PROT SERPL-MCNC: 7.8 G/DL (ref 5.7–8.2)
RBC # BLD AUTO: 5.21 X10(6)UL
SODIUM SERPL-SCNC: 137 MMOL/L (ref 136–145)
TRIGL SERPL-MCNC: 158 MG/DL (ref 30–149)
TSI SER-ACNC: 0.89 UIU/ML (ref 0.55–4.78)
VLDLC SERPL CALC-MCNC: 29 MG/DL (ref 0–30)
WBC # BLD AUTO: 12.8 X10(3) UL (ref 4–11)

## 2025-01-21 PROCEDURE — 85027 COMPLETE CBC AUTOMATED: CPT

## 2025-01-21 PROCEDURE — 3075F SYST BP GE 130 - 139MM HG: CPT | Performed by: FAMILY MEDICINE

## 2025-01-21 PROCEDURE — 80061 LIPID PANEL: CPT

## 2025-01-21 PROCEDURE — 36415 COLL VENOUS BLD VENIPUNCTURE: CPT

## 2025-01-21 PROCEDURE — 84443 ASSAY THYROID STIM HORMONE: CPT

## 2025-01-21 PROCEDURE — 99396 PREV VISIT EST AGE 40-64: CPT | Performed by: FAMILY MEDICINE

## 2025-01-21 PROCEDURE — 80053 COMPREHEN METABOLIC PANEL: CPT

## 2025-01-21 PROCEDURE — 3008F BODY MASS INDEX DOCD: CPT | Performed by: FAMILY MEDICINE

## 2025-01-21 PROCEDURE — 3079F DIAST BP 80-89 MM HG: CPT | Performed by: FAMILY MEDICINE

## 2025-01-21 RX ORDER — TAMSULOSIN HYDROCHLORIDE 0.4 MG/1
0.8 CAPSULE ORAL DAILY
Qty: 60 CAPSULE | Refills: 2 | Status: SHIPPED | OUTPATIENT
Start: 2025-01-21 | End: 2025-02-20

## 2025-01-21 RX ORDER — MOMETASONE FUROATE 1 MG/G
1 CREAM TOPICAL 2 TIMES DAILY PRN
Qty: 50 G | Refills: 1 | Status: SHIPPED | OUTPATIENT
Start: 2025-01-21

## 2025-01-21 NOTE — PROGRESS NOTES
HPI:  44 yr old male who presents for physical. Struggling with current political environment. Struggling with anxiety. Wakes up earlier and finds it hard to go back to sleep. Tries to walk often.     Feels like allergies are improving.  They were contributing to stomach problems. Now takes meds earlier in the day which is controlling symptoms more.     ADHD is controlled.  Working with Psychiatrist for ADHD and anxiety.  Started with new therapist yesterday.     Has occasional essential tremors which started a few months ago.  Dad had hand tremors.     Seeing Urologist for prostate concerns.  Taking Tamsulosin which improved but is now having symptoms again.     Would like to see Dermatology for skin exam.     Had surgery on cervical spine last year.  Pain is controlled.  Still has a small amount of numbness in left arm and thumb.     Had hearing test a few months ago and has some hearing loss in one year.  Has script for hearing aide.     Vapes marijuana daily.     PMHx: reviewed, see chart  PSHx: reviewed, see chart  All: Crab (diagnostic)   Meds: see chart    ROS:   Allergic/Immuno:  Negative for environmental allergies and food allergies  Cardiovascular:  Negative for chest pain and irregular heartbeat/palpitations  Constitutional:  Negative for decreased activity, fever, irritability and lethargy  Endocrine:  Negative for abnormal sleep patterns, increased activity, polydipsia and polyphagia  ENMT:  Negative for ear drainage, hearing loss and nasal drainage  Eyes:  Negative for eye discharge and vision loss  Gastrointestinal:  Negative for abdominal pain, constipation, decreased appetite, diarrhea and vomiting  Genitourinary:  Negative for dysuria and hematuria  Hema/Lymph:  Negative for easy bleeding and easy bruising  Integumentary:  Negative for pruritus and rash  Musculoskeletal:  Negative for bone/joint symptoms  Neurological:  Negative for gait disturbance  Psychiatric:  Negative for inappropriate  interaction and psychiatric symptoms    PE:  /80   Pulse 101   Temp 97.9 °F (36.6 °C) (Oral)   Resp 18   Ht 5' 11\" (1.803 m)   Wt 230 lb (104.3 kg)   SpO2 98%   BMI 32.08 kg/m²   Gen:  Well-nourished.  No distress.  HEENT: Conjunctive clear.  Alber ear canals clear.  Alber TMs intact with good landmarks noted.  Nares patent.  Oral mucous membrane moist.  Normal lips, teeth, and gums.  Oropharynx normal.  Neck supple.  Good ROM.  No LAD.  Thyroid normal.  CV:  Regular rate and rhythm; no murmurs  Lungs:  Clear to ausculation; good aeration               No wheezes, rales or rhonchi  Abd: soft, non-tender, non-distended          Normal bowel sounds; no masses          No hepatosplenomegaly  Extremities: No cyanosis, clubbing, edema.  Pedal pulses 2+ alber.  MSK:  No abnormalities.  Skin: Warm/dry/intact.  No abnormal moles/lesions noted.  No rashes.    A/P:  Encounter Diagnoses   Name Primary?    Routine physical examination    Encouraged exercise and healthy diet. Labs done.    Yes    Tremor of both hands    New onset.  Dad had tremors as well.  Will refer to Neuro.        Numerous skin moles    Refer to Dermatology for evaluation.        Anxiety    Has been increased based on current political environment.  See therapist.        Attention deficit hyperactivity disorder (ADHD), unspecified ADHD type    Controlled.  Follows with Psychiatry       Mild intermittent asthma without complication (HCC)    Controlled.  CPM      Colleen Weiler, DO

## 2025-02-19 ENCOUNTER — PATIENT MESSAGE (OUTPATIENT)
Dept: FAMILY MEDICINE CLINIC | Facility: CLINIC | Age: 45
End: 2025-02-19

## 2025-02-19 DIAGNOSIS — N40.0 BENIGN PROSTATIC HYPERPLASIA, UNSPECIFIED WHETHER LOWER URINARY TRACT SYMPTOMS PRESENT: Primary | ICD-10-CM

## 2025-02-20 RX ORDER — AMLODIPINE BESYLATE 10 MG/1
10 TABLET ORAL
Qty: 90 TABLET | Refills: 3 | Status: SHIPPED | OUTPATIENT
Start: 2025-02-20

## 2025-03-20 ENCOUNTER — OFFICE VISIT (OUTPATIENT)
Dept: DERMATOLOGY CLINIC | Facility: CLINIC | Age: 45
End: 2025-03-20
Payer: COMMERCIAL

## 2025-03-20 ENCOUNTER — PATIENT MESSAGE (OUTPATIENT)
Dept: FAMILY MEDICINE CLINIC | Facility: CLINIC | Age: 45
End: 2025-03-20

## 2025-03-20 DIAGNOSIS — D18.01 CHERRY ANGIOMA: ICD-10-CM

## 2025-03-20 DIAGNOSIS — L30.9 ECZEMA, UNSPECIFIED TYPE: ICD-10-CM

## 2025-03-20 DIAGNOSIS — D22.9 MULTIPLE BENIGN NEVI: ICD-10-CM

## 2025-03-20 DIAGNOSIS — L57.0 MULTIPLE ACTINIC KERATOSES: ICD-10-CM

## 2025-03-20 DIAGNOSIS — L81.4 LENTIGINES: ICD-10-CM

## 2025-03-20 DIAGNOSIS — L82.1 SEBORRHEIC KERATOSES: Primary | ICD-10-CM

## 2025-03-20 PROCEDURE — 99204 OFFICE O/P NEW MOD 45 MIN: CPT | Performed by: STUDENT IN AN ORGANIZED HEALTH CARE EDUCATION/TRAINING PROGRAM

## 2025-03-20 PROCEDURE — 17000 DESTRUCT PREMALG LESION: CPT | Performed by: STUDENT IN AN ORGANIZED HEALTH CARE EDUCATION/TRAINING PROGRAM

## 2025-03-20 PROCEDURE — 17003 DESTRUCT PREMALG LES 2-14: CPT | Performed by: STUDENT IN AN ORGANIZED HEALTH CARE EDUCATION/TRAINING PROGRAM

## 2025-03-20 RX ORDER — MONTELUKAST SODIUM 10 MG/1
10 TABLET ORAL NIGHTLY
Qty: 90 TABLET | Refills: 3 | Status: SHIPPED | OUTPATIENT
Start: 2025-03-20

## 2025-03-20 RX ORDER — TRIAMCINOLONE ACETONIDE 1 MG/G
CREAM TOPICAL
Qty: 80 G | Refills: 3 | Status: SHIPPED | OUTPATIENT
Start: 2025-03-20

## 2025-03-20 NOTE — PROGRESS NOTES
New patient     Referred by:   No referring provider defined for this encounter.     CHIEF COMPLAINT: FBSE    HISTORY OF PRESENT ILLNESS: .    1. Dry Spot   Location: Top of scalp/mid-back  Duration: chronic  Bleeding, growing, changing?: No; Dry  Scaly?:No    Itchy?:No    Current treatment: None      DERM HISTORY:  History of skin cancer: No  History of melanoma: No    FAMILY HISTORY:  History of melanoma: No    PAST MEDICAL HISTORY:  Past Medical History:    ADHD    Allergic rhinitis    Asthma (HCC)    Cats    Dyslipidemia    Eczema    Essential hypertension    High blood pressure    Hypertension    Visual impairment    glasses/contacts       REVIEW OF SYSTEMS:  Constitutional: Denies fever, chills, unintentional weight loss.   Skin as per HPI    Medications  Current Outpatient Medications   Medication Sig Dispense Refill    montelukast 10 MG Oral Tab Take 1 tablet (10 mg total) by mouth nightly. 90 tablet 3    amLODIPine 10 MG Oral Tab Take 1 tablet (10 mg total) by mouth once daily. 90 tablet 3    Mometasone Furoate (ELOCON) 0.1 % External Cream Apply 1 Application  topically 2 (two) times daily as needed. 50 g 1    cetirizine 10 MG Oral Tab Take 1 tablet (10 mg total) by mouth daily. 90 tablet 3    Atomoxetine HCl 100 MG Oral Cap Take 1 capsule (100 mg total) by mouth daily.      busPIRone HCl 30 MG Oral Tab Take 1 tablet (30 mg total) by mouth 2 (two) times daily.      lisinopril-hydroCHLOROthiazide 10-12.5 MG Oral Tab Take 1 tablet by mouth daily. 90 tablet 3    Cholecalciferol (VITAMIN D3) 25 MCG (1000 UT) Oral Cap Take 1 tablet by mouth daily.      Multiple Vitamins-Minerals (MENS MULTI VITAMIN & MINERAL) Oral Tab Take by mouth.         PHYSICAL EXAM:  Patient declined chaperone   General: awake, alert, no acute distress  Skin: Skin exam was performed today including the following: head and face, scalp, neck, chest (including breasts and axillae), abdomen, back, bilateral upper extremities, bilateral lower  extremities, hands, feet, digits, nails. Pertinent findings include:   - Scattered bright red-purple dome-shaped papules on the trunk and extremities   - Scattered light brown stellate macules on sun exposed sites  - Scattered, evenly colored, round brown macules and papules with regular borders on the trunk and extremities  - Numerous scattered skin-colored and brown, waxy, stuck-on papules and plaques on the trunk and extremities  - L dorsal hand with pink scaly plaque  - L upper back with a sclerotic papule  - frontal scalp with a pink gritty papule  - L temple with a pink gritty papule  - L forehead with a pink gritty papule    ASSESSMENT & PLAN:  Pathophysiology of diagnoses discussed with patient.  Therapeutic options reviewed. Risks, benefits, and alternatives discussed with patient. Instructions reviewed at length.    #Lentigines  #Seborrheic keratoses   #Cherry angiomas   - Reassurance provided regarding the benign nature of these lesions.    #Multiple benign nevi  - Complete skin exam performed today with no outlier lesions identified   - Reassured patient of benign nature of these lesions.   - Recommend daily photoprotection with broad-spectrum sunscreen, avoidance of sun during peak hours, and sun protective clothing.    - Dermoscopy was used for physical examination of pigmented lesions during today's office visit.    #Eczematous Dermatitis, L dorsal hand   - Triamcinolone 0.1% twice daily to affected areas Monday-Friday with flares of eczema. Take weekends off. Avoid use on face, breasts, groin, or axiillae.     #Scar  - Reassured regarding benign nature. No treatment necessary unless symptomatic/changing.    #Multiple actinic keratoses  - Discussed premalignant etiology and possibility of transformation to SCC  - Recommended cryotherapy today   - Discussed side effects including redness, swelling, crusting, and discolortion after treatment, wound care with soap/water and vaseline   - Recommend sun  protection with spf 30 or higher, sun protective clothing such as wide brimmed hats and long sleeves. Recommend avoiding midday sun (10 am- 3 pm).     - Procedure Note Cryosurgery of pre-malignant lesion(s)  Risks, benefits, alternatives, complications, and personnel required for cryosurgery reviewed with patient. Patient verbalizes understanding and wishes to proceed.   - Cryosurgery performed with Liquid Nitrogen via cryostat spray gun to Actinic Keratosis . 3 lesion(s) treated.   - Patient tolerated well and wound care discussed. Return if lesions fail to fully resolve.        Return to clinic: 1 year  or sooner if something concerning arises     Tony Barrios MD    By signing my name below, Leon CARRERA MA,  attest that this documentation has been prepared under the direction and in the presence of Tony Barrios MD.   Electronically Signed: Leon HASSAN MA, 3/20/2025, 3:36 PM.    ITony MD,  personally performed the services described in this documentation. All medical record entries made by the scribe were at my direction and in my presence.  I have reviewed the chart and agree that the record reflects my personal performance and is accurate and complete.  Tony Barrios MD, 3/20/2025, 3:39 PM

## 2025-03-20 NOTE — TELEPHONE ENCOUNTER
Refill passed per Clinic protocol.  Requested Prescriptions   Pending Prescriptions Disp Refills    MONTELUKAST 10 MG Oral Tab [Pharmacy Med Name: Montelukast Sodium 10 Mg Tab Auro] 90 tablet 0     Sig: TAKE ONE TABLET BY MOUTH ONE TIME DAILY AT NIGHT       Asthma & COPD Medication Protocol Passed - 3/20/2025  9:19 AM        Passed - ACT Score greater than or equal to 20     25 (1/21/2025 12:18 PM)            Passed - Appointment in past 6 or next 3 months      Recent Outpatient Visits              1 month ago Routine physical examination    [facility] Weiler, Colleen M, DO    Office Visit    3 months ago Conductive hearing loss of left ear with unrestricted hearing of right ear    [facility] Roger Espinoza MD    Office Visit    3 months ago BPH with obstruction/lower urinary tract symptoms    [facility] Kriss Rousseau APRN    Office Visit    4 months ago S/P cervical disc replacement    [facility] Skyler Shankar MD    Office Visit    4 months ago Conductive hearing loss of left ear with unrestricted hearing of right ear    [facility] Chyna Soliz Au.D    Office Visit          Future Appointments         Provider Department Appt Notes    Today Tony Barrios MD [facility] Dx: Numerous skin moles (D22.9)   Signed Referral [place]:    Number of Visits: 1    In 1 month Yaneli Naranjo DO [facility] rfd by dr de la paz / Tremor of both hands / bcbs o  ref id # [id number]    In 2 months Roger Espinoza MD [facility] 6 MTHS    In 4 months Kriss Rousseau APRN [facility] 1 year follow up.                    Passed - ACT recorded in  the last 12 months     25 (1/21/2025 12:18 PM)            Passed - Medication is active on med list

## 2025-04-24 ENCOUNTER — OFFICE VISIT (OUTPATIENT)
Dept: NEUROLOGY | Facility: CLINIC | Age: 45
End: 2025-04-24
Payer: COMMERCIAL

## 2025-04-24 DIAGNOSIS — G25.0 ESSENTIAL TREMOR: Primary | ICD-10-CM

## 2025-04-24 DIAGNOSIS — R25.1 EXCESSIVE PHYSIOLOGIC TREMOR: ICD-10-CM

## 2025-04-24 PROBLEM — J02.0 PHARYNGITIS DUE TO STREPTOCOCCUS SPECIES: Status: RESOLVED | Noted: 2017-01-20 | Resolved: 2025-04-24

## 2025-04-24 RX ORDER — TAMSULOSIN HYDROCHLORIDE 0.4 MG/1
0.4 CAPSULE ORAL
COMMUNITY
Start: 2025-04-03

## 2025-04-24 NOTE — PROGRESS NOTES
[facility] [address], [address]  [facility] [address]  [phone]              Date: April 24, 2025  Patient Name: Hussein Davila   MRN: [id number]    Reason for Evaluation: Tremors    HPI:     Hussein Davila is a 44 year old man with past medical history of anxiety, ADHD, C5-6 anterior cervical disc replacement with residual thenar distribution tingling, hypertension, hyperlipidemia, asthma, left-sided conductive hearing loss who presents for evaluation of upper extremity tremors.  Father also had tremors.  Symptoms are intermittent and involve his upper extremities only.  No head tremor, truncal tremor or lower extremity tremor.  He does have numbness and tingling along his left thenar eminence along his forearm, present since his cervical spine surgery.  No weakness.  Tremors occur with posture, such as holding up his arms or holding his phone.  He admits to some social embarrassment.  He does not notice worsening of his right or left upper extremity.  His father also had essential tremor but his was more severe and he did take medication for it.  The patient's tremors are not worsened by caffeine or anxiety.  No association with alcohol use but he has not specifically checked.    OUTPATIENT MEDICATIONS  Medications Ordered Prior to Encounter[1]    MEDICAL HISTORY  Past Medical History[2]    SURGICAL HISTORY  Past Surgical History[3]    SOCIAL HISTORY  Social Hx on file[4]    FAMILY HISTORY  Family History[5]    ALLERGIES  Allergies[6]    REVIEW OF SYSTEMS:   13-point review of systems was done and is negative unless otherwise stated in HPI.     PHYSICAL EXAM:   There were no vitals taken for this visit.  General appearance: Well appearing, alert and in no acute distress  Skin: skin color normal.  No rashes or lesions.    Head: Normocephalic, atraumatic.    Neurological exam:    Mental Status:   Attention/Concentration: intact attention on  bedside test   Fund of knowledge: intact  Speech: no dysarthria or aphasia     Cranial Nerves:  Pupils: OD 4 mm to 3 mm;  OS 4 mm to 3 mm   Visual Fields: R and L visual fields full to confrontation  CN III, CN IV, CN VI (Extraocular movements): intact.    CN V:   intact sensation to light touch in all three divisions of the trigeminal nerves bilaterally.  CN VII: normal facial muscle strength bilaterally  CN VIII: auditory acuity intact to bedside testing    Motor Exam:   Muscle Bulk: No atrophy or fasciculations   Involuntary movements: minimal postural tremors of upper extremities     Strength:    5/5 throughout       Sensory:   Light touch: intact in all 4 extremities except along C5-6 in left upper extremity     Reflexes:   2+ throughout     Coordination:   Normal: Finger to nose: no ataxia or dysmetria     Gait:   Arises independently; normal posture; gait stable with normal stride length, rate, base and arm swing.     LABS/DATA:  CBC normal, CMP with very mildly elevated ALT, TSH is normal  LDL is 140    IMAGING:  No MRI brain done  In April 2024 he had an MRI of his cervical spine with multilevel degenerative disc disease and disc herniations    ASSESSMENT:  The patient is a 44 year old man with past medical history of anxiety, ADHD, C5-6 anterior cervical disc replacement with residual thenar distribution tingling, hypertension, hyperlipidemia, asthma, left-sided conductive hearing loss who presents for evaluation of bilateral upper extremity postural tremors, sometimes leading to social embarrassment, with family history of essential tremor in father who took medication for more severe symptoms.  On exam, the patient does have minimal postural tremors of his upper extremities.    Essential tremor with probable some component of excessive physiological tremor  -We discussed medication options.  Primidone would be the first-line.  He has a history of childhood asthma, provoked by cat exposure, so would  probably avoid propranolol.  He has tried gabapentin in the past and would not want to try this again.  Would avoid topiramate or zonisamide as first-line options.  Overall, the patient does not feel his symptoms are bothersome enough to warrant treatment.  I agree with this.  He will monitor and let me know if anything worsens.  Do not think MRI brain is necessary.  I also do not think he has Shar's disease.    Follow-up as needed    Discussed indication, administration, dose, and side effects with patient of any medications personally prescribed. Patient was advised to let my office know if they have any questions or concerns.       Today, I personally spent 20 minutes in this case, including chart review, time spent with patient doing face to face evaluation w/ interview and exam and patient education, counseling, and time was spent in patient education, counseling, and coordination of care as described above.   Issues discussed: Diagnosis and implications on future health, benefits and side effects of present and future medications, test results as well as further testing and medications required.    This note was prepared using Dragon Medical voice recognition dictation software and as a result, errors may occur. When identified, these errors have been corrected. While every attempt is made to correct errors during dictation, discrepancies may still exist    MO Naranjo DO   Staff Neurologist   4/24/2025  1:31 PM                     [1]   Current Outpatient Medications on File Prior to Visit   Medication Sig Dispense Refill    tamsulosin 0.4 MG Oral Cap Take 1 capsule (0.4 mg total) by mouth After Breakfast. TAKE 30 MINUTES AFTER MEAL      montelukast 10 MG Oral Tab Take 1 tablet (10 mg total) by mouth nightly. 90 tablet 3    triamcinolone 0.1 % External Cream Apply twice daily  Monday-Friday. Take weekends off. Use on affected areas. 80 g 3    amLODIPine 10 MG Oral Tab Take 1 tablet (10 mg total) by mouth once  daily. 90 tablet 3    Mometasone Furoate (ELOCON) 0.1 % External Cream Apply 1 Application  topically 2 (two) times daily as needed. 50 g 1    cetirizine 10 MG Oral Tab Take 1 tablet (10 mg total) by mouth daily. 90 tablet 3    Atomoxetine HCl 100 MG Oral Cap Take 1 capsule (100 mg total) by mouth daily.      busPIRone HCl 30 MG Oral Tab Take 1 tablet (30 mg total) by mouth 2 (two) times daily.      lisinopril-hydroCHLOROthiazide 10-12.5 MG Oral Tab Take 1 tablet by mouth daily. 90 tablet 3    Cholecalciferol (VITAMIN D3) 25 MCG (1000 UT) Oral Cap Take 1 tablet by mouth daily.      Multiple Vitamins-Minerals (MENS MULTI VITAMIN & MINERAL) Oral Tab Take by mouth.       No current facility-administered medications on file prior to visit.   [2]   Past Medical History:   ADHD    Allergic rhinitis    Asthma (HCC)    Cats    Dyslipidemia    Eczema    Essential hypertension    High blood pressure    Hypertension    Visual impairment    glasses/contacts   [3]   Past Surgical History:  Procedure Laterality Date    Colonoscopy N/A 2024    Procedure: COLONOSCOPY;  Surgeon: Snehal Magdaleno MD;  Location: [facility] MAIN OR    Other  Childhood    Bilateral myringotomy tubes    Vasectomy     [4]   Social History  Socioeconomic History    Marital status:    Occupational History    Occupation: Manager   Tobacco Use    Smoking status: Former     Current packs/day: 0.00     Average packs/day: 1 pack/day for 6.0 years (6.0 ttl pk-yrs)     Types: Cigarettes     Start date: 3/20/1999     Quit date: 3/20/2005     Years since quittin.1     Passive exposure: Never    Smokeless tobacco: Current   Vaping Use    Vaping status: Some Days    Substances: CBD   Substance and Sexual Activity    Alcohol use: Not Currently     Comment: Occasional beer    Drug use: Yes     Types: Cannabis     Comment: vape   Other Topics Concern    Caffeine Concern Yes     Comment: coffee    Reaction to local anesthetic No    Pt has a pacemaker No    Pt has  a defibrillator No   [5]   Family History  Problem Relation Age of Onset    Asthma Mother     Other (Other) Mother         asthma    Diabetes Father     Hypertension Father     Other (CVA) Father         Age 70s    Diabetes Brother     Colon Cancer Paternal Grandmother     Heart Disease Maternal Grandfather          age 60s   [6]   Allergies  Allergen Reactions    Crab (Diagnostic) HIVES

## 2025-05-12 RX ORDER — LISINOPRIL AND HYDROCHLOROTHIAZIDE 10; 12.5 MG/1; MG/1
1 TABLET ORAL DAILY
Qty: 90 TABLET | Refills: 3 | Status: SHIPPED | OUTPATIENT
Start: 2025-05-12

## 2025-05-12 NOTE — TELEPHONE ENCOUNTER
Refill Per Protocol     Requested Prescriptions   Pending Prescriptions Disp Refills    LISINOPRIL-HYDROCHLOROTHIAZIDE 10-12.5 MG Oral Tab [Pharmacy Med Name: Lisinopril/Hydrochlorothiazide 10-12.5 Mg Tab Solc] 90 tablet 0     Sig: TAKE ONE TABLET BY MOUTH ONE TIME DAILY       Hypertension Medications Protocol Passed - 5/12/2025  2:30 PM        Passed - CMP or BMP in past 12 months        Passed - Last BP reading less than 140/90     BP Readings from Last 1 Encounters:   01/21/25 131/80               Passed - In person appointment or virtual visit in the past 12 mos or appointment in next 3 mos     Recent Outpatient Visits              2 weeks ago Essential tremor    [facility], FlorienRamesh Acuna Sana Khan,     Office Visit    1 month ago Seborrheic keratoses    [facility] Tony Barrios MD    Office Visit    3 months ago Routine physical examination    [facility] Weiler, Colleen M, DO    Office Visit    5 months ago Conductive hearing loss of left ear with unrestricted hearing of right ear    [facility] Roger Espinoza MD    Office Visit    5 months ago BPH with obstruction/lower urinary tract symptoms    [facility] Kriss Rousseau APRN    Office Visit          Future Appointments         Provider Department Appt Notes    In 3 weeks Roger Espinoza MD [facility] 6 MTHS    In 2 months Kriss Rousseau APRN [facility] 1 year follow up.    In 10 months Tony Barrios MD [facility] full body                    Passed - EGFRCR or GFRNAA > 50     GFR Evaluation  EGFRCR: 96 , resulted on 1/21/2025          Passed - Medication is active on med list

## 2025-08-08 ENCOUNTER — OFFICE VISIT (OUTPATIENT)
Dept: SURGERY | Facility: CLINIC | Age: 45
End: 2025-08-08

## 2025-08-08 DIAGNOSIS — N13.8 BPH WITH OBSTRUCTION/LOWER URINARY TRACT SYMPTOMS: Primary | ICD-10-CM

## 2025-08-08 DIAGNOSIS — Z12.5 SCREENING PSA (PROSTATE SPECIFIC ANTIGEN): ICD-10-CM

## 2025-08-08 DIAGNOSIS — N40.1 BPH WITH OBSTRUCTION/LOWER URINARY TRACT SYMPTOMS: Primary | ICD-10-CM

## 2025-08-08 PROCEDURE — 99213 OFFICE O/P EST LOW 20 MIN: CPT | Performed by: PHYSICIAN ASSISTANT

## 2025-08-08 RX ORDER — SILODOSIN 8 MG/1
8 CAPSULE ORAL DAILY
Qty: 90 CAPSULE | Refills: 3 | Status: SHIPPED | OUTPATIENT
Start: 2025-08-08 | End: 2026-08-03

## 2025-08-11 ENCOUNTER — PATIENT MESSAGE (OUTPATIENT)
Dept: FAMILY MEDICINE CLINIC | Facility: CLINIC | Age: 45
End: 2025-08-11

## 2025-08-11 DIAGNOSIS — N40.0 BENIGN PROSTATIC HYPERPLASIA, UNSPECIFIED WHETHER LOWER URINARY TRACT SYMPTOMS PRESENT: Primary | ICD-10-CM

## 2025-08-19 ENCOUNTER — PATIENT MESSAGE (OUTPATIENT)
Dept: SURGERY | Facility: CLINIC | Age: 45
End: 2025-08-19

## (undated) NOTE — LETTER
Weiler, Colleen M, Do  [address]  [address]  [place] [address]       12/03/24        Patient: Hussein Davila   YOB: 1980   Date of Visit: 12/3/2024       Dear  Dr. Weiler, DO,      Thank you for referring Hussein Davila to my practice.  Please find my assessment and plan below.    ASSESSMENT AND PLAN    1. Conductive hearing loss of left ear with unrestricted hearing of right ear  Mild downsloping to moderate upsloping to mild conductive hearing loss on the left.  Otosclerosis?  Ossicular scarring?  He does have some tympanosclerosis bilaterally but this is fairly mild.  On the left interestingly he does have a small little growth within the tympanic membrane itself anteriorly and superiorly.  He is not sure if he has had some type of fat patch myringoplasty as a child but unclear if there would still be fat present 40 years later.  Does not appear to be cholesteatoma.  I did recommend repeating a hearing test in 1 year but return to see me in 6 months for reevaluation of his tympanic membrane on the left.  He agrees with this plan.  In addition we did discuss amplification I did give him a medical clearance letter as well as a copy of his hearing test and he will contact his insurance to see what type of hearing aid benefits are available to him.                 Sincerely,   Roger Espinoza MD   [facility] MEDICAL [facility], [address] [address]    Document electronically generated by:  Roger Espinoza MD

## (undated) NOTE — ED AVS SNAPSHOT
Jose Little   MRN: [id number]    Department:  [facility] Emergency Department   Date of Visit:  3/4/2020           Disclosure     Insurance plans vary and the physician(s) referred by the ER may not be covered by your plan.  Please contact yo CARE PHYSICIAN AT ONCE OR RETURN IMMEDIATELY TO THE EMERGENCY DEPARTMENT. If you have been prescribed any medication(s), please fill your prescription right away and begin taking the medication(s) as directed.   If you believe that any of the medications

## (undated) NOTE — LETTER
ASTHMA ACTION PLAN for Lawrence Laird     : 1980     Date: 20  Doctor:  Mao Gonsales DO  Phone for doctor or clinic: [facility]  [phone]      ACT Score: If your symptoms do not improve in ONE hour -  go to the emergency room or call 911 immediately! If symptoms improve, call office for appointment immediately.     Albuterol inhaler 2 puffs every 20 minutes for three treatments       Don't forget:  · Rinse

## (undated) NOTE — LETTER
4/1/2024              Hussein Moses Migel Davila        [address] [address]         Dear Hussein,    Our records indicate that the tests ordered for you by Snehal Magdaleno MD  have not been done.  If you have, in fact, already completed the tests or you do not wish to have the tests done, please contact our office at THE NUMBER LISTED BELOW.  Otherwise, please proceed with the testing.    Imaging order   Orders Placed on 2/7/2024  XR UGI/ESOPHAGUS DOUBLE CONTRAST (CPT=74246) -   To schedule a test at any [facility], call Central Scheduling at [phone], Monday through Friday between 7:30am to 6pm and on Saturday between 8am and 1pm.   Evening and weekend appointments for your exam are available.       Sincerely,    Snehal Magdaleno MD  [facility]  [address]  [facility] [id number] [phone]

## (undated) NOTE — MR AVS SNAPSHOT
[facility]  [address], [address]  [phone]               Thank you for choosing us for your health care visit with Kalia Kang MD.  We are glad to serve you and happy to provide you with this summary view more details from this visit by going to [email or web address]. If you've recently had a stay at the Hospital you can access your discharge instructions in [employer] by going to Visits < Admission Summaries.  If you've been to the Emergency Depar

## (undated) NOTE — ED AVS SNAPSHOT
Brooke Andrea   MRN: [id number]    Department:  [facility] Emergency Department   Date of Visit:  3/21/2020           Disclosure     Insurance plans vary and the physician(s) referred by the ER may not be covered by your plan.  Please contact CARE PHYSICIAN AT ONCE OR RETURN IMMEDIATELY TO THE EMERGENCY DEPARTMENT. If you have been prescribed any medication(s), please fill your prescription right away and begin taking the medication(s) as directed.   If you believe that any of the medications

## (undated) NOTE — MR AVS SNAPSHOT
[facility] SPECIALTY [facility] [place] [address]  [address] [phone]               Thank you for choosing us for your health care visit with Zohreh Ann MD.  We are glad to serve you and happy to provide you with this summary o - penicillin v potassium 500 MG Tabs            Today's Orders     POC Rapid Strep [85322]    Complete by:  As directed    Assoc Dx:  Pharyngitis due to Streptococcus species [J02.0]                 Results of Recent Testing     STREP A ASSAY W/OPTIC

## (undated) NOTE — LETTER
ASTHMA ACTION PLAN for Hussein Davila     : 1980     Date: 25  Doctor:  Colleen Weiler, DO  Phone for doctor or clinic: [facility], [address] [address] [phone]      ACT Score: 25    ACT Goal: 20 or greater    Call your provider if you require your rescue/quick reliever medication more than 2-3 times in a 24 hour period.    If you require your rescue inhaler/medication more than 2-3 times weekly, your asthma may not be under proper control and you should seek medical attention.    *Quick Relievers are Xopenex and Albuterol*    You can use the colors of a traffic light to help learn about your asthma medicines.  Therapy Range       1. Green - Go! % of Personal Best Peak Flow   Use controller medicine.   Breathing is good  No cough or wheeze  Can work and play Medicine How much to take When to take it    Medications       Leukotriene Modulators Instructions     montelukast 10 MG Oral Tab Take 1 tablet (10 mg total) by mouth nightly.                    2. Yellow - Caution. 50-79% Personal Best Peak Flow  Use reliever medicine to keep an asthma attack from getting bad.   Cough  Quick Relievers  Wheezing  Tight Chest  Wake up at night Medicine How much to take When to take it    If symptoms are not improving in 24-48 hrs, call office for further instructions  Medications       Leukotriene Modulators Instructions     montelukast 10 MG Oral Tab Take 1 tablet (10 mg total) by mouth nightly.                    3. Red - Stop! Danger! <50% Personal Best Peak Flow  Continue Controller Medications But ADD:   Medicine not helping  Breathing is hard and fast  Nose opens wide  Can't walk  Ribs show  Can't talk well Medicine How much to take When to take it    If your symptoms do not improve in ONE hour -  go to the emergency room or call 911 immediately! If symptoms improve, call office for appointment  immediately.    Albuterol inhaler 2 puffs every 20 minutes for three treatments       Don't forget:  Rinse mouth after using inhaler  Use spacer for inhaler  Remember to get your Flu vaccine every fall!    [x] Asthma Action Plan reviewed with the caregiver and patient, and a copy of the plan was given to the patient/caregiver.   [] Asthma Action Plan reviewed with the caregiver and patient on the phone, and copy mailed to patient/caregiver or sent via [employer].     Signatures:   Provider  Colleen Weiler,  Patient  Hussein Davila Caretaker

## (undated) NOTE — LETTER
ASTHMA ACTION PLAN for Leatha Burleson     : 1980     Date: 18  Doctor:  Mariana Gama DO  Phone for doctor or clinic: [facility] , [address], [address] [place] [address] [place] , [address] [place] [address]  [phone]      ACT Score: or call 911 immediately! If symptoms improve, call office for appointment immediately.     Albuterol inhaler 2 puffs every 20 minutes for three treatments       Don't forget:  · Rinse mouth after using inhaler  · Use spacer for inhaler  · Remember to get yo

## (undated) NOTE — LETTER
24    RE: Hussein Davila     : 1980    Dear Dr. Weiler,    This letter is to inform you that your patient has been scheduled for surgery with Dr. Skyler Shankar on 24 at [facility]. Pre-operative clearance has been requested.  We have asked the patient to contact your office to schedule a pre-operative visit.     Diagnosis: Cervical radiculopathy at C6, Cervical disc herniation  Procedure: Cervical 5-6 Artificial Disc Replacement      A Pre-operative History & Physical is needed for medical clearance within 30 days of surgery. Please address patient's active problems and potential risks of having surgery considering their medical history.  Pre-op labs are scheduled through the [facility] department. If any labs/testing are being done through the PCP office, then results should be faxed to the pre-admission testing department at [facility] at [phone]. Our pre-operative lab orders are located in our surgery order, if the patient would like these done through your office, you will need to place separate orders.     Please fax clearance letter/office visit note to our office at fax #: [phone]. Your office note must clearly indicate if the patient is medically cleared for surgery or not.    The following orders will be placed by pre-admission testing:  CBC  CMP  Type and Screen   PT/PTT/INR  MRSA/MSSA Nasal Swab  EKG  (*And any other pertinent testing based on patient's current clinical condition.)    If you have any questions, you may contact our office at [phone], option # 2.    Thank you,

## (undated) NOTE — LETTER
8/5/2019              [address]        [facility] [address]         Dear Mckenzie Loges,    Our records indicate that the tests ordered for you by Kalia Kang, DO  have not been done.   If you have, in fact, already completed the

## (undated) NOTE — LETTER
[facility], [address]   [address] [place] [address]       12/01/20        Patient: Kenya Braswell   YOB: 1980   Date of Visit: 12/1/2020       Dear  Dr. Elier Soler,      Thank you for referring Kenya Braswell to

## (undated) NOTE — LETTER
ASTHMA ACTION PLAN for Hussein Davila     : 1980     Date: 24  Doctor:  Colleen Weiler, DO  Phone for doctor or clinic: [facility], [address] [address] [phone]      ACT Score: 25    ACT Goal: 20 or greater    Call your provider if you require your rescue/quick reliever medication more than 2-3 times in a 24 hour period.    If you require your rescue inhaler/medication more than 2-3 times weekly, your asthma may not be under proper control and you should seek medical attention.    *Quick Relievers are Xopenex and Albuterol*    You can use the colors of a traffic light to help learn about your asthma medicines.  Year Round       1. Green - Go! % of Personal Best Peak Flow   Use controller medicine.   Breathing is good  No cough or wheeze  Can work and play Medicine How much to take When to take it    Medications       Leukotriene Modulators Instructions     montelukast 10 MG Oral Tab Take 1 tablet (10 mg total) by mouth nightly.                    2. Yellow - Caution. 50-79% Personal Best Peak Flow  Use reliever medicine to keep an asthma attack from getting bad.   Cough  Quick Relievers  Wheezing  Tight Chest  Wake up at night Medicine How much to take When to take it    If symptoms are not improving in 24-48 hrs, call office for further instructions  Medications       Leukotriene Modulators Instructions     montelukast 10 MG Oral Tab Take 1 tablet (10 mg total) by mouth nightly.                    3. Red - Stop! Danger! <50% Personal Best Peak Flow  Continue Controller Medications But ADD:   Medicine not helping  Breathing is hard and fast  Nose opens wide  Can't walk  Ribs show  Can't talk well Medicine How much to take When to take it    If your symptoms do not improve in ONE hour -  go to the emergency room or call 911 immediately! If symptoms improve, call office for appointment immediately.    Albuterol  inhaler 2 puffs every 20 minutes for three treatments       Don't forget:  Rinse mouth after using inhaler  Use spacer for inhaler  Remember to get your Flu vaccine every fall!    [x] Asthma Action Plan reviewed with the caregiver and patient, and a copy of the plan was given to the patient/caregiver.   [] Asthma Action Plan reviewed with the caregiver and patient on the phone, and copy mailed to patient/caregiver or sent via [employer].     Signatures:   Provider  Colleen Weiler, DO Patient  Hussein Davila Caretaker